# Patient Record
Sex: MALE
[De-identification: names, ages, dates, MRNs, and addresses within clinical notes are randomized per-mention and may not be internally consistent; named-entity substitution may affect disease eponyms.]

---

## 2024-02-26 PROBLEM — Z00.00 ENCOUNTER FOR PREVENTIVE HEALTH EXAMINATION: Status: ACTIVE | Noted: 2024-02-26

## 2024-03-19 ENCOUNTER — APPOINTMENT (OUTPATIENT)
Dept: ORTHOPEDIC SURGERY | Facility: CLINIC | Age: 69
End: 2024-03-19

## 2024-03-26 ENCOUNTER — APPOINTMENT (OUTPATIENT)
Dept: ORTHOPEDIC SURGERY | Facility: CLINIC | Age: 69
End: 2024-03-26

## 2024-03-29 ENCOUNTER — APPOINTMENT (OUTPATIENT)
Dept: ORTHOPEDIC SURGERY | Facility: CLINIC | Age: 69
End: 2024-03-29

## 2024-07-30 ENCOUNTER — APPOINTMENT (OUTPATIENT)
Dept: ORTHOPEDIC SURGERY | Facility: CLINIC | Age: 69
End: 2024-07-30

## 2024-08-27 ENCOUNTER — APPOINTMENT (OUTPATIENT)
Dept: ORTHOPEDIC SURGERY | Facility: CLINIC | Age: 69
End: 2024-08-27
Payer: MEDICARE

## 2024-08-27 VITALS — HEIGHT: 69 IN | WEIGHT: 180 LBS | BODY MASS INDEX: 26.66 KG/M2

## 2024-08-27 DIAGNOSIS — E78.00 PURE HYPERCHOLESTEROLEMIA, UNSPECIFIED: ICD-10-CM

## 2024-08-27 DIAGNOSIS — Z96.652 PRESENCE OF LEFT ARTIFICIAL KNEE JOINT: ICD-10-CM

## 2024-08-27 DIAGNOSIS — Z00.00 ENCOUNTER FOR GENERAL ADULT MEDICAL EXAMINATION W/OUT ABNORMAL FINDINGS: ICD-10-CM

## 2024-08-27 DIAGNOSIS — I10 ESSENTIAL (PRIMARY) HYPERTENSION: ICD-10-CM

## 2024-08-27 PROCEDURE — 99203 OFFICE O/P NEW LOW 30 MIN: CPT

## 2024-08-27 PROCEDURE — 73564 X-RAY EXAM KNEE 4 OR MORE: CPT | Mod: LT

## 2024-08-27 NOTE — IMAGING
[de-identified] : LEFT KNEE EXAM Well healed surgical scar Alignment: Neutral  Effusion: None Atrophy: None                                                  Stable to Varus/valgus stress Posterior Drawer Test: negative Anterior Drawer Test: Negative Knee Extension/Flexion:10- 120  Medial/lateral compartments Medial joint line: No Tenderness Lateral joint line: No Tenderness Ashu test: negative  Patellofemoral joint Medial patellar facet: no tenderness Patellar grind: Negative  Tendons: Pes Anserine: No tenderness Gerdys Tubercle/ IT Band: No tenderness Quadriceps Tendon: No Tenderness patellar tendon: no Tenderness Tibial tubercle: not tenderness Calf: no Tenderness  Neurovascular exam Muscle strength: 5/5 Sensation to light touch: intact Distal pulses: 2+  [id number] Xrays of the Left Knee were taken demonstrating non resurfaced patella, implant in place without signs of loosening

## 2024-08-27 NOTE — ASSESSMENT
[FreeTextEntry1] : 70 y/o M s/p L TKA with flexion contracture and unresurfaced patella  Physical Therapy ESR/CRP/-[phone]-Sister Jazz call with results

## 2024-08-27 NOTE — HISTORY OF PRESENT ILLNESS
[] : yes [de-identified] : Patient states he had knee surgery about 5 years ago, pain has been constant since surgery and has worsened over time. Patient states he has issues walking. Had TKA at [facility], states he never improved. Had therapy completed. Has trouble putting weight on the leg and walking.

## 2024-10-04 ENCOUNTER — INPATIENT (INPATIENT)
Facility: HOSPITAL | Age: 69
LOS: 19 days | Discharge: HOME HEALTH | DRG: 176 | End: 2024-10-24
Attending: STUDENT IN AN ORGANIZED HEALTH CARE EDUCATION/TRAINING PROGRAM | Admitting: STUDENT IN AN ORGANIZED HEALTH CARE EDUCATION/TRAINING PROGRAM
Payer: MEDICARE

## 2024-10-04 VITALS
WEIGHT: 149.25 LBS | DIASTOLIC BLOOD PRESSURE: 78 MMHG | HEART RATE: 94 BPM | TEMPERATURE: 99 F | RESPIRATION RATE: 18 BRPM | SYSTOLIC BLOOD PRESSURE: 109 MMHG | OXYGEN SATURATION: 92 %

## 2024-10-04 DIAGNOSIS — I26.99 OTHER PULMONARY EMBOLISM WITHOUT ACUTE COR PULMONALE: ICD-10-CM

## 2024-10-04 LAB
ALBUMIN SERPL ELPH-MCNC: 3.6 G/DL — SIGNIFICANT CHANGE UP (ref 3.3–5)
ALP SERPL-CCNC: 118 U/L — SIGNIFICANT CHANGE UP (ref 40–120)
ALT FLD-CCNC: 7 U/L — LOW (ref 10–45)
ANION GAP SERPL CALC-SCNC: 13 MMOL/L — SIGNIFICANT CHANGE UP (ref 5–17)
AST SERPL-CCNC: 19 U/L — SIGNIFICANT CHANGE UP (ref 10–40)
BILIRUB SERPL-MCNC: 0.5 MG/DL — SIGNIFICANT CHANGE UP (ref 0.2–1.2)
BUN SERPL-MCNC: 14 MG/DL — SIGNIFICANT CHANGE UP (ref 7–23)
CALCIUM SERPL-MCNC: 9 MG/DL — SIGNIFICANT CHANGE UP (ref 8.4–10.5)
CHLORIDE SERPL-SCNC: 101 MMOL/L — SIGNIFICANT CHANGE UP (ref 96–108)
CO2 SERPL-SCNC: 25 MMOL/L — SIGNIFICANT CHANGE UP (ref 22–31)
CREAT SERPL-MCNC: 0.94 MG/DL — SIGNIFICANT CHANGE UP (ref 0.5–1.3)
EGFR: 89 ML/MIN/1.73M2 — SIGNIFICANT CHANGE UP
GAS PNL BLDV: SIGNIFICANT CHANGE UP
GLUCOSE SERPL-MCNC: 116 MG/DL — HIGH (ref 70–99)
HCT VFR BLD CALC: 31.4 % — LOW (ref 39–50)
HGB BLD-MCNC: 10.1 G/DL — LOW (ref 13–17)
MAGNESIUM SERPL-MCNC: 2.1 MG/DL — SIGNIFICANT CHANGE UP (ref 1.6–2.6)
MCHC RBC-ENTMCNC: 30.3 PG — SIGNIFICANT CHANGE UP (ref 27–34)
MCHC RBC-ENTMCNC: 32.2 GM/DL — SIGNIFICANT CHANGE UP (ref 32–36)
MCV RBC AUTO: 94.3 FL — SIGNIFICANT CHANGE UP (ref 80–100)
NRBC # BLD: 0 /100 WBCS — SIGNIFICANT CHANGE UP (ref 0–0)
PHOSPHATE SERPL-MCNC: 4 MG/DL — SIGNIFICANT CHANGE UP (ref 2.5–4.5)
PLATELET # BLD AUTO: 291 K/UL — SIGNIFICANT CHANGE UP (ref 150–400)
POTASSIUM SERPL-MCNC: 4.1 MMOL/L — SIGNIFICANT CHANGE UP (ref 3.5–5.3)
POTASSIUM SERPL-SCNC: 4.1 MMOL/L — SIGNIFICANT CHANGE UP (ref 3.5–5.3)
PROT SERPL-MCNC: 7.3 G/DL — SIGNIFICANT CHANGE UP (ref 6–8.3)
RBC # BLD: 3.33 M/UL — LOW (ref 4.2–5.8)
RBC # FLD: 13.6 % — SIGNIFICANT CHANGE UP (ref 10.3–14.5)
SODIUM SERPL-SCNC: 139 MMOL/L — SIGNIFICANT CHANGE UP (ref 135–145)
WBC # BLD: 15.17 K/UL — HIGH (ref 3.8–10.5)
WBC # FLD AUTO: 15.17 K/UL — HIGH (ref 3.8–10.5)

## 2024-10-04 PROCEDURE — 93010 ELECTROCARDIOGRAM REPORT: CPT

## 2024-10-04 NOTE — CHART NOTE - NSCHARTNOTEFT_GEN_A_CORE
MAR Accept Note    Transfer from:   Transfer to:   Accepting Attending Physician:    Assigned Room: 6TOW    Patient seen and examined.   Labs and data reviewed.   No findings precluding transfer of service.       HPI/MICU COURSE:   Please refer to MICU transfer note for full details. Briefly, this is a      FOR FOLLOW-UP:    Rony Jaramillo, PGY3 MAR Accept Note    Transfer from: [facility]  Transfer to: [facility]  Accepting Attending Physician: Avril Govea  Assigned Room: 6TOW 611D    Patient seen and examined.   Labs and data reviewed.   No findings precluding transfer of service.       Briefly, 69 year old M with CVA, CAD, HTN, HLD, presented initially to [facility] on 9/14 after a fall. Prolonged and extensive hospital course including suspected aspiration events with resulting acute hypoxic/hypercapnic resp failure requiring intubation and MICU stay. Patient downgraded to medicine at some point thereafter, but on 10/4 was newly hypoxic and tachypneic, and was found to have large R main pulmonary artery PE. Patient transferred to [facility] for consideration of advanced therapies and further management of PE. Patient received on heparin gtt, on 4L NC, hemodynamically stable, AOx1 with audible secretions, NGT in place, R arm immobilized due to recent scapula fracture.       To Do:  [ ] will f/u PTT to redose and resume heparin gtt STAT  [ ] f/u trop/BNP and TTE to risk stratify PE  [ ] f/u LE dopplers  [ ] pulmonary toilet given copious secretions on exam  [ ] patient will need vascular cardiology consult in AM for consideration of advanced therapies. Given location of clot, low threshold to activate PERT team overnight if patient begins to decompensate  [ ] patient's chart contains CDs containing images, will need to be uploaded into PACS by radiology in AM      Full admission to be completed by admitting resident.      Rony Jaramillo, PGY3 MAR Accept Note    Transfer from: [facility]  Transfer to: [facility]  Accepting Attending Physician: Avril Govea  Assigned Room: 6TOW 611D    Patient seen and examined.   Labs and data reviewed.   No findings precluding transfer of service.       Briefly, 69 year old M with CVA, CAD, HTN, HLD, presented initially to [facility] on 9/14 after a fall. Prolonged and extensive hospital course including suspected aspiration events with resulting acute hypoxic/hypercapnic resp failure requiring intubation and MICU stay. Patient downgraded to medicine at some point thereafter, but on 10/4 was newly hypoxic and tachypneic, and was found to have large R main pulmonary artery PE. Patient transferred to [facility] for consideration of advanced therapies and further management of PE. Patient received on heparin gtt, on 4L NC, hemodynamically stable, AOx1 with audible secretions, NGT in place, R arm immobilized due to recent scapula fracture.       To Do:  [ ] will f/u PTT to redose and resume heparin gtt STAT  [ ] f/u trop/BNP and TTE to risk stratify PE  [ ] f/u LE dopplers  [ ] pulmonary toilet given copious secretions on exam  [ ] patient will need vascular cardiology consult in AM for consideration of advanced therapies. Given location of clot, low threshold to activate PERT team overnight if patient begins to decompensate  [ ] patient's chart contains CDs containing images, will need to be uploaded into PACS by radiology in AM      Addendum: ECG with evidence of R heart strain, BNP elevated to 7k, trop mildly elevated to 92, consistent with intermediate-high risk PE.       Full admission to be completed by admitting resident.      Rony Jaramillo, PGY3 MAR Accept Note    Transfer from: [facility]  Transfer to: [facility]  Accepting Attending Physician: Avril Govea  Assigned Room: 6TOW 611D    Patient seen and examined.   Labs and data reviewed.   No findings precluding transfer of service.       Briefly, 69 year old M with CVA, CAD, HTN, HLD, presented initially to [facility] on 9/14 after a fall. Prolonged and extensive hospital course including suspected aspiration events with resulting acute hypoxic/hypercapnic resp failure requiring intubation and MICU stay. Patient downgraded to medicine at some point thereafter, but on 10/4 was newly hypoxic and tachypneic, and was found to have large R main pulmonary artery PE. Patient transferred to [facility] for consideration of advanced therapies and further management of PE. Patient received on heparin gtt, on 4L NC, hemodynamically stable, AOx1 with audible secretions, NGT in place, R arm immobilized due to recent scapula fracture.       To Do:  [ ] will f/u PTT to redose and resume heparin gtt STAT  [ ] f/u trop/BNP and TTE to risk stratify PE  [ ] f/u LE dopplers  [ ] pulmonary toilet given copious secretions on exam  [ ] patient will need vascular cardiology consult in AM for consideration of advanced therapies. Given location of clot, low threshold to activate PERT team overnight if patient begins to decompensate  [ ] patient's chart contains CDs containing images, will need to be uploaded into PACS by radiology in AM      Full admission to be completed by admitting resident.      Rony Jaramillo, PGY3 MAR Accept Note    Transfer from: [facility]  Transfer to: [facility]  Accepting Attending Physician: Avril Govea  Assigned Room: 6TOW 611D    Patient seen and examined.   Labs and data reviewed.   No findings precluding transfer of service.       Briefly, 69 year old M with CVA, CAD, HTN, HLD, aflutter presented initially to [facility] on 9/14 after a fall. Prolonged and extensive hospital course including suspected aspiration events with resulting acute hypoxic/hypercapnic resp failure requiring intubation and MICU stay. Patient downgraded to medicine at some point thereafter, but on 10/4 was newly hypoxic and tachypneic, and was found to have large R main pulmonary artery PE. Patient transferred to [facility] for consideration of advanced therapies and further management of PE. Patient received on heparin gtt, on 4L NC, hemodynamically stable, AOx1 with audible secretions, NGT in place, R arm immobilized due to recent scapula fracture.       To Do:  [ ] will f/u PTT to redose and resume heparin gtt STAT  [ ] f/u trop/BNP and TTE to risk stratify PE  [ ] f/u LE dopplers  [ ] pulmonary toilet given copious secretions on exam  [ ] patient will need vascular cardiology consult in AM for consideration of advanced therapies. Given location of clot, low threshold to activate PERT team overnight if patient begins to decompensate  [ ] patient's chart contains CDs containing images, will need to be uploaded into PACS by radiology in AM      Full admission to be completed by admitting resident.      Rony Jaramillo, PGY3 MAR Accept Note    Transfer from: [facility]  Transfer to: [facility]  Accepting Attending Physician: Avril Govea  Assigned Room: 6TOW 611D    Patient seen and examined.   Labs and data reviewed.   No findings precluding transfer of service.       Briefly, 69 year old M with CVA, CAD, HTN, HLD, aflutter presented initially to [facility] on 9/14 after a fall. Prolonged and extensive hospital course including suspected aspiration events with resulting acute hypoxic/hypercapnic resp failure requiring intubation and MICU stay. Patient downgraded to medicine at some point thereafter, but on 10/4 was newly hypoxic and tachypneic, and was found to have large R main pulmonary artery PE. Patient transferred to [facility] for consideration of advanced therapies and further management of PE. Patient received on heparin gtt, on 4L NC, hemodynamically stable, AOx1 with audible secretions, NGT in place, R arm immobilized due to recent scapula fracture.       To Do:  [ ] will f/u PTT to redose and resume heparin gtt STAT  [ ] f/u trop/BNP and TTE to risk stratify PE  [ ] f/u LE dopplers  [ ] pulmonary toilet given copious secretions on exam  [ ] patient will need vascular cardiology consult in AM for consideration of advanced therapies. Given location of clot, low threshold to activate PERT team overnight if patient begins to decompensate  [ ] patient's chart contains CDs containing images, will need to be uploaded into PACS by radiology in AM      Addendum: ECG with evidence of R heart strain (last ECG read available in paper chart, no actual ECG, but mentions only left anterior fascicular block and poss LVH), mildly elevated hsTrop-T 92 (at OSH, US trop-I 10x upper limit of normal 0.45 10/4 AM) , BNP 7000. Overall suggestive of intermediate high risk PE. Discussed with cardiology fellow who is already aware of patient.    Full admission to be completed by admitting resident.      Rony Jaramillo, PGY3 MAR Accept Note    Transfer from: [facility]  Transfer to: [facility]  Accepting Attending Physician: Avril Govea  Assigned Room: 6TOW 611D    Patient seen and examined.   Labs and data reviewed.   No findings precluding transfer of service.       Briefly, 69 year old M with CVA, CAD, HTN, HLD, aflutter presented initially to [facility] on 9/14 after a fall. Prolonged and extensive hospital course including suspected aspiration events with resulting acute hypoxic/hypercapnic resp failure requiring intubation and MICU stay. Patient downgraded to medicine at some point thereafter, but on 10/4 was newly hypoxic and tachypneic, and was found to have large R main pulmonary artery PE. Patient transferred to [facility] for consideration of advanced therapies and further management of PE. Patient received on heparin gtt, on 4L NC, hemodynamically stable, AOx1 with audible secretions, NGT in place, R arm immobilized due to recent scapula fracture.       To Do:  [ ] will f/u PTT to redose and resume heparin gtt STAT  [ ] f/u trop/BNP and TTE to risk stratify PE  [ ] f/u LE dopplers  [ ] pulmonary toilet given copious secretions on exam  [ ] patient will need vascular cardiology consult in AM for consideration of advanced therapies. Given location of clot, low threshold to activate PERT team overnight if patient begins to decompensate  [ ] patient's chart contains CDs containing images, will need to be uploaded into PACS by radiology in AM      Addendum: ECG with evidence of R heart strain (last ECG read available in paper chart, no actual ECG, but mentions only left anterior fascicular block and poss LVH), mildly elevated hsTrop-T 92 (at OSH, US trop-I 10x upper limit of normal 0.45 10/4 AM) , BNP 7000. Overall suggestive of intermediate high risk PE. Discussed with cardiology fellow who is already aware of patient. Patient's chart mentions stable acute on chronic subdural hematoma (as of 9/25), will resume heparin gtt with patient specific narrow parameters at goal of 60-80 and consider repeat CTH when PTT is within therapeutic range to ensure stability.    Full admission to be completed by admitting resident.      Rony Jaramillo, PGY3

## 2024-10-04 NOTE — PATIENT PROFILE ADULT - FALL HARM RISK - HARM RISK INTERVENTIONS
Assistance with ambulation/Assistance OOB with selected safe patient handling equipment/Communicate Risk of Fall with Harm to all staff/Discuss with provider need for PT consult/Monitor for mental status changes/Monitor gait and stability/Provide patient with walking aids - walker, cane, crutches/Reinforce activity limits and safety measures with patient and family/Reorient to person, place and time as needed/Review medications for side effects contributing to fall risk/Sit up slowly, dangle for a short time, stand at bedside before walking/Tailored Fall Risk Interventions/Use of alarms - bed, chair and/or voice tab/Visual Cue: Yellow wristband and red socks/Bed in lowest position, wheels locked, appropriate side rails in place/Call bell, personal items and telephone in reach/Instruct patient to call for assistance before getting out of bed or chair/Non-slip footwear when patient is out of bed/[place] to call system/Physically safe environment - no spills, clutter or unnecessary equipment/Purposeful Proactive Rounding/Room/bathroom lighting operational, light cord in reach

## 2024-10-05 ENCOUNTER — RESULT REVIEW (OUTPATIENT)
Age: 69
End: 2024-10-05

## 2024-10-05 DIAGNOSIS — J96.01 ACUTE RESPIRATORY FAILURE WITH HYPOXIA: ICD-10-CM

## 2024-10-05 DIAGNOSIS — I10 ESSENTIAL (PRIMARY) HYPERTENSION: ICD-10-CM

## 2024-10-05 DIAGNOSIS — Z98.890 OTHER SPECIFIED POSTPROCEDURAL STATES: Chronic | ICD-10-CM

## 2024-10-05 DIAGNOSIS — Z29.9 ENCOUNTER FOR PROPHYLACTIC MEASURES, UNSPECIFIED: ICD-10-CM

## 2024-10-05 DIAGNOSIS — I48.92 UNSPECIFIED ATRIAL FLUTTER: ICD-10-CM

## 2024-10-05 DIAGNOSIS — I26.99 OTHER PULMONARY EMBOLISM WITHOUT ACUTE COR PULMONALE: ICD-10-CM

## 2024-10-05 DIAGNOSIS — R13.10 DYSPHAGIA, UNSPECIFIED: ICD-10-CM

## 2024-10-05 DIAGNOSIS — I82.409 ACUTE EMBOLISM AND THROMBOSIS OF UNSPECIFIED DEEP VEINS OF UNSPECIFIED LOWER EXTREMITY: ICD-10-CM

## 2024-10-05 DIAGNOSIS — S42.309A UNSPECIFIED FRACTURE OF SHAFT OF HUMERUS, UNSPECIFIED ARM, INITIAL ENCOUNTER FOR CLOSED FRACTURE: ICD-10-CM

## 2024-10-05 DIAGNOSIS — E11.9 TYPE 2 DIABETES MELLITUS WITHOUT COMPLICATIONS: ICD-10-CM

## 2024-10-05 LAB
A1C WITH ESTIMATED AVERAGE GLUCOSE RESULT: 6.1 % — HIGH (ref 4–5.6)
ALBUMIN SERPL ELPH-MCNC: 3.7 G/DL — SIGNIFICANT CHANGE UP (ref 3.3–5)
ALP SERPL-CCNC: 119 U/L — SIGNIFICANT CHANGE UP (ref 40–120)
ALT FLD-CCNC: 7 U/L — LOW (ref 10–45)
ANION GAP SERPL CALC-SCNC: 15 MMOL/L — SIGNIFICANT CHANGE UP (ref 5–17)
APTT BLD: 34.8 SEC — SIGNIFICANT CHANGE UP (ref 24.5–35.6)
APTT BLD: 45.5 SEC — HIGH (ref 24.5–35.6)
APTT BLD: 57.3 SEC — HIGH (ref 24.5–35.6)
APTT BLD: 68.7 SEC — HIGH (ref 24.5–35.6)
AST SERPL-CCNC: 17 U/L — SIGNIFICANT CHANGE UP (ref 10–40)
BILIRUB SERPL-MCNC: 0.5 MG/DL — SIGNIFICANT CHANGE UP (ref 0.2–1.2)
BUN SERPL-MCNC: 14 MG/DL — SIGNIFICANT CHANGE UP (ref 7–23)
CALCIUM SERPL-MCNC: 9.2 MG/DL — SIGNIFICANT CHANGE UP (ref 8.4–10.5)
CHLORIDE SERPL-SCNC: 102 MMOL/L — SIGNIFICANT CHANGE UP (ref 96–108)
CO2 SERPL-SCNC: 26 MMOL/L — SIGNIFICANT CHANGE UP (ref 22–31)
CREAT SERPL-MCNC: 0.93 MG/DL — SIGNIFICANT CHANGE UP (ref 0.5–1.3)
EGFR: 90 ML/MIN/1.73M2 — SIGNIFICANT CHANGE UP
ESTIMATED AVERAGE GLUCOSE: 128 MG/DL — HIGH (ref 68–114)
GAS PNL BLDV: SIGNIFICANT CHANGE UP
GLUCOSE BLDC GLUCOMTR-MCNC: 144 MG/DL — HIGH (ref 70–99)
GLUCOSE BLDC GLUCOMTR-MCNC: 153 MG/DL — HIGH (ref 70–99)
GLUCOSE BLDC GLUCOMTR-MCNC: 166 MG/DL — HIGH (ref 70–99)
GLUCOSE BLDC GLUCOMTR-MCNC: 183 MG/DL — HIGH (ref 70–99)
GLUCOSE SERPL-MCNC: 142 MG/DL — HIGH (ref 70–99)
HCT VFR BLD CALC: 33.3 % — LOW (ref 39–50)
HGB BLD-MCNC: 10.7 G/DL — LOW (ref 13–17)
INR BLD: 1.2 RATIO — HIGH (ref 0.85–1.16)
MAGNESIUM SERPL-MCNC: 2.2 MG/DL — SIGNIFICANT CHANGE UP (ref 1.6–2.6)
MCHC RBC-ENTMCNC: 30.6 PG — SIGNIFICANT CHANGE UP (ref 27–34)
MCHC RBC-ENTMCNC: 32.1 GM/DL — SIGNIFICANT CHANGE UP (ref 32–36)
MCV RBC AUTO: 95.1 FL — SIGNIFICANT CHANGE UP (ref 80–100)
NRBC # BLD: 0 /100 WBCS — SIGNIFICANT CHANGE UP (ref 0–0)
NT-PROBNP SERPL-SCNC: 4675 PG/ML — HIGH (ref 0–300)
NT-PROBNP SERPL-SCNC: 4874 PG/ML — HIGH (ref 0–300)
NT-PROBNP SERPL-SCNC: 6058 PG/ML — HIGH (ref 0–300)
NT-PROBNP SERPL-SCNC: 7168 PG/ML — HIGH (ref 0–300)
PHOSPHATE SERPL-MCNC: 4.3 MG/DL — SIGNIFICANT CHANGE UP (ref 2.5–4.5)
PLATELET # BLD AUTO: 283 K/UL — SIGNIFICANT CHANGE UP (ref 150–400)
POTASSIUM SERPL-MCNC: 3.9 MMOL/L — SIGNIFICANT CHANGE UP (ref 3.5–5.3)
POTASSIUM SERPL-SCNC: 3.9 MMOL/L — SIGNIFICANT CHANGE UP (ref 3.5–5.3)
PROT SERPL-MCNC: 7.5 G/DL — SIGNIFICANT CHANGE UP (ref 6–8.3)
PROTHROM AB SERPL-ACNC: 13.6 SEC — HIGH (ref 9.9–13.4)
RBC # BLD: 3.5 M/UL — LOW (ref 4.2–5.8)
RBC # FLD: 13.7 % — SIGNIFICANT CHANGE UP (ref 10.3–14.5)
SODIUM SERPL-SCNC: 143 MMOL/L — SIGNIFICANT CHANGE UP (ref 135–145)
TROPONIN T, HIGH SENSITIVITY RESULT: 70 NG/L — HIGH (ref 0–51)
TROPONIN T, HIGH SENSITIVITY RESULT: 72 NG/L — HIGH (ref 0–51)
TROPONIN T, HIGH SENSITIVITY RESULT: 84 NG/L — HIGH (ref 0–51)
TROPONIN T, HIGH SENSITIVITY RESULT: 92 NG/L — HIGH (ref 0–51)
UFH PPP CHRO-ACNC: 0.44 IU/ML — SIGNIFICANT CHANGE UP (ref 0.3–0.7)
UFH PPP CHRO-ACNC: >2 IU/ML (ref 0.3–0.7)
WBC # BLD: 13.87 K/UL — HIGH (ref 3.8–10.5)
WBC # FLD AUTO: 13.87 K/UL — HIGH (ref 3.8–10.5)

## 2024-10-05 PROCEDURE — 93010 ELECTROCARDIOGRAM REPORT: CPT

## 2024-10-05 PROCEDURE — 93356 MYOCRD STRAIN IMG SPCKL TRCK: CPT

## 2024-10-05 PROCEDURE — 93306 TTE W/DOPPLER COMPLETE: CPT | Mod: 26

## 2024-10-05 PROCEDURE — 71045 X-RAY EXAM CHEST 1 VIEW: CPT | Mod: 26

## 2024-10-05 PROCEDURE — 99418 PROLNG IP/OBS E/M EA 15 MIN: CPT

## 2024-10-05 PROCEDURE — 99223 1ST HOSP IP/OBS HIGH 75: CPT

## 2024-10-05 PROCEDURE — 99223 1ST HOSP IP/OBS HIGH 75: CPT | Mod: GC

## 2024-10-05 RX ORDER — HEPARIN SODIUM 10000 [USP'U]/ML
15 INJECTION INTRAVENOUS; SUBCUTANEOUS
Qty: 25000 | Refills: 0 | Status: DISCONTINUED | OUTPATIENT
Start: 2024-10-05 | End: 2024-10-05

## 2024-10-05 RX ORDER — CYANOCOBALAMIN (VITAMIN B-12) 1000MCG/15
1 LIQUID (ML) ORAL
Refills: 0 | DISCHARGE

## 2024-10-05 RX ORDER — PIPERACILLIN AND TAZOBACTAM .5; 4 G/20ML; G/20ML
3.38 INJECTION, POWDER, LYOPHILIZED, FOR SOLUTION INTRAVENOUS EVERY 8 HOURS
Refills: 0 | Status: COMPLETED | OUTPATIENT
Start: 2024-10-05 | End: 2024-10-11

## 2024-10-05 RX ORDER — AMIODARONE HCL 200 MG
200 TABLET ORAL DAILY
Refills: 0 | Status: DISCONTINUED | OUTPATIENT
Start: 2024-10-05 | End: 2024-10-24

## 2024-10-05 RX ORDER — ACETAMINOPHEN 500 MG
2 TABLET ORAL
Refills: 0 | DISCHARGE

## 2024-10-05 RX ORDER — THIAMINE HCL 100 MG
100 TABLET ORAL DAILY
Refills: 0 | Status: DISCONTINUED | OUTPATIENT
Start: 2024-10-05 | End: 2024-10-24

## 2024-10-05 RX ORDER — INSULIN LISPRO 100/ML
VIAL (ML) SUBCUTANEOUS AT BEDTIME
Refills: 0 | Status: DISCONTINUED | OUTPATIENT
Start: 2024-10-05 | End: 2024-10-21

## 2024-10-05 RX ORDER — INSULIN LISPRO 100/ML
VIAL (ML) SUBCUTANEOUS
Refills: 0 | Status: DISCONTINUED | OUTPATIENT
Start: 2024-10-05 | End: 2024-10-11

## 2024-10-05 RX ORDER — BUDESONIDE AND FORMOTEROL FUMARATE DIHYDRATE 80; 4.5 UG/1; UG/1
2 AEROSOL RESPIRATORY (INHALATION)
Refills: 0 | DISCHARGE

## 2024-10-05 RX ORDER — POLYETHYLENE GLYCOL 3350 17 G/17G
17 POWDER, FOR SOLUTION ORAL DAILY
Refills: 0 | Status: DISCONTINUED | OUTPATIENT
Start: 2024-10-05 | End: 2024-10-24

## 2024-10-05 RX ORDER — HEPARIN SODIUM 10000 [USP'U]/ML
1500 INJECTION INTRAVENOUS; SUBCUTANEOUS
Qty: 25000 | Refills: 0 | Status: DISCONTINUED | OUTPATIENT
Start: 2024-10-05 | End: 2024-10-08

## 2024-10-05 RX ORDER — GLUCAGON INJECTION, SOLUTION 1 MG/.2ML
1 INJECTION, SOLUTION SUBCUTANEOUS ONCE
Refills: 0 | Status: DISCONTINUED | OUTPATIENT
Start: 2024-10-05 | End: 2024-10-24

## 2024-10-05 RX ORDER — AMIODARONE HCL 200 MG
1 TABLET ORAL
Refills: 0 | DISCHARGE

## 2024-10-05 RX ORDER — CYANOCOBALAMIN (VITAMIN B-12) 1000MCG/15
250 LIQUID (ML) ORAL DAILY
Refills: 0 | Status: DISCONTINUED | OUTPATIENT
Start: 2024-10-05 | End: 2024-10-05

## 2024-10-05 RX ORDER — AMLODIPINE BESYLATE 10 MG
10 TABLET ORAL DAILY
Refills: 0 | Status: DISCONTINUED | OUTPATIENT
Start: 2024-10-05 | End: 2024-10-24

## 2024-10-05 RX ORDER — PANTOPRAZOLE SODIUM 40 MG/1
40 TABLET, DELAYED RELEASE ORAL DAILY
Refills: 0 | Status: COMPLETED | OUTPATIENT
Start: 2024-10-05 | End: 2024-10-18

## 2024-10-05 RX ORDER — ESOMEPRAZOLE MAGNESIUM 40 MG/1
1 CAPSULE, DELAYED RELEASE ORAL
Refills: 0 | DISCHARGE

## 2024-10-05 RX ORDER — PANTOPRAZOLE SODIUM 40 MG/1
40 TABLET, DELAYED RELEASE ORAL
Refills: 0 | Status: DISCONTINUED | OUTPATIENT
Start: 2024-10-05 | End: 2024-10-05

## 2024-10-05 RX ORDER — ERGOCALCIFEROL (VITAMIN D2) 200 MCG/ML
1 DROPS ORAL
Refills: 0 | DISCHARGE

## 2024-10-05 RX ORDER — POLYETHYLENE GLYCOL 3350 17 G/17G
17 POWDER, FOR SOLUTION ORAL
Refills: 0 | DISCHARGE

## 2024-10-05 RX ORDER — METOPROLOL TARTRATE 50 MG
1 TABLET ORAL
Refills: 0 | DISCHARGE

## 2024-10-05 RX ORDER — FLUTICASONE PROPIONATE AND SALMETEROL XINAFOATE 230; 21 UG/1; UG/1
1 AEROSOL, METERED RESPIRATORY (INHALATION)
Refills: 0 | Status: DISCONTINUED | OUTPATIENT
Start: 2024-10-05 | End: 2024-10-24

## 2024-10-05 RX ORDER — IPRATROPIUM BROMIDE AND ALBUTEROL SULFATE .5; 2.5 MG/3ML; MG/3ML
3 SOLUTION RESPIRATORY (INHALATION) EVERY 6 HOURS
Refills: 0 | Status: DISCONTINUED | OUTPATIENT
Start: 2024-10-05 | End: 2024-10-24

## 2024-10-05 RX ORDER — METOPROLOL TARTRATE 50 MG
25 TABLET ORAL
Refills: 0 | Status: DISCONTINUED | OUTPATIENT
Start: 2024-10-05 | End: 2024-10-06

## 2024-10-05 RX ORDER — INSULIN LISPRO 100/ML
VIAL (ML) SUBCUTANEOUS EVERY 6 HOURS
Refills: 0 | Status: DISCONTINUED | OUTPATIENT
Start: 2024-10-05 | End: 2024-10-05

## 2024-10-05 RX ORDER — GLUCAGON INJECTION, SOLUTION 1 MG/.2ML
1 INJECTION, SOLUTION SUBCUTANEOUS ONCE
Refills: 0 | Status: DISCONTINUED | OUTPATIENT
Start: 2024-10-05 | End: 2024-10-05

## 2024-10-05 RX ORDER — HEPARIN SODIUM 10000 [USP'U]/ML
1500 INJECTION INTRAVENOUS; SUBCUTANEOUS
Qty: 25000 | Refills: 0 | Status: DISCONTINUED | OUTPATIENT
Start: 2024-10-05 | End: 2024-10-05

## 2024-10-05 RX ORDER — THIAMINE HCL 100 MG
1 TABLET ORAL
Refills: 0 | DISCHARGE

## 2024-10-05 RX ADMIN — Medication 10 MILLIGRAM(S): at 05:59

## 2024-10-05 RX ADMIN — HEPARIN SODIUM 15 UNIT(S)/HR: 10000 INJECTION INTRAVENOUS; SUBCUTANEOUS at 07:54

## 2024-10-05 RX ADMIN — IPRATROPIUM BROMIDE AND ALBUTEROL SULFATE 3 MILLILITER(S): .5; 2.5 SOLUTION RESPIRATORY (INHALATION) at 12:51

## 2024-10-05 RX ADMIN — HEPARIN SODIUM 16 UNIT(S)/HR: 10000 INJECTION INTRAVENOUS; SUBCUTANEOUS at 09:50

## 2024-10-05 RX ADMIN — PANTOPRAZOLE SODIUM 40 MILLIGRAM(S): 40 TABLET, DELAYED RELEASE ORAL at 12:51

## 2024-10-05 RX ADMIN — PIPERACILLIN AND TAZOBACTAM 25 GRAM(S): .5; 4 INJECTION, POWDER, LYOPHILIZED, FOR SOLUTION INTRAVENOUS at 21:58

## 2024-10-05 RX ADMIN — HEPARIN SODIUM 15 UNIT(S)/HR: 10000 INJECTION INTRAVENOUS; SUBCUTANEOUS at 03:10

## 2024-10-05 RX ADMIN — Medication 200 MILLIGRAM(S): at 05:59

## 2024-10-05 RX ADMIN — Medication 1: at 12:52

## 2024-10-05 RX ADMIN — Medication 100 MILLIGRAM(S): at 12:51

## 2024-10-05 RX ADMIN — Medication 25 MILLIGRAM(S): at 05:59

## 2024-10-05 RX ADMIN — Medication 25 MILLIGRAM(S): at 18:05

## 2024-10-05 RX ADMIN — PIPERACILLIN AND TAZOBACTAM 25 GRAM(S): .5; 4 INJECTION, POWDER, LYOPHILIZED, FOR SOLUTION INTRAVENOUS at 05:59

## 2024-10-05 RX ADMIN — PIPERACILLIN AND TAZOBACTAM 25 GRAM(S): .5; 4 INJECTION, POWDER, LYOPHILIZED, FOR SOLUTION INTRAVENOUS at 15:14

## 2024-10-05 NOTE — DIETITIAN INITIAL EVALUATION ADULT - PROBLEM SELECTOR PLAN 2
- Hospital course at [facility] c/b AHRF  - S/p intubation on 9/18 for likely benzodiazepine overdose, extubated on 9/19  - Diffuse rhonchi with copious secretions on exam  - CTA chest (10/4) -> Large PE of R main pulmonary artery, scattered GGOs and interstitial changes predominantly in R and L lower lobes  - Likely PE +/- superimposed aspiration PNA  > C/w management of PE, as above  > C/w empiric Zosyn  > C/w oxygen supplementation prn

## 2024-10-05 NOTE — CONSULT NOTE ADULT - ATTENDING COMMENTS
Events that transpired leading to the patient's current hospitalization along with the patient's hospital course was gone over.  The patient's past medical history and surgical status family history social history was gone over.  Agree with 14 point review of systems as noted above.  Patient noted to have diffuse rhonchi bilaterally.  JVD is mildly elevated.    At the time of examination the patient is currently sitting at a 60 degree angle.  He is speaking in full sentences and appears comfortable.  He is on a heparin drip.  He denies any cardiopulmonary complaints at rest.  He does have pain in his right shoulder which he notes is unchanged in nature.    Discussed with the patient what is an intermediate high risk pulmonary embolism.  The associated signs and symptoms were reviewed.  The potential, acute, subacute and chronic complications were gone over.  The various treatment options were reviewed including medical therapy (anticoagulation versus thrombolytics), catheter-based procedures (mechanical thrombectomy versus catheter-based lytics) and surgery.  The process gone to the/benefits of the various treatment options were gone over.    The patient's current respiratory status appears to be multifactorial in nature.  Troponin/BNP trending downward.  Venous lactate within normal limits.  TTE was performed that demonstrated mildly enlarged right ventricular cavity size and reduced right ventricular systolic function.  A bioprosthetic aortic valve is present in the aortic position.  The prosthetic valve is normal function.  Left ventricular cavity is small.  Normal left ventricular systolic function with no regional wall motion abnormalities.  Normal pulmonary artery pressure.    Would recommend that patient be seen by neurology due to concern for subdural hematoma and need for anticoagulation therapy. ?  CT/MRI of the head.    If cleared by neurology continue heparin drip.  Closely monitor for signs and symptoms of bleeding.      ue to patient's clinical stability and downtrending troponin/BNP no plan for any catheter-based interventions for patient's pulmonary embolism was reviewed.  The patient's CTA of the chest from outside hospital was reviewed.    Recommend venous ultrasound of the upper and lower extremities.    Continue telemetry monitoring.    All questions and concerns of the patient were addressed.    Findings and plan were discussed with internal medicine resident/JACQUELIN Pedroza. Events that transpired leading to the patient's current hospitalization along with the patient's hospital course was gone over.  The patient's past medical history and surgical status family history social history was gone over.  Agree with 14 point review of systems as noted above.  Patient noted to have diffuse rhonchi bilaterally.  JVD is mildly elevated.    At the time of examination the patient is currently sitting at a 60 degree angle.  He is speaking in full sentences and appears comfortable.  He is on a heparin drip.  He denies any cardiopulmonary complaints at rest.  He does have pain in his right shoulder which he notes is unchanged in nature.    Discussed with the patient what is an intermediate high risk pulmonary embolism.  The associated signs and symptoms were reviewed.  The potential, acute, subacute and chronic complications were gone over.  The various treatment options were reviewed including medical therapy (anticoagulation versus thrombolytics), catheter-based procedures (mechanical thrombectomy versus catheter-based lytics) and surgery.  The process gone to the/benefits of the various treatment options were gone over.    The patient's current respiratory status appears to be multifactorial in nature.  Troponin/BNP trending downward.  Venous lactate within normal limits.  TTE was performed that demonstrated mildly enlarged right ventricular cavity size and reduced right ventricular systolic function.  A bioprosthetic aortic valve is present in the aortic position.  The prosthetic valve is normal function.  Left ventricular cavity is small.  Normal left ventricular systolic function with no regional wall motion abnormalities.  Normal pulmonary artery pressure.    Would recommend that patient be seen by neurology due to concern for subdural hematoma and need for anticoagulation therapy. ?  CT/MRI of the head.    If cleared by neurology continue heparin drip.  Closely monitor for signs and symptoms of bleeding.      ue to patient's clinical stability and downtrending troponin/BNP no plan for any catheter-based interventions for patient's pulmonary embolism was reviewed.  The patient's CTA of the chest from outside hospital was reviewed.    Recommend venous ultrasound of the upper and lower extremities.  US of b/l LEs at [facility] reported right peroneal vein and left popliteal, tibial trunk, peroneal, and posterior tibial DVTs    Continue telemetry monitoring.    All questions and concerns of the patient were addressed.    Findings and plan were discussed with internal medicine resident/JACQUELIN Pedroza.

## 2024-10-05 NOTE — H&P ADULT - PROBLEM SELECTOR PLAN 2
- Hospital course at [facility] c/b AHRF  - S/p intubation on 9/18 for likely benzodiazepine overdose, extubated on 9/19  - Diffuse rhonchi with copious secretions on exam  - CTA chest (10/4) -> Large PE of R main pulmonary artery, scattered GGOs and interstitial changes predominantly in R and L lower lobes  - Likely PE +/- superimposed aspiration PNA  > C/w management of PE, as above  > C/w empiric Zosyn  > C/w oxygen supplementation prn

## 2024-10-05 NOTE — PROGRESS NOTE ADULT - PROBLEM SELECTOR PLAN 1
- Hospital course at [facility] c/b AHRF  - CTA chest (10/4) -> Large PE of R main pulmonary artery  - US of b/l LEs -> R peroneal vein and L popliteal, tibial trunk, peroneal, and posterior tibial DVTs  - EKG showing evidence of right heart strain, S1Q3T3 changes, T wave depressions in precordial leads; not present on EKG from 9/15  - TTE (9/15) unremarkable  - Troponin 92, BNP 7168 on transfer  > C/w heparin gtt.  > Repeat TTE ordered  > Repeat CT head ordered to confirm stability of SDHs iso heparin gtt.  > Vascular cardiology consulted, will appreciate recs - Hospital course at [facility] c/b AHRF  - CTA chest (10/4) -> Large PE of R main pulmonary artery  - US of b/l LEs -> R peroneal vein and L popliteal, tibial trunk, peroneal, and posterior tibial DVTs  - EKG showing evidence of right heart strain, S1Q3T3 changes, T wave depressions in precordial leads; not present on EKG from 9/15  - TTE (9/15) unremarkable  - Troponin 92, BNP 7168 on transfer  > C/w heparin gtt.  > Repeat TTE ordered  > Repeat CT head ordered to confirm stability of SDHs iso heparin gtt.  > Vascular cardiology consulted rec heparin  - Neurosurg consulted for AC management, they want CT head

## 2024-10-05 NOTE — H&P ADULT - PROBLEM SELECTOR PLAN 7
> Start low-dose ISS > Start low-dose ISS following initiation of tube feeds > Start low-dose ISS q6h, adjust on initiation of tube feeds

## 2024-10-05 NOTE — DIETITIAN INITIAL EVALUATION ADULT - PERTINENT LABORATORY DATA
10-05    143  |  102  |  14  ----------------------------<  142[H]  3.9   |  26  |  0.93    Ca    9.2      05 Oct 2024 05:34  Phos  4.3     10-05  Mg     2.2     10-05    TPro  7.5  /  Alb  3.7  /  TBili  0.5  /  DBili  x   /  AST  17  /  ALT  7[L]  /  AlkPhos  119  10-05  POCT Blood Glucose.: 166 mg/dL (10-05-24 @ 11:06)

## 2024-10-05 NOTE — H&P ADULT - PROBLEM SELECTOR PLAN 8
DVT Ppx: Heparin gtt.  Diet: Tube feeds, can repeat S&S eval prn  Dispo: TBD DVT Ppx: Heparin gtt.  Diet: Nutrition consult for tube feeds, can repeat S&S eval prn  Dispo: TBD - Significant dysphagia s/p extubation on 9/19  - Patient declined PEG tube, NG tube placed instead  > Repeat S&S eval prn  > MRI brain ordered

## 2024-10-05 NOTE — DIETITIAN INITIAL EVALUATION ADULT - OTHER INFO
- T2DM (type 2 diabetes mellitus); no Recent HgbA1c able to assess   - Hx of alcohol use disorder, ordered for thiamine

## 2024-10-05 NOTE — H&P ADULT - PROBLEM SELECTOR PLAN 5
> C/w amiodarone 200mg qd, metoprolol tartrate 25mg BID > C/w amiodarone 200mg qd, metoprolol tartrate 25mg BID  > C/w telemetry

## 2024-10-05 NOTE — PROGRESS NOTE ADULT - SUBJECTIVE AND OBJECTIVE BOX
PROGRESS NOTE:   Authored by: Stephanie Pedroza MD  PGY-1    Patient is a 69y old  Male who presents with a chief complaint of Pulmonary Embolism (05 Oct 2024 01:58)      SUBJECTIVE / OVERNIGHT EVENTS:  No acute events overnight. Pt doing well this morning.    MEDICATIONS  (STANDING):  aMIOdarone    Tablet 200 milliGRAM(s) Oral daily  amLODIPine   Tablet 10 milliGRAM(s) Oral daily  heparin  Infusion 1500 Unit(s)/Hr (16 mL/Hr) IV Continuous <Continuous>  metoprolol tartrate 25 milliGRAM(s) Oral two times a day  pantoprazole  Injectable 40 milliGRAM(s) IV Push daily  piperacillin/tazobactam IVPB.. 3.375 Gram(s) IV Intermittent every 8 hours  polyethylene glycol 3350 17 Gram(s) Oral daily  thiamine 100 milliGRAM(s) Oral daily    MEDICATIONS  (PRN):      CAPILLARY BLOOD GLUCOSE        I&O's Summary      PHYSICAL EXAM:  Vital Signs Last 24 Hrs  T(C): 37 (05 Oct 2024 09:38), Max: 37.3 (04 Oct 2024 22:19)  T(F): 98.6 (05 Oct 2024 09:38), Max: 99.2 (04 Oct 2024 22:19)  HR: 82 (05 Oct 2024 09:38) (82 - 94)  BP: 121/82 (05 Oct 2024 09:38) (109/78 - 135/84)  BP(mean): --  RR: 18 (05 Oct 2024 09:38) (18 - 18)  SpO2: 92% (05 Oct 2024 09:38) (92% - 92%)    Parameters below as of 05 Oct 2024 09:38  Patient On (Oxygen Delivery Method): nasal cannula  O2 Flow (L/min): 4      CONSTITUTIONAL: NAD, well-developed  RESPIRATORY: Normal respiratory effort; lungs are clear to auscultation bilaterally  CARDIOVASCULAR: Regular rate and rhythm, normal S1 and S2, no murmur/rub/gallop; Peripheral pulses are 2+ bilaterally  ABDOMEN: Nontender to palpation, normoactive bowel sounds, no rebound/guarding  MUSCLOSKELETAL:  Moving all limbs spontaneously; No lower extremity edema  PSYCH: Affect appropriate    LABS:                        10.7   13.87 )-----------( 283      ( 05 Oct 2024 05:34 )             33.3     10-05    143  |  102  |  14  ----------------------------<  142[H]  3.9   |  26  |  0.93    Ca    9.2      05 Oct 2024 05:34  Phos  4.3     10-05  Mg     2.2     10-05    TPro  7.5  /  Alb  3.7  /  TBili  0.5  /  DBili  x   /  AST  17  /  ALT  7[L]  /  AlkPhos  119  10-05    PT/INR - ( 04 Oct 2024 23:19 )   PT: 13.6 sec;   INR: 1.20 ratio         PTT - ( 05 Oct 2024 05:34 )  PTT:45.5 sec        MICRO:  Urinalysis Basic - ( 05 Oct 2024 05:34 )    Color: x / Appearance: x / SG: x / pH: x  Gluc: 142 mg/dL / Ketone: x  / Bili: x / Urobili: x   Blood: x / Protein: x / Nitrite: x   Leuk Esterase: x / RBC: x / WBC x   Sq Epi: x / Non Sq Epi: x / Bacteria: x          IMAGING: PROGRESS NOTE:   Authored by: Stephanie Pedroza MD  PGY-1    Patient is a 69y old  Male who presents with a chief complaint of Pulmonary Embolism (05 Oct 2024 01:58)      SUBJECTIVE / OVERNIGHT EVENTS:  Pt AOx0 this morning, but is nodding no when asked is there anything that is bothering you.    MEDICATIONS  (STANDING):  aMIOdarone    Tablet 200 milliGRAM(s) Oral daily  amLODIPine   Tablet 10 milliGRAM(s) Oral daily  heparin  Infusion 1500 Unit(s)/Hr (16 mL/Hr) IV Continuous <Continuous>  metoprolol tartrate 25 milliGRAM(s) Oral two times a day  pantoprazole  Injectable 40 milliGRAM(s) IV Push daily  piperacillin/tazobactam IVPB.. 3.375 Gram(s) IV Intermittent every 8 hours  polyethylene glycol 3350 17 Gram(s) Oral daily  thiamine 100 milliGRAM(s) Oral daily    MEDICATIONS  (PRN):      CAPILLARY BLOOD GLUCOSE        I&O's Summary      PHYSICAL EXAM:  Vital Signs Last 24 Hrs  T(C): 37 (05 Oct 2024 09:38), Max: 37.3 (04 Oct 2024 22:19)  T(F): 98.6 (05 Oct 2024 09:38), Max: 99.2 (04 Oct 2024 22:19)  HR: 82 (05 Oct 2024 09:38) (82 - 94)  BP: 121/82 (05 Oct 2024 09:38) (109/78 - 135/84)  BP(mean): --  RR: 18 (05 Oct 2024 09:38) (18 - 18)  SpO2: 92% (05 Oct 2024 09:38) (92% - 92%)    Parameters below as of 05 Oct 2024 09:38  Patient On (Oxygen Delivery Method): nasal cannula  O2 Flow (L/min): 4    CONSTITUTIONAL: NAD  RESPIRATORY: Normal respiratory effort; bilat rhonchi  CARDIOVASCULAR: Regular rate and rhythm, normal S1 and S2, no murmur/rub/gallop; Peripheral pulses are 2+ bilaterally  ABDOMEN: Nontender to palpation, normoactive bowel sounds, no rebound/guarding  MUSCLOSKELETAL:  Moving all limbs spontaneously; No lower extremity edema    LABS:                        10.7   13.87 )-----------( 283      ( 05 Oct 2024 05:34 )             33.3     10-05    143  |  102  |  14  ----------------------------<  142[H]  3.9   |  26  |  0.93    Ca    9.2      05 Oct 2024 05:34  Phos  4.3     10-05  Mg     2.2     10-05    TPro  7.5  /  Alb  3.7  /  TBili  0.5  /  DBili  x   /  AST  17  /  ALT  7[L]  /  AlkPhos  119  10-05    PT/INR - ( 04 Oct 2024 23:19 )   PT: 13.6 sec;   INR: 1.20 ratio         PTT - ( 05 Oct 2024 05:34 )  PTT:45.5 sec        MICRO:  Urinalysis Basic - ( 05 Oct 2024 05:34 )    Color: x / Appearance: x / SG: x / pH: x  Gluc: 142 mg/dL / Ketone: x  / Bili: x / Urobili: x   Blood: x / Protein: x / Nitrite: x   Leuk Esterase: x / RBC: x / WBC x   Sq Epi: x / Non Sq Epi: x / Bacteria: x          IMAGING: < from: Xray Chest 1 View- PORTABLE-Urgent (Xray Chest 1 View- PORTABLE-Urgent .) (10.05.24 @ 03:15) >  IMPRESSION:  Postsurgical changes. No focal consolidation.    < end of copied text >

## 2024-10-05 NOTE — DIETITIAN INITIAL EVALUATION ADULT - REASON
-- Pt receiving Echocardiogram during RD visit; unable to complete Nutrition Focused Physical Exam during that time

## 2024-10-05 NOTE — H&P ADULT - TIME BILLING
reviewing prior documentation, independently obtaining a history, performing a physical examination, reviewing and independently interpreting tests/imaging, discussing the plan with the patient, ordering medications/tests, documenting clinical information in the electronic health record, and coordinating care with staff. Prolonged time (>30 mins) was required to personally review patient's extensive accompanying records/prior tests from OSH and managing his multiple comorbidities.

## 2024-10-05 NOTE — PROGRESS NOTE ADULT - PROBLEM SELECTOR PLAN 9
DVT Ppx: Heparin gtt.  Diet: Nutrition consult for tube feeds  Dispo: TBD DVT Ppx: Heparin gtt.  Diet: TF  Dispo: TBD

## 2024-10-05 NOTE — DIETITIAN INITIAL EVALUATION ADULT - PERTINENT MEDS FT
MEDICATIONS  (STANDING):  aMIOdarone    Tablet 200 milliGRAM(s) Oral daily  amLODIPine   Tablet 10 milliGRAM(s) Oral daily  heparin  Infusion 1500 Unit(s)/Hr (16 mL/Hr) IV Continuous <Continuous>  metoprolol tartrate 25 milliGRAM(s) Oral two times a day  pantoprazole  Injectable 40 milliGRAM(s) IV Push daily  piperacillin/tazobactam IVPB.. 3.375 Gram(s) IV Intermittent every 8 hours  polyethylene glycol 3350 17 Gram(s) Oral daily  thiamine 100 milliGRAM(s) Oral daily    MEDICATIONS  (PRN):

## 2024-10-05 NOTE — H&P ADULT - HISTORY OF PRESENT ILLNESS
Mr. Rodneyconnorjose Overton is a 69-year-old w/ [facility]  Mr. Jarrett Overton is a 69-year-old w/ PMH of HTN, T2DM, CVA, A. flutter, CAD s/p CABG x3 (~2018), and alcohol use disorder who initially presented to [facility] on 9/14 for a mechanical fall after he tripped and fell onto his R shoulder, denied any headstrike or LOC, subsequent XR showing R proximal humerus fracture with planned ORIF with Orthopedic Surgery. Course was c/b alcohol withdrawal and was started on CIWA protocol. On 9/18, RRT was called for AMS and patient was found to be in acute hypercapnic respiratory failure iso prior Librium dose. Patient was given flumazenil and then intubated and transferred to the MICU. ORIF of R humerus was cancelled and CT head showed signs of subacute and chronic subdural hematomas. Patient was extubated on 9/19 and repeat CT head showed stable hematomas. Following extubation, patient suffered from significant dysphagia but patient declined PEG tube offered by GI and instead NG tube was placed. On 10/4, patient was found to be hypoxic with subsequent CTA chest showing large PE of R main pulmonary artery. US of b/l LEs was further significant for b/l DVTs in the R peroneal vein and L popliteal, tibial trunk, peroneal, and posterior tibial veins. Patient was transferred to [facility] for further management of PE and embolectomy evaluation.

## 2024-10-05 NOTE — PROGRESS NOTE ADULT - ATTENDING COMMENTS
Patient is a 69 year old male, with PMH of HTN, DM2, ?CVA/TIA, Aflutter, CAD s/p CABG (2018), EtOH use disorder (two 40oz beers daily), current smoker (8 cigs/day), L total hip arthroplasty, initially admitted to [facility] (9/14/24-10/4/24) after mech fall c/b R prox humeral fx and EtOH withdrawal. Was on CIWA with Librium/Ativan PRN. TTE on 9/15/24 showed LVEF 60-65%, grad I DD, and an aortic valve prosthesis with normal function. Course c/b AMS, acute hypoxemic/hypercapnic respiratory, and agonal breathing on 9/18/24. Was initially given flumazenil x2 with some improvement, but was intubated for airway protection given excessive secretions and transferred to MICU 9/18/24. CTA chest was somewhat limited study but did not find PE at the time, noted RUL GGOs "suspicious for atypical or viral infection." CTH showed subacute and chronic SDHs as well as chronic lacunar infarct within L lentiform nucleus. Repeat CT showed stable SDHs. Extubated on 9/19/24. Pt was downgraded to Medicine floors on 9/21/24. Noted to have loss of voice and dysphagia. Evaluated by S+S, found dysphagia on video flouroscopy to all textures and recommended for PEG tube, but pt refused and opted for NGT placement (remains in place). Ortho surgery that was planned for humeral fx was cancelled and ultimately recommended for conservative management only. CXR on 10/3/24 showed new LLL infiltrate, started on Zosyn. On 10/4/24, pt desatted on supplemental O2, CTA chest found massive PE in R main bronchus with pulm trunk dilatation. BLE duplex also found b/l LE DVTs (L>R). Started on hep gtt and transferred to [facility] for embolectomy evaluation.    #Acute hypoxemic respiratory failure  #R main bronchus PE, b/l LE DVTs  #?PNA  #Dysphagia, hypophonia  #R humeral fx  #Chronic aflutter  #DM2  #EtOH/tobacco use disorder    - Vascular Cardiology following  - c/w hep gtt, monitoring anti-Xa q6h for now until therapeutic per Vasc Cards (target 0.4-0.6 given SDH)  - c/w empiric zosyn for now  - c/w amio and metoprolol (was on at OSH)  - c/w advair BID and duonebs q6h PRN  - c/w low ISS for now, monitor FS  - monitor on telemetry  - f/u repeat CTH to confirmed stability of SDH  - f/u MRI head given dysphagia/dysphonia  - f/u TTE  - S+S, nutrition, SBIRT eval  - Ortho consult once more stable   - start on NG feeds; dietitian recs noted   - wean O2 as tolerable   - VA duplex LE pending     Discussed with PCA at bedside.     Rest as above. Discussed with HS.

## 2024-10-05 NOTE — DIETITIAN INITIAL EVALUATION ADULT - REASON FOR ADMISSION
Chart Reviewed, Events Noted  "Patient is a 69y old  Male who presents with a chief complaint of Pulmonary Embolism"

## 2024-10-05 NOTE — PROGRESS NOTE ADULT - ASSESSMENT
Mr. Jarrett Overton is a 69-year-old w/ PMH of HTN, T2DM, CVA, A. flutter, CAD s/p CABG x3 (~2018), and alcohol use disorder who initially presented to [facility] on 9/14 for a mechanical fall. On 10/4, patient was found to be hypoxic with subsequent CTA chest showing large PE of R main pulmonary artery. US of b/l LEs was further significant for b/l DVTs in the R peroneal vein and L popliteal, tibial trunk, peroneal, and posterior tibial veins. Patient was transferred to [facility] for further management of PE and embolectomy evaluation.

## 2024-10-05 NOTE — DIETITIAN INITIAL EVALUATION ADULT - PROBLEM SELECTOR PLAN 4
- Initially presented to [facility] on 9/14 for a mechanical fall after he tripped and fell onto his R shoulder  - XR showing R proximal humerus fracture, ORIF with Orthopedic Surgery at [facility] cancelled due to MICU course  > Consult Orthopedic Surgery when stable

## 2024-10-05 NOTE — DIETITIAN INITIAL EVALUATION ADULT - ADD RECOMMEND
1. Defer diet/texture advancement to medical team/SLP as indicated   2. RD remains available to adjust EN formulary, volume/rate  3. Consider adding multivitamin & folic acid if no medical contraindications to secondary to history of Alcohol use disorder; Continue thiamine 100 mg 1x/Day   4. Monitor GI tolerance, skin integrity, labs, weight, and bowel movement regularity.  RD remains available upon request and will follow-up per protocol.

## 2024-10-05 NOTE — H&P ADULT - PROBLEM SELECTOR PLAN 4
- Initially presented to [facility] on 9/14 for a mechanical fall after he tripped and fell onto his R shoulder, denied any headstrike or LOC  - XR showing R proximal humerus fracture, ORIF with Orthopedic Surgery at [facility] cancelled due to MICU course  > Consult Orthopedic Surgery when stable - Initially presented to [facility] on 9/14 for a mechanical fall after he tripped and fell onto his R shoulder  - XR showing R proximal humerus fracture, ORIF with Orthopedic Surgery at [facility] cancelled due to MICU course  > Consult Orthopedic Surgery when stable

## 2024-10-05 NOTE — PROGRESS NOTE ADULT - PROBLEM SELECTOR PLAN 4
- Initially presented to [facility] on 9/14 for a mechanical fall after he tripped and fell onto his R shoulder  - XR showing R proximal humerus fracture, ORIF with Orthopedic Surgery at [facility] cancelled due to MICU course  > Consult Orthopedic Surgery when stable

## 2024-10-05 NOTE — CONSULT NOTE ADULT - ASSESSMENT
Assessment and Recommendations:    #Intermediate-High Risk PE  Patient with intermediate-possibly high risk PE with significant clot burden, troponin elevation, but no documented evidence of RV strain at this time.   Recs:  - trend trop/BNP/VBG w/ lactate q 6 hours  - c/w heparin gtt, q6 anti-Xa's until therapeutic then march out to daily. Can target tighter range 0.4-0.6 given questionable subdural hematoma. If no intervention determined by interventional team, can switch to NOAC  - please obtain duplex bilateral lower extremities  - monitor on cardiac telemetry  - needs formal TTE  - patient's chart has CD from OSH with images, needs to be uploaded as soon as possible for interventionalist to determine plan for intervention (if any)  - needs SLP eval in AM for dysphagia which is supposedly new  - would consider MRI brain as he did not have dysphagia prior to admission at OSH.   - needs ortho eval of right humerus fracture (apparently were going to pursue surgery once his mental status improved, which it has now)      These are preliminary recommendations. Please see attending attestation for final recommendations.

## 2024-10-05 NOTE — PROGRESS NOTE ADULT - PROBLEM SELECTOR PLAN 2
- Hospital course at [facility] c/b AHRF  - S/p intubation on 9/18 for likely benzodiazepine overdose, extubated on 9/19  - Diffuse rhonchi with copious secretions on exam  - CTA chest (10/4) -> Large PE of R main pulmonary artery, scattered GGOs and interstitial changes predominantly in R and L lower lobes  - Likely PE +/- superimposed aspiration PNA  > C/w management of PE, as above  > C/w empiric Zosyn  > C/w oxygen supplementation prn

## 2024-10-05 NOTE — DIETITIAN INITIAL EVALUATION ADULT - ENTERAL
As tolerated, recommend Glucerna 1.2 @ 10 mL/hr and advance by 10mL q12H until goal rate of 60 mL/hr x24 hrs is reached. Regimen at goal provides 1440mL total volume, 1159 mL free water, 1728 kcal/d and 86 g pro/day (25.5 kcal/kg and 1.27 g/kg) based on current  dosing weight 67.7 kg  -- Defer free water flushes to medical team

## 2024-10-05 NOTE — H&P ADULT - PROBLEM SELECTOR PLAN 1
- Hospital course at [facility] c/b AHRF  - CTA chest (10/4) -> Large PE of R main pulmonary artery  - US of b/l LEs -> R peroneal vein and L popliteal, tibial trunk, peroneal, and posterior tibial DVTs  - EKG showing evidence of right heart strain, S1Q3T3 changes, T wave depressions in precordial leads; not present on EKG from 9/15  - TTE (9/15) unremarkable  - Troponin 92, BNP 7168 on transfer  > C/w heparin gtt.  > Repeat TTE ordered  > Vascular cardiology consulted, will appreciate recs - Hospital course at [facility] c/b AHRF  - CTA chest (10/4) -> Large PE of R main pulmonary artery  - US of b/l LEs -> R peroneal vein and L popliteal, tibial trunk, peroneal, and posterior tibial DVTs  - EKG showing evidence of right heart strain, S1Q3T3 changes, T wave depressions in precordial leads; not present on EKG from 9/15  - TTE (9/15) unremarkable  - Troponin 92, BNP 7168 on transfer  > C/w heparin gtt.  > Repeat TTE ordered  > Repeat CT head ordered to confirm stability of SDHs iso heparin gtt.  > Vascular cardiology consulted, will appreciate recs

## 2024-10-05 NOTE — H&P ADULT - ASSESSMENT
Mr. Jarrett Overton is a 69-year-old w/ PMH of HTN, T2DM, CVA, A. flutter, CAD s/p CABG x3 (~2018), and alcohol use disorder who initially presented to [facility] on 9/14 for a mechanical fall. On 10/4, patient was found to be hypoxic with subsequent CTA chest showing large PE of R main pulmonary artery. US of b/l LEs was further significant for b/l DVTs in the R peroneal vein and L popliteal, tibial trunk, peroneal, and posterior tibial veins. Patient was transferred to [facility] for further management of PE and embolectomy evaluation.

## 2024-10-05 NOTE — DIETITIAN INITIAL EVALUATION ADULT - OTHER CALCULATIONS
-- Defer fluid needs to medical team  -- Estimated Nutrition needs based on current dosing weight of 67.7 kg/ 149.2 pounds

## 2024-10-05 NOTE — H&P ADULT - NSICDXPASTMEDICALHX_GEN_ALL_CORE_FT
PAST MEDICAL HISTORY:  Atrial flutter     CAD (coronary artery disease)     CVA (cerebrovascular accident)     History of alcohol use disorder     HTN (hypertension)     T2DM (type 2 diabetes mellitus)

## 2024-10-05 NOTE — DIETITIAN INITIAL EVALUATION ADULT - PROBLEM SELECTOR PLAN 3
- US of b/l LEs at [facility] -> R peroneal vein and L popliteal, tibial trunk, peroneal, and posterior tibial DVTs  > Repeat US of b/l LEs ordered  > C/w heparin gtt., as above

## 2024-10-05 NOTE — H&P ADULT - NSHPPHYSICALEXAM_GEN_ALL_CORE
CONSTITUTIONAL: NAD; well-developed  HEENT: PERRL, clear conjunctiva  RESPIRATORY: Normal respiratory effort; rhonchorous breath sounds bilaterally  CARDIOVASCULAR: Regular rate and rhythm, normal S1 and S2, no murmur/rub/gallop; No lower extremity edema; Peripheral pulses are 2+ bilaterally  ABDOMEN: Nontender to palpation, normoactive bowel sounds, no rebound/guarding; No hepatosplenomegaly  MUSCULOSKELETAL: No clubbing or cyanosis of digits; no joint swelling or tenderness to palpation  EXTREMITY: Lower extremities non-tender to palpation; non-erythematous B/L  NEURO: A&Ox1; no focal deficits   PSYCH: Normal mood; affect appropriate

## 2024-10-05 NOTE — H&P ADULT - PROBLEM SELECTOR PLAN 3
- US of b/l LEs at [facility] -> R peroneal vein and L popliteal, tibial trunk, peroneal, and posterior tibial DVTs  > Repeat US of b/l LEs ordered  > C/w heparin gtt., as above

## 2024-10-05 NOTE — PROGRESS NOTE ADULT - PROBLEM SELECTOR PLAN 3
- US of b/l LEs at [facility] -> R peroneal vein and L popliteal, tibial trunk, peroneal, and posterior tibial DVTs  > Repeat US of b/l LEs ordered  > C/w heparin gtt., as above

## 2024-10-05 NOTE — H&P ADULT - ATTENDING COMMENTS
Accompanying records from [facility] reviewed.    69M w/ hx of HTN, DM2, ?CVA/TIA, Aflutter, CAD s/p CABG (2018), EtOH use disorder (two 40oz beers daily), current smoker (8 cigs/day), L total hip arthroplasty, initially admitted to [facility] (9/14/24-10/4/24) after mech fall c/b R prox humeral fx and EtOH withdrawal. Was on CIWA with Librium/Ativan PRN. TTE on 9/15/24 showed LVEF 60-65%, grad I DD, and an aortic valve prosthesis with normal function. Course c/b AMS, acute hypoxemic/hypercapnic respiratory, and agonal breathing on 9/18/24. Was initially given flumazenil x2 with some improvement, but was intubated for airway protection given excessive secretions and transferred to MICU 9/18/24. CTA chest was somewhat limited study but did not find PE at the time, noted RUL GGOs "suspicious for atypical or viral infection." CTH showed subacute and chronic SDHs as well as chronic lacunar infarct within L lentiform nucleus. Repeat CT showed stable SDHs. Extubated on 9/19/24. Pt was downgraded to Medicine floors on 9/21/24. Noted to have loss of voice and dysphagia. Evaluated by S+S, found dysphagia on video flouroscopy to all textures and recommended for PEG tube, but pt refused and opted for NGT placement (remains in place). Ortho surgery that was planned for humeral fx was cancelled and ultimately recommended for conservative management only. CXR on 10/3/24 showed new LLL infiltrate, started on Zosyn. On 10/4/24, pt desatted on supplemental O2, CTA chest found massive PE in R main bronchus with pulm trunk dilatation. BLE duplex also found b/l LE DVTs (L>R). Started on hep gtt and transferred to [facility] for embolectomy evaluation.    On arrival to [facility], VSS on 4L O2NC. On my encounter, pt was AAOx~3 (oriented to self, hospital - but thought it was [place], month/yr). Exam with some rhonchi/wheezing, b/l LE edema (L>R), RUE in sling. Initial EKG suggestive of S1Q3T3 pattern and TWI in precordial leads. Labs notable for WBC 15k, hsTrop 92->84, proBNP 7k.    #Acute hypoxemic respiratory failure  #R main bronchus PE, b/l LE DVTs  #?PNA  #Dysphagia, hypophonia  #R humeral fx  #Chronic aflutter  #DM2  #EtOH/tobacco use disorder    - Appreciate Vascular Cardiology recs; f/u further recs in AM  - c/w hep gtt, monitoring anti-Xa q6h for now until therapeutic per Vasc Cards (target 0.4-0.6 given SDH)  - c/w empiric zosyn for now  - c/w amio and metoprolol (was on at OSH)  - c/w advair BID and duonebs q6h PRN  - c/w low ISS for now, monitor FS  - monitor on telemetry  - f/u repeat CTH to confirmed stability of SDH  - f/u MRI head given dysphagia/dysphonia  - f/u TTE  - S+S, nutrition, SBIRT eval  - Ortho consult in AM  - Rest of care as per resident plan above

## 2024-10-05 NOTE — DIETITIAN INITIAL EVALUATION ADULT - ORAL INTAKE PTA/DIET HISTORY
Per Chart,  no known food allergies/intolerances. RN reported to RD that OSH pt was receiving NGT of Jevity 1.2 @ 60ml/hr

## 2024-10-05 NOTE — H&P ADULT - NSHPLABSRESULTS_GEN_ALL_CORE
10.1   15.17 )-----------( 291      ( 04 Oct 2024 23:19 )             31.4       10-04    139  |  101  |  14  ----------------------------<  116[H]  4.1   |  25  |  0.94    Ca    9.0      04 Oct 2024 23:19  Phos  4.0     10-04  Mg     2.1     10-04    TPro  7.3  /  Alb  3.6  /  TBili  0.5  /  DBili  x   /  AST  19  /  ALT  7[L]  /  AlkPhos  118  10-04              Urinalysis Basic - ( 04 Oct 2024 23:19 )    Color: x / Appearance: x / SG: x / pH: x  Gluc: 116 mg/dL / Ketone: x  / Bili: x / Urobili: x   Blood: x / Protein: x / Nitrite: x   Leuk Esterase: x / RBC: x / WBC x   Sq Epi: x / Non Sq Epi: x / Bacteria: x        PT/INR - ( 04 Oct 2024 23:19 )   PT: 13.6 sec;   INR: 1.20 ratio         PTT - ( 04 Oct 2024 23:19 )  PTT:34.8 sec          CAPILLARY BLOOD GLUCOSE

## 2024-10-05 NOTE — CONSULT NOTE ADULT - SUBJECTIVE AND OBJECTIVE BOX
Cardiology Consult Note   [Please check [email or web address] password: [email or web address]" for cardiology service schedule and contact information]    HPI:  Mr. Jarrett Overton is a 69-year-old w/ PM  (05 Oct 2024 01:45)        PAST MEDICAL & SURGICAL HISTORY:    FAMILY HISTORY:    SOCIAL HISTORY:  unchanged    MEDICATIONS:  heparin  Infusion 15 Unit(s)/Hr IV Continuous <Continuous>                    -------------------------------------------------------------------------------------------  PHYSICAL EXAM:  T(C): 37.3 (10-04-24 @ 22:19), Max: 37.3 (10-04-24 @ 22:19)  HR: 94 (10-04-24 @ 22:19) (94 - 94)  BP: 109/78 (10-04-24 @ 22:19) (109/78 - 109/78)  RR: 18 (10-04-24 @ 22:19) (18 - 18)  SpO2: 92% (10-04-24 @ 22:19) (92% - 92%)  Wt(kg): --  I&O's Summary      GENERAL: Patient is alert, awake, and in no acute distress  HEENT: Moist mucous membranes. No conjunctival injection. No JVD  CHEST/LUNG: Clear to auscultation bilaterally; No wheezing. Unlabored respirations.  HEART: Regular rate and rhythm; No murmurs. Radial pulses 2+.  ABDOMEN: Bowel sounds present; Soft, Nontender, Nondistended.   EXTREMITIES:  No lower extremity tenderness. No lower extremity edema.  NEURO: Aox3, no focal deficits    -------------------------------------------------------------------------------------------  LABS:                          10.1   15.17 )-----------( 291      ( 04 Oct 2024 23:19 )             31.4     10-04    139  |  101  |  14  ----------------------------<  116[H]  4.1   |  25  |  0.94    Ca    9.0      04 Oct 2024 23:19  Phos  4.0     10-04  Mg     2.1     10-04    TPro  7.3  /  Alb  3.6  /  TBili  0.5  /  DBili  x   /  AST  19  /  ALT  7[L]  /  AlkPhos  118  10-04    PT/INR - ( 04 Oct 2024 23:19 )   PT: 13.6 sec;   INR: 1.20 ratio         PTT - ( 04 Oct 2024 23:19 )  PTT:34.8 sec  CARDIAC MARKERS ( 04 Oct 2024 23:19 )  92 ng/L / x     / x     / x     / x     / x                  -------------------------------------------------------------------------------------------  Cardiovascular Diagnostic Testing:    ECG:     Echo:     Stress Testing:    Cath:    -------------------------------------------------------------------------------------------    Assessment and Recommendations:              These are preliminary recommendations. Please see attending attestation for final recommendations.  Cardiology Consult Note   [Please check [email or web address] password: [email or web address]" for cardiology service schedule and contact information]    HPI:  Mr. Jarrett Overton is a 69-year-old w/ PMH of CVA (unknown when), CAD s/p CABG 6 years ago, Atrial flutter (not on AC, unknown why) who presented to [facility] on 9/14 after an unwitnessed fall where he hit his head and fractured his right humeral head/neck. Transferred for large right main pulmonary artery PE.    He was previously reportedly functional until the fall. Was admitted and found to have AMS, CTH was done on 9/18 that thought possible chronic subdural hematoma and an old left basal ganglia lacunar infarct. CTH was repeated on 9/19 that showed small hygroma and that he did not have a subdural. Either way, he was found to be hypoxic and altered, had a PNA, was intubated, stayed in ICU, then got extubated and downgraded to floor but then developed new onset hypoxia and tachycardiac. CTPE was done that showed 1.8 x1.4 cm intraluminary right main artery PE vs thrombus with emboli distally, thrombi in main pulm outflow tract, heterogenous opacity of the right main at level of hilum. His BP was 138/86, HR 87. Troponin had not resulted. TTE was not done. He was started on heparin and transfer was initiated to [facility]. Of note, he developed dysphagia during this admission, unknown as to why., He also has this humerus fracture that was going to be intervened upon however due to his initial AMS, ortho deferred for later.     He was admitted floor to floor transfer. He is hemodynamically stable, and is oriented to person, place, year. He was able to tell me why he presented to the hospital and why he was sent here. He has a hoarse voice and secretions are coming up out of his mouth, presumably because of the dysphagia. However, he states that he feels well, no chest pain or dyspnea. NGT in place due to dysphagia.         PAST MEDICAL & SURGICAL HISTORY:    FAMILY HISTORY:    SOCIAL HISTORY:  unchanged    MEDICATIONS:  heparin  Infusion 15 Unit(s)/Hr IV Continuous <Continuous>                    -------------------------------------------------------------------------------------------  PHYSICAL EXAM:  T(C): 37.3 (10-04-24 @ 22:19), Max: 37.3 (10-04-24 @ 22:19)  HR: 94 (10-04-24 @ 22:19) (94 - 94)  BP: 109/78 (10-04-24 @ 22:19) (109/78 - 109/78)  RR: 18 (10-04-24 @ 22:19) (18 - 18)  SpO2: 92% (10-04-24 @ 22:19) (92% - 92%)  Wt(kg): --  I&O's Summary      GENERAL: Patient is alert, awake, and in no acute distress. Appears frail  HEENT: Moist mucous membranes. No conjunctival injection. No JVD. NG in place  CHEST/LUNG: Clear to auscultation bilaterally; No wheezing. Unlabored respirations.  HEART: Regular rate and rhythm; No murmurs. Radial pulses 2+.  ABDOMEN: Bowel sounds present; Soft, Nontender, Nondistended.   EXTREMITIES:  No lower extremity tenderness. No lower extremity edema.  NEURO: Aox3, no focal deficits    -------------------------------------------------------------------------------------------  LABS:                          10.1   15.17 )-----------( 291      ( 04 Oct 2024 23:19 )             31.4     10-04    139  |  101  |  14  ----------------------------<  116[H]  4.1   |  25  |  0.94    Ca    9.0      04 Oct 2024 23:19  Phos  4.0     10-04  Mg     2.1     10-04    TPro  7.3  /  Alb  3.6  /  TBili  0.5  /  DBili  x   /  AST  19  /  ALT  7[L]  /  AlkPhos  118  10-04    PT/INR - ( 04 Oct 2024 23:19 )   PT: 13.6 sec;   INR: 1.20 ratio         PTT - ( 04 Oct 2024 23:19 )  PTT:34.8 sec  CARDIAC MARKERS ( 04 Oct 2024 23:19 )  92 ng/L / x     / x     / x     / x     / x                  -------------------------------------------------------------------------------------------  Cardiovascular Diagnostic Testing:    ECG: NSR 90s with LAFB, RBBB, no signs of RV strain, as read by me    Echo: pending      -------------------------------------------------------------------------------------------

## 2024-10-05 NOTE — PROGRESS NOTE ADULT - PROBLEM SELECTOR PLAN 8
- Significant dysphagia s/p extubation on 9/19  - Patient declined PEG tube, NG tube placed instead  > Repeat S&S eval prn  > MRI brain ordered

## 2024-10-05 NOTE — DIETITIAN INITIAL EVALUATION ADULT - PROBLEM SELECTOR PLAN 1
- Hospital course at [facility] c/b AHRF  - CTA chest (10/4) -> Large PE of R main pulmonary artery  - US of b/l LEs -> R peroneal vein and L popliteal, tibial trunk, peroneal, and posterior tibial DVTs  - EKG showing evidence of right heart strain, S1Q3T3 changes, T wave depressions in precordial leads; not present on EKG from 9/15  - TTE (9/15) unremarkable  - Troponin 92, BNP 7168 on transfer  > C/w heparin gtt.  > Repeat TTE ordered  > Repeat CT head ordered to confirm stability of SDHs iso heparin gtt.  > Vascular cardiology consulted, will appreciate recs

## 2024-10-06 LAB
ALBUMIN SERPL ELPH-MCNC: 3.5 G/DL — SIGNIFICANT CHANGE UP (ref 3.3–5)
ALP SERPL-CCNC: 109 U/L — SIGNIFICANT CHANGE UP (ref 40–120)
ALT FLD-CCNC: 10 U/L — SIGNIFICANT CHANGE UP (ref 10–45)
ANION GAP SERPL CALC-SCNC: 13 MMOL/L — SIGNIFICANT CHANGE UP (ref 5–17)
APTT BLD: 69.8 SEC — HIGH (ref 24.5–35.6)
AST SERPL-CCNC: 17 U/L — SIGNIFICANT CHANGE UP (ref 10–40)
BASOPHILS # BLD AUTO: 0.07 K/UL — SIGNIFICANT CHANGE UP (ref 0–0.2)
BASOPHILS NFR BLD AUTO: 0.6 % — SIGNIFICANT CHANGE UP (ref 0–2)
BILIRUB SERPL-MCNC: 0.6 MG/DL — SIGNIFICANT CHANGE UP (ref 0.2–1.2)
BUN SERPL-MCNC: 12 MG/DL — SIGNIFICANT CHANGE UP (ref 7–23)
CALCIUM SERPL-MCNC: 9.2 MG/DL — SIGNIFICANT CHANGE UP (ref 8.4–10.5)
CHLORIDE SERPL-SCNC: 101 MMOL/L — SIGNIFICANT CHANGE UP (ref 96–108)
CO2 SERPL-SCNC: 27 MMOL/L — SIGNIFICANT CHANGE UP (ref 22–31)
CREAT SERPL-MCNC: 0.88 MG/DL — SIGNIFICANT CHANGE UP (ref 0.5–1.3)
EGFR: 94 ML/MIN/1.73M2 — SIGNIFICANT CHANGE UP
EOSINOPHIL # BLD AUTO: 0.11 K/UL — SIGNIFICANT CHANGE UP (ref 0–0.5)
EOSINOPHIL NFR BLD AUTO: 0.9 % — SIGNIFICANT CHANGE UP (ref 0–6)
GAS PNL BLDV: SIGNIFICANT CHANGE UP
GAS PNL BLDV: SIGNIFICANT CHANGE UP
GLUCOSE BLDC GLUCOMTR-MCNC: 126 MG/DL — HIGH (ref 70–99)
GLUCOSE BLDC GLUCOMTR-MCNC: 143 MG/DL — HIGH (ref 70–99)
GLUCOSE BLDC GLUCOMTR-MCNC: 143 MG/DL — HIGH (ref 70–99)
GLUCOSE BLDC GLUCOMTR-MCNC: 150 MG/DL — HIGH (ref 70–99)
GLUCOSE BLDC GLUCOMTR-MCNC: 161 MG/DL — HIGH (ref 70–99)
GLUCOSE SERPL-MCNC: 146 MG/DL — HIGH (ref 70–99)
HCT VFR BLD CALC: 31.4 % — LOW (ref 39–50)
HGB BLD-MCNC: 9.9 G/DL — LOW (ref 13–17)
IMM GRANULOCYTES NFR BLD AUTO: 0.5 % — SIGNIFICANT CHANGE UP (ref 0–0.9)
LYMPHOCYTES # BLD AUTO: 1.31 K/UL — SIGNIFICANT CHANGE UP (ref 1–3.3)
LYMPHOCYTES # BLD AUTO: 11 % — LOW (ref 13–44)
MAGNESIUM SERPL-MCNC: 2.2 MG/DL — SIGNIFICANT CHANGE UP (ref 1.6–2.6)
MCHC RBC-ENTMCNC: 29.9 PG — SIGNIFICANT CHANGE UP (ref 27–34)
MCHC RBC-ENTMCNC: 31.5 GM/DL — LOW (ref 32–36)
MCV RBC AUTO: 94.9 FL — SIGNIFICANT CHANGE UP (ref 80–100)
MONOCYTES # BLD AUTO: 0.94 K/UL — HIGH (ref 0–0.9)
MONOCYTES NFR BLD AUTO: 7.9 % — SIGNIFICANT CHANGE UP (ref 2–14)
NEUTROPHILS # BLD AUTO: 9.42 K/UL — HIGH (ref 1.8–7.4)
NEUTROPHILS NFR BLD AUTO: 79.1 % — HIGH (ref 43–77)
NRBC # BLD: 0 /100 WBCS — SIGNIFICANT CHANGE UP (ref 0–0)
NT-PROBNP SERPL-SCNC: 3219 PG/ML — HIGH (ref 0–300)
NT-PROBNP SERPL-SCNC: 3892 PG/ML — HIGH (ref 0–300)
PHOSPHATE SERPL-MCNC: 4.4 MG/DL — SIGNIFICANT CHANGE UP (ref 2.5–4.5)
PLATELET # BLD AUTO: 269 K/UL — SIGNIFICANT CHANGE UP (ref 150–400)
POTASSIUM SERPL-MCNC: 3.8 MMOL/L — SIGNIFICANT CHANGE UP (ref 3.5–5.3)
POTASSIUM SERPL-SCNC: 3.8 MMOL/L — SIGNIFICANT CHANGE UP (ref 3.5–5.3)
PROT SERPL-MCNC: 7.3 G/DL — SIGNIFICANT CHANGE UP (ref 6–8.3)
RBC # BLD: 3.31 M/UL — LOW (ref 4.2–5.8)
RBC # FLD: 13.3 % — SIGNIFICANT CHANGE UP (ref 10.3–14.5)
SODIUM SERPL-SCNC: 141 MMOL/L — SIGNIFICANT CHANGE UP (ref 135–145)
TROPONIN T, HIGH SENSITIVITY RESULT: 68 NG/L — HIGH (ref 0–51)
TROPONIN T, HIGH SENSITIVITY RESULT: 70 NG/L — HIGH (ref 0–51)
UFH PPP CHRO-ACNC: 0.43 IU/ML — SIGNIFICANT CHANGE UP (ref 0.3–0.7)
UFH PPP CHRO-ACNC: 0.46 IU/ML — SIGNIFICANT CHANGE UP (ref 0.3–0.7)
WBC # BLD: 11.91 K/UL — HIGH (ref 3.8–10.5)
WBC # FLD AUTO: 11.91 K/UL — HIGH (ref 3.8–10.5)

## 2024-10-06 PROCEDURE — 99232 SBSQ HOSP IP/OBS MODERATE 35: CPT | Mod: GC

## 2024-10-06 PROCEDURE — 71045 X-RAY EXAM CHEST 1 VIEW: CPT | Mod: 26

## 2024-10-06 RX ORDER — SODIUM CHLORIDE 9 MG/ML
4 INJECTION, SOLUTION INTRAMUSCULAR; INTRAVENOUS; SUBCUTANEOUS EVERY 12 HOURS
Refills: 0 | Status: COMPLETED | OUTPATIENT
Start: 2024-10-06 | End: 2024-10-10

## 2024-10-06 RX ORDER — METOPROLOL TARTRATE 50 MG
25 TABLET ORAL EVERY 12 HOURS
Refills: 0 | Status: DISCONTINUED | OUTPATIENT
Start: 2024-10-06 | End: 2024-10-06

## 2024-10-06 RX ORDER — METOPROLOL TARTRATE 50 MG
5 TABLET ORAL EVERY 6 HOURS
Refills: 0 | Status: DISCONTINUED | OUTPATIENT
Start: 2024-10-06 | End: 2024-10-08

## 2024-10-06 RX ADMIN — PIPERACILLIN AND TAZOBACTAM 25 GRAM(S): .5; 4 INJECTION, POWDER, LYOPHILIZED, FOR SOLUTION INTRAVENOUS at 21:20

## 2024-10-06 RX ADMIN — SODIUM CHLORIDE 4 MILLILITER(S): 9 INJECTION, SOLUTION INTRAMUSCULAR; INTRAVENOUS; SUBCUTANEOUS at 12:27

## 2024-10-06 RX ADMIN — HEPARIN SODIUM 16 UNIT(S)/HR: 10000 INJECTION INTRAVENOUS; SUBCUTANEOUS at 19:31

## 2024-10-06 RX ADMIN — PANTOPRAZOLE SODIUM 40 MILLIGRAM(S): 40 TABLET, DELAYED RELEASE ORAL at 11:50

## 2024-10-06 RX ADMIN — PIPERACILLIN AND TAZOBACTAM 25 GRAM(S): .5; 4 INJECTION, POWDER, LYOPHILIZED, FOR SOLUTION INTRAVENOUS at 13:34

## 2024-10-06 RX ADMIN — FLUTICASONE PROPIONATE AND SALMETEROL XINAFOATE 1 DOSE(S): 230; 21 AEROSOL, METERED RESPIRATORY (INHALATION) at 18:03

## 2024-10-06 RX ADMIN — HEPARIN SODIUM 16 UNIT(S)/HR: 10000 INJECTION INTRAVENOUS; SUBCUTANEOUS at 07:34

## 2024-10-06 RX ADMIN — FLUTICASONE PROPIONATE AND SALMETEROL XINAFOATE 1 DOSE(S): 230; 21 AEROSOL, METERED RESPIRATORY (INHALATION) at 05:03

## 2024-10-06 RX ADMIN — PIPERACILLIN AND TAZOBACTAM 25 GRAM(S): .5; 4 INJECTION, POWDER, LYOPHILIZED, FOR SOLUTION INTRAVENOUS at 05:01

## 2024-10-06 RX ADMIN — HEPARIN SODIUM 16 UNIT(S)/HR: 10000 INJECTION INTRAVENOUS; SUBCUTANEOUS at 07:47

## 2024-10-06 RX ADMIN — Medication 5 MILLIGRAM(S): at 17:14

## 2024-10-06 RX ADMIN — SODIUM CHLORIDE 4 MILLILITER(S): 9 INJECTION, SOLUTION INTRAMUSCULAR; INTRAVENOUS; SUBCUTANEOUS at 17:14

## 2024-10-06 RX ADMIN — Medication 5 MILLIGRAM(S): at 11:49

## 2024-10-06 NOTE — PROGRESS NOTE ADULT - PROBLEM SELECTOR PLAN 2
- Hospital course at [facility] c/b AHRF  - S/p intubation on 9/18 for likely benzodiazepine overdose, extubated on 9/19  - Diffuse rhonchi with copious secretions on exam  - CTA chest (10/4) -> Large PE of R main pulmonary artery, scattered GGOs and interstitial changes predominantly in R and L lower lobes  - Likely PE +/- superimposed aspiration PNA  > C/w management of PE, as above  > C/w empiric Zosyn  > C/w oxygen supplementation prn

## 2024-10-06 NOTE — PROGRESS NOTE ADULT - ATTENDING COMMENTS
Hep gtt transition to DOAC pending neurosurgery evaluation.    A total of 35 minutes was spent on this patient encounter.

## 2024-10-06 NOTE — PROGRESS NOTE ADULT - PROBLEM SELECTOR PLAN 1
- Hospital course at [facility] c/b AHRF  - CTA chest (10/4) -> Large PE of R main pulmonary artery  - US of b/l LEs -> R peroneal vein and L popliteal, tibial trunk, peroneal, and posterior tibial DVTs  - EKG showing evidence of right heart strain, S1Q3T3 changes, T wave depressions in precordial leads; not present on EKG from 9/15  - TTE (9/15) unremarkable  - Troponin 92, BNP 7168 on transfer  > C/w heparin gtt.  > Repeat TTE ordered  > Repeat CT head ordered to confirm stability of SDHs iso heparin gtt.  > Vascular cardiology consulted rec heparin  - Neurosurg consulted for AC management, they want CT head - Hospital course at [facility] c/b AHRF  - CTA chest (10/4) -> Large PE of R main pulmonary artery  - US of b/l LEs -> R peroneal vein and L popliteal, tibial trunk, peroneal, and posterior tibial DVTs  - EKG showing evidence of right heart strain, S1Q3T3 changes, T wave depressions in precordial leads; not present on EKG from 9/15  - TTE (9/15) unremarkable  - Troponin 92, BNP 7168 on transfer  > Pending CTH  > C/w heparin gtt w/ target PTT-> 0.4-0.6 Repeat TTE ordered  > Vascular cardiology consulted rec heparin for now and eventual transition to DOAC  - Neurosurg consulted for AC management, they want CT head

## 2024-10-06 NOTE — PROGRESS NOTE ADULT - PROBLEM SELECTOR PLAN 3
- US of b/l LEs at [facility] -> R peroneal vein and L popliteal, tibial trunk, peroneal, and posterior tibial DVTs  > Repeat US of b/l LEs ordered  > C/w heparin gtt., as above

## 2024-10-06 NOTE — PROGRESS NOTE ADULT - SUBJECTIVE AND OBJECTIVE BOX
Patient seen and examined at bedside.    Overnight Events:   Confused and removed NGT - now in restraints  ROS limited by inability to have full conversation       REVIEW OF SYSTEMS:  All other review of systems is negative unless indicated above.            Current Meds:  albuterol/ipratropium for Nebulization 3 milliLiter(s) Nebulizer every 6 hours PRN  aMIOdarone    Tablet 200 milliGRAM(s) Oral daily  amLODIPine   Tablet 10 milliGRAM(s) Oral daily  dextrose 5%. 1000 milliLiter(s) IV Continuous <Continuous>  dextrose 5%. 1000 milliLiter(s) IV Continuous <Continuous>  dextrose 50% Injectable 25 Gram(s) IV Push once  dextrose 50% Injectable 12.5 Gram(s) IV Push once  dextrose 50% Injectable 25 Gram(s) IV Push once  dextrose Oral Gel 15 Gram(s) Oral once  fluticasone propionate/ salmeterol 250-50 MICROgram(s) Diskus 1 Dose(s) Inhalation two times a day  glucagon  Injectable 1 milliGRAM(s) IntraMuscular once  heparin  Infusion 1500 Unit(s)/Hr IV Continuous <Continuous>  insulin lispro (ADMELOG) corrective regimen sliding scale   SubCutaneous at bedtime  insulin lispro (ADMELOG) corrective regimen sliding scale   SubCutaneous three times a day before meals  metoprolol tartrate 25 milliGRAM(s) Oral two times a day  pantoprazole  Injectable 40 milliGRAM(s) IV Push daily  piperacillin/tazobactam IVPB.. 3.375 Gram(s) IV Intermittent every 8 hours  polyethylene glycol 3350 17 Gram(s) Oral daily  thiamine 100 milliGRAM(s) Oral daily      Vitals:  T(F): 98.3 (10-06), Max: 99.9 (10-05)  HR: 93 (10-06) (82 - 94)  BP: 113/81 (10-06) (105/70 - 121/82)  RR: 18 (10-06)  SpO2: 97% (10-06)  I&O's Summary    05 Oct 2024 07:01  -  06 Oct 2024 07:00  --------------------------------------------------------  IN: 0 mL / OUT: 500 mL / NET: -500 mL        Physical Exam:  Appearance: No acute distress;  Eyes:  EOMI  HEENT: Normal oral mucosa  Cardiovascular: RRR, S1, S2, + murmur  Respiratory: upper airway sounds  Gastrointestinal: soft, non-tender, non-distended  Neurologic: Non-focal  Psychiatry: AAOx2                          9.9    11.91 )-----------( 269      ( 06 Oct 2024 05:00 )             31.4     10-06    141  |  101  |  12  ----------------------------<  146[H]  3.8   |  27  |  0.88    Ca    9.2      06 Oct 2024 05:00  Phos  4.4     10-06  Mg     2.2     10-06    TPro  7.3  /  Alb  3.5  /  TBili  0.6  /  DBili  x   /  AST  17  /  ALT  10  /  AlkPhos  109  10-06    PT/INR - ( 04 Oct 2024 23:19 )   PT: 13.6 sec;   INR: 1.20 ratio         PTT - ( 06 Oct 2024 04:59 )  PTT:69.8 sec          TTE 10/5  CONCLUSIONS:      1. Left ventricular cavity is small. Left ventricular systolic function is normal. There are no regional wall motion abnormalities seen.   2. A bioprosthetic valve replacement valve replacement is present in the aortic position The prosthetic valve has normal function.   3. Mildly enlarged right ventricular cavity size and reduced right ventricular systolic function.   4. Normal pulmonary artery pressure.   5. No prior echocardiogram is available for comparison.     Patient seen and examined at bedside.    Overnight Events:   Confused and removed NGT - now in restraints  ROS limited by inability to have full conversation       REVIEW OF SYSTEMS:  All other review of systems is negative unless indicated above.            Current Meds:  albuterol/ipratropium for Nebulization 3 milliLiter(s) Nebulizer every 6 hours PRN  aMIOdarone    Tablet 200 milliGRAM(s) Oral daily  amLODIPine   Tablet 10 milliGRAM(s) Oral daily  dextrose 5%. 1000 milliLiter(s) IV Continuous <Continuous>  dextrose 5%. 1000 milliLiter(s) IV Continuous <Continuous>  dextrose 50% Injectable 25 Gram(s) IV Push once  dextrose 50% Injectable 12.5 Gram(s) IV Push once  dextrose 50% Injectable 25 Gram(s) IV Push once  dextrose Oral Gel 15 Gram(s) Oral once  fluticasone propionate/ salmeterol 250-50 MICROgram(s) Diskus 1 Dose(s) Inhalation two times a day  glucagon  Injectable 1 milliGRAM(s) IntraMuscular once  heparin  Infusion 1500 Unit(s)/Hr IV Continuous <Continuous>  insulin lispro (ADMELOG) corrective regimen sliding scale   SubCutaneous at bedtime  insulin lispro (ADMELOG) corrective regimen sliding scale   SubCutaneous three times a day before meals  metoprolol tartrate 25 milliGRAM(s) Oral two times a day  pantoprazole  Injectable 40 milliGRAM(s) IV Push daily  piperacillin/tazobactam IVPB.. 3.375 Gram(s) IV Intermittent every 8 hours  polyethylene glycol 3350 17 Gram(s) Oral daily  thiamine 100 milliGRAM(s) Oral daily      Vitals:  T(F): 98.3 (10-06), Max: 99.9 (10-05)  HR: 93 (10-06) (82 - 94)  BP: 113/81 (10-06) (105/70 - 121/82)  RR: 18 (10-06)  SpO2: 97% (10-06)  I&O's Summary    05 Oct 2024 07:01  -  06 Oct 2024 07:00  --------------------------------------------------------  IN: 0 mL / OUT: 500 mL / NET: -500 mL        Physical Exam:  Appearance: No acute distress;  Eyes:  EOMI  HEENT: Normal oral mucosa  Cardiovascular: RRR, S1, S2, +systolic murmur  Respiratory: upper airway sounds  Gastrointestinal: soft, non-tender, non-distended  Neurologic: Non-focal  Psychiatry: AAOx2                          9.9    11.91 )-----------( 269      ( 06 Oct 2024 05:00 )             31.4     10-06    141  |  101  |  12  ----------------------------<  146[H]  3.8   |  27  |  0.88    Ca    9.2      06 Oct 2024 05:00  Phos  4.4     10-06  Mg     2.2     10-06    TPro  7.3  /  Alb  3.5  /  TBili  0.6  /  DBili  x   /  AST  17  /  ALT  10  /  AlkPhos  109  10-06    PT/INR - ( 04 Oct 2024 23:19 )   PT: 13.6 sec;   INR: 1.20 ratio         PTT - ( 06 Oct 2024 04:59 )  PTT:69.8 sec          TTE 10/5  CONCLUSIONS:      1. Left ventricular cavity is small. Left ventricular systolic function is normal. There are no regional wall motion abnormalities seen.   2. A bioprosthetic valve replacement valve replacement is present in the aortic position The prosthetic valve has normal function.   3. Mildly enlarged right ventricular cavity size and reduced right ventricular systolic function.   4. Normal pulmonary artery pressure.   5. No prior echocardiogram is available for comparison.

## 2024-10-06 NOTE — PROGRESS NOTE ADULT - SUBJECTIVE AND OBJECTIVE BOX
Internal Medicine   Mishel Ro | PGY-3    OVERNIGHT EVENTS: No acute overnight events.    SUBJECTIVE:       MEDICATIONS  (STANDING):  aMIOdarone    Tablet 200 milliGRAM(s) Oral daily  amLODIPine   Tablet 10 milliGRAM(s) Oral daily  dextrose 5%. 1000 milliLiter(s) (50 mL/Hr) IV Continuous <Continuous>  dextrose 5%. 1000 milliLiter(s) (100 mL/Hr) IV Continuous <Continuous>  dextrose 50% Injectable 12.5 Gram(s) IV Push once  dextrose 50% Injectable 25 Gram(s) IV Push once  dextrose 50% Injectable 25 Gram(s) IV Push once  dextrose Oral Gel 15 Gram(s) Oral once  fluticasone propionate/ salmeterol 250-50 MICROgram(s) Diskus 1 Dose(s) Inhalation two times a day  glucagon  Injectable 1 milliGRAM(s) IntraMuscular once  heparin  Infusion 1500 Unit(s)/Hr (16 mL/Hr) IV Continuous <Continuous>  insulin lispro (ADMELOG) corrective regimen sliding scale   SubCutaneous at bedtime  insulin lispro (ADMELOG) corrective regimen sliding scale   SubCutaneous three times a day before meals  metoprolol tartrate 25 milliGRAM(s) Oral two times a day  pantoprazole  Injectable 40 milliGRAM(s) IV Push daily  piperacillin/tazobactam IVPB.. 3.375 Gram(s) IV Intermittent every 8 hours  polyethylene glycol 3350 17 Gram(s) Oral daily  thiamine 100 milliGRAM(s) Oral daily    MEDICATIONS  (PRN):  albuterol/ipratropium for Nebulization 3 milliLiter(s) Nebulizer every 6 hours PRN Shortness of Breath and/or Wheezing        T(F): 98.3 (10-06-24 @ 04:03), Max: 99.9 (10-05-24 @ 16:53)  HR: 93 (10-06-24 @ 04:03) (82 - 94)  BP: 113/81 (10-06-24 @ 04:03) (105/70 - 121/82)  BP(mean): --  RR: 18 (10-06-24 @ 04:03) (18 - 18)  SpO2: 97% (10-06-24 @ 04:03) (92% - 99%)    PHYSICAL EXAM:     GENERAL: NAD, lying in bed comfortably  HEAD:  Atraumatic, Normocephalic  EYES: EOMI, PERRLA, conjunctiva and sclera clear, no nystagmus noted  ENT: Moist mucous membranes,   NECK: Supple, No JVD, trachea midline  CHEST/LUNG: Clear to auscultation bilaterally; No rales, rhonchi, wheezing, or rubs. Unlabored respirations  HEART: Regular rate and rhythm; No murmurs, rubs, or gallops, normal S1/S2  ABDOMEN: normal bowel sounds; Soft, nontender, nondistended, no organomegaly   EXTREMITIES:  2+ Peripheral Pulses, brisk capillary refill. No clubbing, cyanosis, or edema  MSK: No gross deformities noted   Neurological:  A&Ox3, no focal deficits   SKIN: No rashes or lesions  PSYCH: Normal mood, affect     TELEMETRY:    LABS:                        9.9    11.91 )-----------( 269      ( 06 Oct 2024 05:00 )             31.4     10-06    141  |  101  |  12  ----------------------------<  146[H]  3.8   |  27  |  0.88    Ca    9.2      06 Oct 2024 05:00  Phos  4.4     10-06  Mg     2.2     10-06    TPro  7.3  /  Alb  3.5  /  TBili  0.6  /  DBili  x   /  AST  17  /  ALT  10  /  AlkPhos  109  10-06        PT/INR - ( 04 Oct 2024 23:19 )   PT: 13.6 sec;   INR: 1.20 ratio         PTT - ( 06 Oct 2024 04:59 )  PTT:69.8 sec  Blood Gas Profile w/Lytes - Venous: Performed In Lab (10-06-24 @ 05:00)  Blood Gas Profile w/Lytes - Venous: Performed In Lab (10-06-24 @ 00:54)  Blood Gas Profile w/Lytes - Venous: Performed In Lab (10-05-24 @ 18:19)  Blood Gas Profile w/Lytes - Venous: Performed In Lab (10-05-24 @ 10:38)    Creatinine Trend: 0.88<--, 0.93<--, 0.94<--  I&O's Summary    05 Oct 2024 07:01  -  06 Oct 2024 06:25  --------------------------------------------------------  IN: 0 mL / OUT: 300 mL / NET: -300 mL      BNP  Blood Gas Profile w/Lytes - Venous: Performed In Lab (10-06-24 @ 05:00)  Blood Gas Profile w/Lytes - Venous: Performed In Lab (10-06-24 @ 00:54)  Blood Gas Profile w/Lytes - Venous: Performed In Lab (10-05-24 @ 18:19)  Blood Gas Profile w/Lytes - Venous: Performed In Lab (10-05-24 @ 10:38)    RADIOLOGY & ADDITIONAL STUDIES:             Internal Medicine   Mishel Benjamin | PGY-3    OVERNIGHT EVENTS: Pt pulled out NGT overnight.    SUBJECTIVE: Patient was seen and examined at bedside this morning. Patient responding appropriately to questions and able to make needs known. Pt AOx3 this AM. Attempted to replace NGT in both nostrils multiple times without success as there was much resistance met on each attempt. Vital signs/imaging/telemetry events reviewed.       MEDICATIONS  (STANDING):  aMIOdarone    Tablet 200 milliGRAM(s) Oral daily  amLODIPine   Tablet 10 milliGRAM(s) Oral daily  dextrose 5%. 1000 milliLiter(s) (50 mL/Hr) IV Continuous <Continuous>  dextrose 5%. 1000 milliLiter(s) (100 mL/Hr) IV Continuous <Continuous>  dextrose 50% Injectable 12.5 Gram(s) IV Push once  dextrose 50% Injectable 25 Gram(s) IV Push once  dextrose 50% Injectable 25 Gram(s) IV Push once  dextrose Oral Gel 15 Gram(s) Oral once  fluticasone propionate/ salmeterol 250-50 MICROgram(s) Diskus 1 Dose(s) Inhalation two times a day  glucagon  Injectable 1 milliGRAM(s) IntraMuscular once  heparin  Infusion 1500 Unit(s)/Hr (16 mL/Hr) IV Continuous <Continuous>  insulin lispro (ADMELOG) corrective regimen sliding scale   SubCutaneous at bedtime  insulin lispro (ADMELOG) corrective regimen sliding scale   SubCutaneous three times a day before meals  metoprolol tartrate 25 milliGRAM(s) Oral two times a day  pantoprazole  Injectable 40 milliGRAM(s) IV Push daily  piperacillin/tazobactam IVPB.. 3.375 Gram(s) IV Intermittent every 8 hours  polyethylene glycol 3350 17 Gram(s) Oral daily  thiamine 100 milliGRAM(s) Oral daily    MEDICATIONS  (PRN):  albuterol/ipratropium for Nebulization 3 milliLiter(s) Nebulizer every 6 hours PRN Shortness of Breath and/or Wheezing        T(F): 98.3 (10-06-24 @ 04:03), Max: 99.9 (10-05-24 @ 16:53)  HR: 93 (10-06-24 @ 04:03) (82 - 94)  BP: 113/81 (10-06-24 @ 04:03) (105/70 - 121/82)  BP(mean): --  RR: 18 (10-06-24 @ 04:03) (18 - 18)  SpO2: 97% (10-06-24 @ 04:03) (92% - 99%)    PHYSICAL EXAM:     GENERAL: NAD, lying in bed comfortably  EYES: +senile arcuus; EOMI, conjunctiva and sclera clear, no nystagmus noted  CHEST/LUNG: +april rhonchi; No rales, wheezing, or rubs. Unlabored respirations  HEART: Regular rate and rhythm; No murmurs, rubs, or gallops, normal S1/S2  ABDOMEN: normal bowel sounds; Soft, nontender, nondistended, no organomegaly   EXTREMITIES:  2+ Peripheral Pulses, brisk capillary refill. No clubbing, cyanosis, or edema  MSK: No gross deformities noted   Neurological:  A&Ox3, no focal deficits   PSYCH: Normal mood, affect     TELEMETRY:    LABS:                        9.9    11.91 )-----------( 269      ( 06 Oct 2024 05:00 )             31.4     10-06    141  |  101  |  12  ----------------------------<  146[H]  3.8   |  27  |  0.88    Ca    9.2      06 Oct 2024 05:00  Phos  4.4     10-06  Mg     2.2     10-06    TPro  7.3  /  Alb  3.5  /  TBili  0.6  /  DBili  x   /  AST  17  /  ALT  10  /  AlkPhos  109  10-06        PT/INR - ( 04 Oct 2024 23:19 )   PT: 13.6 sec;   INR: 1.20 ratio         PTT - ( 06 Oct 2024 04:59 )  PTT:69.8 sec  Blood Gas Profile w/Lytes - Venous: Performed In Lab (10-06-24 @ 05:00)  Blood Gas Profile w/Lytes - Venous: Performed In Lab (10-06-24 @ 00:54)  Blood Gas Profile w/Lytes - Venous: Performed In Lab (10-05-24 @ 18:19)  Blood Gas Profile w/Lytes - Venous: Performed In Lab (10-05-24 @ 10:38)    Creatinine Trend: 0.88<--, 0.93<--, 0.94<--  I&O's Summary    05 Oct 2024 07:01  -  06 Oct 2024 06:25  --------------------------------------------------------  IN: 0 mL / OUT: 300 mL / NET: -300 mL      BNP  Blood Gas Profile w/Lytes - Venous: Performed In Lab (10-06-24 @ 05:00)  Blood Gas Profile w/Lytes - Venous: Performed In Lab (10-06-24 @ 00:54)  Blood Gas Profile w/Lytes - Venous: Performed In Lab (10-05-24 @ 18:19)  Blood Gas Profile w/Lytes - Venous: Performed In Lab (10-05-24 @ 10:38)    RADIOLOGY & ADDITIONAL STUDIES:

## 2024-10-06 NOTE — PROGRESS NOTE ADULT - ATTENDING COMMENTS
Patient is a 69 year old male, with PMH of HTN, DM2, ?CVA/TIA, Aflutter, CAD s/p CABG (2018), EtOH use disorder (two 40oz beers daily), current smoker (8 cigs/day), L total hip arthroplasty, initially admitted to [facility] (9/14/24-10/4/24) after mech fall c/b R prox humeral fx and EtOH withdrawal. Was on CIWA with Librium/Ativan PRN. TTE on 9/15/24 showed LVEF 60-65%, grad I DD, and an aortic valve prosthesis with normal function. Course c/b AMS, acute hypoxemic/hypercapnic respiratory, and agonal breathing on 9/18/24. Was initially given flumazenil x2 with some improvement, but was intubated for airway protection given excessive secretions and transferred to MICU 9/18/24. CTA chest was somewhat limited study but did not find PE at the time, noted RUL GGOs "suspicious for atypical or viral infection." CTH showed subacute and chronic SDHs as well as chronic lacunar infarct within L lentiform nucleus. Repeat CT showed stable SDHs. Extubated on 9/19/24. Pt was downgraded to Medicine floors on 9/21/24. Noted to have loss of voice and dysphagia. Evaluated by S+S, found dysphagia on video flouroscopy to all textures and recommended for PEG tube, but pt refused and opted for NGT placement. Ortho surgery that was planned for humeral fx was cancelled and ultimately recommended for conservative management only. CXR on 10/3/24 showed new LLL infiltrate, started on Zosyn. On 10/4/24, pt desatted on supplemental O2, CTA chest found massive PE in R main bronchus with pulm trunk dilatation. BLE duplex also found b/l LE DVTs (L>R). Started on hep gtt and transferred to [facility] for embolectomy evaluation.    #Acute hypoxemic respiratory failure  #R main bronchus PE, b/l LE DVTs  #?PNA  #Dysphagia, hypophonia  #R humeral fx  #Chronic aflutter  #DM2  #EtOH/tobacco use disorder    - Vascular Cardiology following  - c/w hep gtt, monitoring anti-Xa q6h for now until therapeutic per Vasc Cards (target 0.4-0.6 given SDH)  - c/w empiric zosyn for now  - c/w amio and metoprolol (was on at OSH)  - c/w advair BID and duonebs q6h PRN  - c/w low ISS for now, monitor FS  - monitor on telemetry  - f/u repeat CTH to confirm stability of SDH; NSGY eval once CT head done to see if ok for DOAC  - f/u MRI head given dysphagia/dysphonia  - TTE noted with no acute findings   - S+S, nutrition, SBIRT eval  - Ortho consult once more stable   - started on NG feeds 10/5 but patient pulled out NGT overnight; difficulty placing back at this time; reattempt tomorrow; change meds to IV as able; monitor FS q6 while NPO; dietitian recs noted   - wean O2 as tolerable   - VA duplex LE pending     Rest as above. Discussed with HS.

## 2024-10-06 NOTE — PROGRESS NOTE ADULT - ASSESSMENT
Mr. Jarrett Overton is a 69-year-old w/ PMH of HTN, T2DM, CVA, A. flutter, CAD s/p CABG x3 (~2018), and alcohol use disorder who initially presented to [facility] on 9/14 for a mechanical fall. On 10/4, patient was found to be hypoxic with subsequent CTA chest showing large PE of R main pulmonary artery. US of b/l LEs was further significant for b/l DVTs in the R peroneal vein and L popliteal, tibial trunk, peroneal, and posterior tibial veins. Patient was transferred to [facility] for further management of PE and embolectomy evaluation.

## 2024-10-06 NOTE — PROGRESS NOTE ADULT - PROBLEM SELECTOR PLAN 4
- Initially presented to [facility] on 9/14 for a mechanical fall after he tripped and fell onto his R shoulder  - XR showing R proximal humerus fracture, ORIF with Orthopedic Surgery at [facility] cancelled due to MICU course  > Consult Orthopedic Surgery when stable

## 2024-10-06 NOTE — PROGRESS NOTE ADULT - ASSESSMENT
69-year-old w/ PMH of CVA (unknown when), CAD s/p CABG and likely SAVR with bioprosthetic valve, Atrial flutter (not on AC, unknown why), presents as a transfer from [place] for intermediate high risk PE, after he initially presented for unwitnessed fall c/b humeral fracture with course c/b AMS and hypoxia thought 2/2 PNA requiring intubation and later complicated by hypoxia and tachycardia following extubation prompting diagnosis of PE. There has been a c/f possible SDH vs intracranial process on imaging at OSH. He remains with AMS (AAOx2), HDS without tachycardia, and remains off O2. He remains asymptomatic from PE at rest, and functional statu is challenging to assess given patients mental status and inability to safely leave bed at this time. This is further complicated by possible PNA for which he is on antibiotics. Given his stability and improved objective markers (HR, labwork), no plan for catheter based intervention of his PE at this time.     #Int. High risk PE - elevated trop and BNP, RH strain on TTE    Recs  - Neurology vs nsgy eval given c/f intracranial process and to discuss safety of AC  - If cleared by above team, continue heparin gtt for AC of PE - will transition to DOAC ultimately   - LE venous dupplex  - Continue home antihypertensives    Please see attending attestation for final recommendations        Jovan Waite MD  Cardiology Fellow     All Cardiology service information can be found 24/7 on [email or web address], password: [employer] 69-year-old w/ PMH of CVA (unknown when), CAD s/p CABG and likely SAVR with bioprosthetic valve, Atrial flutter (not on AC, unknown why), presents as a transfer from [place] for intermediate high risk PE, after he initially presented for unwitnessed fall c/b humeral fracture with course c/b AMS and hypoxia thought 2/2 PNA requiring intubation and later complicated by hypoxia and tachycardia following extubation prompting diagnosis of PE. There has been a c/f possible SDH vs intracranial process on imaging at OSH. He remains with AMS (AAOx2), HDS without tachycardia, and remains off O2. He remains asymptomatic from PE at rest, and functional status is challenging to assess given patients mental status and inability to safely leave bed at this time. This is further complicated by possible PNA for which he is on antibiotics. Given his stability and improved objective markers (HR, labwork), no plan for catheter based intervention of his PE at this time.     #Int. High risk PE - elevated trop and BNP, RH strain on TTE    Recs  - Neurology vs nsgy eval given c/f intracranial process and to discuss safety of AC  - If cleared by above team, continue heparin gtt for AC of PE - will transition to DOAC ultimately   - LE venous dupplex  - Continue home antihypertensives      Jovan Waite MD  Cardiology Fellow     All Cardiology service information can be found 24/7 on [email or web address], password: [employer]

## 2024-10-07 DIAGNOSIS — Z46.59 ENCOUNTER FOR FITTING AND ADJUSTMENT OF OTHER GASTROINTESTINAL APPLIANCE AND DEVICE: ICD-10-CM

## 2024-10-07 LAB
ALBUMIN SERPL ELPH-MCNC: 3.4 G/DL — SIGNIFICANT CHANGE UP (ref 3.3–5)
ALP SERPL-CCNC: 104 U/L — SIGNIFICANT CHANGE UP (ref 40–120)
ALT FLD-CCNC: 8 U/L — LOW (ref 10–45)
ANION GAP SERPL CALC-SCNC: 18 MMOL/L — HIGH (ref 5–17)
APTT BLD: 57.8 SEC — HIGH (ref 24.5–35.6)
APTT BLD: 85.9 SEC — HIGH (ref 24.5–35.6)
AST SERPL-CCNC: 13 U/L — SIGNIFICANT CHANGE UP (ref 10–40)
BASOPHILS # BLD AUTO: 0.05 K/UL — SIGNIFICANT CHANGE UP (ref 0–0.2)
BASOPHILS NFR BLD AUTO: 0.6 % — SIGNIFICANT CHANGE UP (ref 0–2)
BILIRUB SERPL-MCNC: 0.6 MG/DL — SIGNIFICANT CHANGE UP (ref 0.2–1.2)
BUN SERPL-MCNC: 7 MG/DL — SIGNIFICANT CHANGE UP (ref 7–23)
CALCIUM SERPL-MCNC: 8.8 MG/DL — SIGNIFICANT CHANGE UP (ref 8.4–10.5)
CHLORIDE SERPL-SCNC: 92 MMOL/L — LOW (ref 96–108)
CO2 SERPL-SCNC: 26 MMOL/L — SIGNIFICANT CHANGE UP (ref 22–31)
CREAT SERPL-MCNC: 0.73 MG/DL — SIGNIFICANT CHANGE UP (ref 0.5–1.3)
EGFR: 100 ML/MIN/1.73M2 — SIGNIFICANT CHANGE UP
EOSINOPHIL # BLD AUTO: 0.16 K/UL — SIGNIFICANT CHANGE UP (ref 0–0.5)
EOSINOPHIL NFR BLD AUTO: 2 % — SIGNIFICANT CHANGE UP (ref 0–6)
GLUCOSE BLDC GLUCOMTR-MCNC: 112 MG/DL — HIGH (ref 70–99)
GLUCOSE BLDC GLUCOMTR-MCNC: 116 MG/DL — HIGH (ref 70–99)
GLUCOSE BLDC GLUCOMTR-MCNC: 128 MG/DL — HIGH (ref 70–99)
GLUCOSE BLDC GLUCOMTR-MCNC: 130 MG/DL — HIGH (ref 70–99)
GLUCOSE BLDC GLUCOMTR-MCNC: 144 MG/DL — HIGH (ref 70–99)
GLUCOSE SERPL-MCNC: 407 MG/DL — HIGH (ref 70–99)
HCT VFR BLD CALC: 33.6 % — LOW (ref 39–50)
HGB BLD-MCNC: 10.4 G/DL — LOW (ref 13–17)
IMM GRANULOCYTES NFR BLD AUTO: 0.6 % — SIGNIFICANT CHANGE UP (ref 0–0.9)
LYMPHOCYTES # BLD AUTO: 1.29 K/UL — SIGNIFICANT CHANGE UP (ref 1–3.3)
LYMPHOCYTES # BLD AUTO: 15.9 % — SIGNIFICANT CHANGE UP (ref 13–44)
MAGNESIUM SERPL-MCNC: 2.1 MG/DL — SIGNIFICANT CHANGE UP (ref 1.6–2.6)
MCHC RBC-ENTMCNC: 29.5 PG — SIGNIFICANT CHANGE UP (ref 27–34)
MCHC RBC-ENTMCNC: 31 GM/DL — LOW (ref 32–36)
MCV RBC AUTO: 95.5 FL — SIGNIFICANT CHANGE UP (ref 80–100)
MONOCYTES # BLD AUTO: 0.8 K/UL — SIGNIFICANT CHANGE UP (ref 0–0.9)
MONOCYTES NFR BLD AUTO: 9.9 % — SIGNIFICANT CHANGE UP (ref 2–14)
NEUTROPHILS # BLD AUTO: 5.74 K/UL — SIGNIFICANT CHANGE UP (ref 1.8–7.4)
NEUTROPHILS NFR BLD AUTO: 71 % — SIGNIFICANT CHANGE UP (ref 43–77)
NRBC # BLD: 0 /100 WBCS — SIGNIFICANT CHANGE UP (ref 0–0)
PHOSPHATE SERPL-MCNC: 4.1 MG/DL — SIGNIFICANT CHANGE UP (ref 2.5–4.5)
PLATELET # BLD AUTO: 271 K/UL — SIGNIFICANT CHANGE UP (ref 150–400)
POTASSIUM SERPL-MCNC: 3.6 MMOL/L — SIGNIFICANT CHANGE UP (ref 3.5–5.3)
POTASSIUM SERPL-SCNC: 3.6 MMOL/L — SIGNIFICANT CHANGE UP (ref 3.5–5.3)
PROT SERPL-MCNC: 7.3 G/DL — SIGNIFICANT CHANGE UP (ref 6–8.3)
RBC # BLD: 3.52 M/UL — LOW (ref 4.2–5.8)
RBC # FLD: 13.4 % — SIGNIFICANT CHANGE UP (ref 10.3–14.5)
SODIUM SERPL-SCNC: 136 MMOL/L — SIGNIFICANT CHANGE UP (ref 135–145)
UFH PPP CHRO-ACNC: 0.34 IU/ML — SIGNIFICANT CHANGE UP (ref 0.3–0.7)
WBC # BLD: 8.09 K/UL — SIGNIFICANT CHANGE UP (ref 3.8–10.5)
WBC # FLD AUTO: 8.09 K/UL — SIGNIFICANT CHANGE UP (ref 3.8–10.5)

## 2024-10-07 PROCEDURE — 99232 SBSQ HOSP IP/OBS MODERATE 35: CPT

## 2024-10-07 PROCEDURE — 70450 CT HEAD/BRAIN W/O DYE: CPT | Mod: 26

## 2024-10-07 PROCEDURE — 71045 X-RAY EXAM CHEST 1 VIEW: CPT | Mod: 26

## 2024-10-07 PROCEDURE — 99232 SBSQ HOSP IP/OBS MODERATE 35: CPT | Mod: GC

## 2024-10-07 PROCEDURE — 93970 EXTREMITY STUDY: CPT | Mod: 26

## 2024-10-07 RX ORDER — ACETAMINOPHEN 500 MG
1000 TABLET ORAL ONCE
Refills: 0 | Status: COMPLETED | OUTPATIENT
Start: 2024-10-07 | End: 2024-10-07

## 2024-10-07 RX ADMIN — Medication 5 MILLIGRAM(S): at 00:12

## 2024-10-07 RX ADMIN — HEPARIN SODIUM 16 UNIT(S)/HR: 10000 INJECTION INTRAVENOUS; SUBCUTANEOUS at 08:14

## 2024-10-07 RX ADMIN — PIPERACILLIN AND TAZOBACTAM 25 GRAM(S): .5; 4 INJECTION, POWDER, LYOPHILIZED, FOR SOLUTION INTRAVENOUS at 05:06

## 2024-10-07 RX ADMIN — Medication 1000 MILLIGRAM(S): at 14:17

## 2024-10-07 RX ADMIN — HEPARIN SODIUM 16 UNIT(S)/HR: 10000 INJECTION INTRAVENOUS; SUBCUTANEOUS at 17:44

## 2024-10-07 RX ADMIN — Medication 400 MILLIGRAM(S): at 13:47

## 2024-10-07 RX ADMIN — Medication 5 MILLIGRAM(S): at 17:48

## 2024-10-07 RX ADMIN — PANTOPRAZOLE SODIUM 40 MILLIGRAM(S): 40 TABLET, DELAYED RELEASE ORAL at 11:49

## 2024-10-07 RX ADMIN — FLUTICASONE PROPIONATE AND SALMETEROL XINAFOATE 1 DOSE(S): 230; 21 AEROSOL, METERED RESPIRATORY (INHALATION) at 05:07

## 2024-10-07 RX ADMIN — Medication 5 MILLIGRAM(S): at 05:06

## 2024-10-07 RX ADMIN — Medication 5 MILLIGRAM(S): at 11:49

## 2024-10-07 RX ADMIN — SODIUM CHLORIDE 4 MILLILITER(S): 9 INJECTION, SOLUTION INTRAMUSCULAR; INTRAVENOUS; SUBCUTANEOUS at 06:13

## 2024-10-07 RX ADMIN — FLUTICASONE PROPIONATE AND SALMETEROL XINAFOATE 1 DOSE(S): 230; 21 AEROSOL, METERED RESPIRATORY (INHALATION) at 17:48

## 2024-10-07 RX ADMIN — PIPERACILLIN AND TAZOBACTAM 25 GRAM(S): .5; 4 INJECTION, POWDER, LYOPHILIZED, FOR SOLUTION INTRAVENOUS at 21:40

## 2024-10-07 RX ADMIN — PIPERACILLIN AND TAZOBACTAM 25 GRAM(S): .5; 4 INJECTION, POWDER, LYOPHILIZED, FOR SOLUTION INTRAVENOUS at 13:48

## 2024-10-07 RX ADMIN — SODIUM CHLORIDE 4 MILLILITER(S): 9 INJECTION, SOLUTION INTRAMUSCULAR; INTRAVENOUS; SUBCUTANEOUS at 17:47

## 2024-10-07 RX ADMIN — HEPARIN SODIUM 15 UNIT(S)/HR: 10000 INJECTION INTRAVENOUS; SUBCUTANEOUS at 23:41

## 2024-10-07 NOTE — PROGRESS NOTE ADULT - ATTENDING COMMENTS
Patient is a 69 year old male, with PMH of HTN, DM2, ?CVA/TIA, Aflutter, CAD s/p CABG (2018), EtOH use disorder (two 40oz beers daily), current smoker (8 cigs/day), L total hip arthroplasty, initially admitted to [facility] (9/14/24-10/4/24) after mech fall c/b R prox humeral fx and EtOH withdrawal. Was on CIWA with Librium/Ativan PRN. TTE on 9/15/24 showed LVEF 60-65%, grad I DD, and an aortic valve prosthesis with normal function. Course c/b AMS, acute hypoxemic/hypercapnic respiratory, and agonal breathing on 9/18/24. Was initially given flumazenil x2 with some improvement, but was intubated for airway protection given excessive secretions and transferred to MICU 9/18/24. CTA chest was somewhat limited study but did not find PE at the time, noted RUL GGOs "suspicious for atypical or viral infection." CTH showed subacute and chronic SDHs as well as chronic lacunar infarct within L lentiform nucleus. Repeat CT showed stable SDHs. Extubated on 9/19/24. Pt was downgraded to Medicine floors on 9/21/24. Noted to have loss of voice and dysphagia. Evaluated by S+S, found dysphagia on video flouroscopy to all textures and recommended for PEG tube, but pt refused and opted for NGT placement. Ortho surgery that was planned for humeral fx was cancelled and ultimately recommended for conservative management only. CXR on 10/3/24 showed new LLL infiltrate, started on Zosyn. On 10/4/24, pt desatted on supplemental O2, CTA chest found massive PE in R main bronchus with pulm trunk dilatation. BLE duplex also found b/l LE DVTs (L>R). Started on hep gtt and transferred to [facility] for embolectomy evaluation.    #Acute hypoxemic respiratory failure  #R main bronchus PE, b/l LE DVTs  #?PNA  #Dysphagia, hypophonia  #R humeral fx  #Chronic aflutter  #DM2  #EtOH/tobacco use disorder    - Vascular Cardiology following  - c/w hep gtt, monitoring anti-Xa q6h for now until therapeutic per Vasc Cards (target 0.4-0.6 given SDH)  - c/w empiric zosyn for now; likely continue for 7 day course   - c/w amio and metoprolol (was on at OSH)  - c/w advair BID and duonebs q6h PRN  - c/w low ISS for now, monitor FS  - monitor on telemetry  - f/u repeat CTH to confirm stability of SDH; NSGY eval once CT head done to see if ok for DOAC  - f/u MRI head given dysphagia/dysphonia  - TTE noted with no acute findings   - S+S eval, nutrition, SBIRT eval  - Ortho consult for humerus fracture    - started on NG feeds 10/5 but patient pulled out NGT 10/6 overnight; difficulty placing back at this time; reattempt today but also obtain S/S eval; change meds to IV as able; monitor FS q6 while NPO; dietitian recs noted   - wean O2 as tolerable; currently on 4L NC   - VA duplex LE pending     Rest as above. Discussed with HS.

## 2024-10-07 NOTE — PROGRESS NOTE ADULT - PROBLEM SELECTOR PLAN 2
- Hospital course at [facility] c/b AHRF  - S/p intubation on 9/18 for likely benzodiazepine overdose, extubated on 9/19  - Diffuse rhonchi with copious secretions on exam  - CTA chest (10/4) -> Large PE of R main pulmonary artery, scattered GGOs and interstitial changes predominantly in R and L lower lobes  - Likely PE +/- superimposed aspiration PNA  > C/w management of PE, as above  > C/w empiric Zosyn  > C/w oxygen supplementation prn

## 2024-10-07 NOTE — PROGRESS NOTE ADULT - PROBLEM SELECTOR PLAN 1
- Hospital course at [facility] c/b AHRF  - CTA chest (10/4) -> Large PE of R main pulmonary artery  - US of b/l LEs -> R peroneal vein and L popliteal, tibial trunk, peroneal, and posterior tibial DVTs  - EKG showing evidence of right heart strain, S1Q3T3 changes, T wave depressions in precordial leads; not present on EKG from 9/15  - TTE (9/15) unremarkable  - Troponin 92, BNP 7168 on transfer  > Pending CTH  > C/w heparin gtt w/ target PTT-> 0.4-0.6 Repeat TTE ordered  > Vascular cardiology consulted rec heparin for now and eventual transition to DOAC  - Neurosurg consulted for AC management, they want CT head - Hospital course at [facility] c/b AHRF  - CTA chest (10/4) -> Large PE of R main pulmonary artery  - US of b/l LEs -> R peroneal vein and L popliteal, tibial trunk, peroneal, and posterior tibial DVTs  - EKG showing evidence of right heart strain, S1Q3T3 changes, T wave depressions in precordial leads; not present on EKG from 9/15  - TTE (9/15) unremarkable  - Troponin 92, BNP 7168 on transfer  > Pending CTH  > C/w heparin gtt w/ target PTT-> 0.4-0.6 Repeat TTE not concerning  > Vascular cardiology consulted rec heparin for now and eventual transition to DOAC  - Neurosurg consulted for AC management, they want CT head

## 2024-10-07 NOTE — PROGRESS NOTE ADULT - PROBLEM SELECTOR PLAN 4
- Initially presented to [facility] on 9/14 for a mechanical fall after he tripped and fell onto his R shoulder  - XR showing R proximal humerus fracture, ORIF with Orthopedic Surgery at [facility] cancelled due to MICU course  > Consult Orthopedic Surgery when stable - Initially presented to [facility] on 9/14 for a mechanical fall after he tripped and fell onto his R shoulder  - XR showing R proximal humerus fracture, ORIF with Orthopedic Surgery at [facility] cancelled due to MICU course  > Orthopedic surgery on 10/7 said do outpt surgery.

## 2024-10-07 NOTE — PROGRESS NOTE ADULT - PROBLEM SELECTOR PLAN 8
- Significant dysphagia s/p extubation on 9/19  - Patient declined PEG tube, NG tube placed instead  > Repeat S&S eval prn  > MRI brain ordered - Significant dysphagia s/p extubation on 9/19  - Patient declined PEG tube, NG tube placed instead, pt pulled out, will attempt replacement   > S&S eval ordered  > MRI brain ordered (MRI needed more imaging of CXR (done) and brain imaging (ordered) before MRI)

## 2024-10-07 NOTE — PROGRESS NOTE ADULT - PROBLEM SELECTOR PLAN 3
- US of b/l LEs at [facility] -> R peroneal vein and L popliteal, tibial trunk, peroneal, and posterior tibial DVTs  > Repeat US of b/l LEs ordered  > C/w heparin gtt., as above

## 2024-10-07 NOTE — PHYSICAL THERAPY INITIAL EVALUATION ADULT - PERTINENT HX OF CURRENT PROBLEM, REHAB EVAL
Pt is a 69 year old male with PMH significant for HTN, T2DM, CVA, A. flutter, CAD s/p CABG x3 (~2018), and alcohol use disorder.  Pt initially presented to [facility] on 9/14 for a mechanical fall after he tripped and fell onto his R shoulder, denied any headstrike or LOC, subsequent XR showing R proximal humerus fracture with planned ORIF with Orthopedic Surgery. Course was c/b alcohol withdrawal and was started on CIWA protocol. On 9/18, RRT was called for AMS and patient was found to be in acute hypercapnic respiratory failure iso prior Librium dose. Patient was given flumazenil and then intubated and transferred to the MICU. ORIF of R humerus was cancelled and CT head showed signs of subacute and chronic subdural hematomas. Patient was extubated on 9/19 and repeat CT head showed stable hematomas. Following extubation, patient suffered from significant dysphagia but patient declined PEG tube offered by GI and instead NG tube was placed. On 10/4, patient was found to be hypoxic with subsequent CTA chest showing large PE of R main pulmonary artery. US of b/l LEs was further significant for b/l DVTs in the R peroneal vein and L popliteal, tibial trunk, peroneal, and posterior tibial veins. Patient was transferred to [facility] for further management of PE and embolectomy evaluation.  CXR(10/5): Postsurgical changes. No focal consolidation.  CXR(10/6): Bibasilar hazy opacities, left greater than right may represent atelectasis versus pneumonia.

## 2024-10-07 NOTE — PHYSICAL THERAPY INITIAL EVALUATION ADULT - ADDITIONAL COMMENTS
Pt lives in an apartment alone with 5 ADE and 8 steps to living space.  PTA pt was having difficulty performing stairs.  Pt ambulates independently using cane or rolling walker.  Pt also owns a shower chair.

## 2024-10-07 NOTE — PROGRESS NOTE ADULT - ASSESSMENT
Mr. Jarrett Overton is a 69-year-old w/ PMH of HTN, T2DM, CVA, A. flutter, CAD s/p CABG x3 (~2018), and alcohol use disorder who initially presented to [facility] on 9/14 for a mechanical fall. On 10/4, patient was found to be hypoxic with subsequent CTA chest showing large PE of R main pulmonary artery. US of b/l LEs was further significant for b/l DVTs in the R peroneal vein and L popliteal, tibial trunk, peroneal, and posterior tibial veins. Patient was transferred to [facility] for further management of PE and embolectomy evaluation.

## 2024-10-07 NOTE — PROGRESS NOTE ADULT - SUBJECTIVE AND OBJECTIVE BOX
Patient seen and examined at bedside.    Overnight Events:   EVANGELINA  Still confused but improved from yesterday  On 3L NC - unclear if needed    REVIEW OF SYSTEMS:  All other review of systems is negative unless indicated above.            Current Meds:  albuterol/ipratropium for Nebulization 3 milliLiter(s) Nebulizer every 6 hours PRN  aMIOdarone    Tablet 200 milliGRAM(s) Oral daily  amLODIPine   Tablet 10 milliGRAM(s) Oral daily  dextrose 5%. 1000 milliLiter(s) IV Continuous <Continuous>  dextrose 5%. 1000 milliLiter(s) IV Continuous <Continuous>  dextrose 50% Injectable 25 Gram(s) IV Push once  dextrose 50% Injectable 12.5 Gram(s) IV Push once  dextrose 50% Injectable 25 Gram(s) IV Push once  dextrose Oral Gel 15 Gram(s) Oral once  fluticasone propionate/ salmeterol 250-50 MICROgram(s) Diskus 1 Dose(s) Inhalation two times a day  glucagon  Injectable 1 milliGRAM(s) IntraMuscular once  heparin  Infusion 1500 Unit(s)/Hr IV Continuous <Continuous>  insulin lispro (ADMELOG) corrective regimen sliding scale   SubCutaneous at bedtime  insulin lispro (ADMELOG) corrective regimen sliding scale   SubCutaneous three times a day before meals  metoprolol tartrate Injectable 5 milliGRAM(s) IV Push every 6 hours  pantoprazole  Injectable 40 milliGRAM(s) IV Push daily  piperacillin/tazobactam IVPB.. 3.375 Gram(s) IV Intermittent every 8 hours  polyethylene glycol 3350 17 Gram(s) Oral daily  sodium chloride 3%  Inhalation 4 milliLiter(s) Inhalation every 12 hours  thiamine 100 milliGRAM(s) Oral daily      Vitals:  T(F): 98.7 (10-07), Max: 98.7 (10-06)  HR: 80 (10-07) (80 - 92)  BP: 122/83 (10-07) (111/75 - 122/83)  RR: 18 (10-07)  SpO2: 92% (10-07)  I&O's Summary    06 Oct 2024 07:01  -  07 Oct 2024 07:00  --------------------------------------------------------  IN: 352 mL / OUT: 500 mL / NET: -148 mL      Physical Exam:  Appearance: No acute distress;  Eyes:  EOMI  HEENT: Normal oral mucosa  Cardiovascular: RRR, S1, S2, +systolic murmur  Respiratory: upper airway sounds  Gastrointestinal: soft, non-tender, non-distended  Neurologic: Non-focal  Psychiatry: AAOx2                          10.4   8.09  )-----------( 271      ( 07 Oct 2024 07:25 )             33.6     10-07    136  |  92[L]  |  7   ----------------------------<  407[H]  3.6   |  26  |  0.73    Ca    8.8      07 Oct 2024 07:30  Phos  4.1     10-07  Mg     2.1     10-07    TPro  7.3  /  Alb  3.4  /  TBili  0.6  /  DBili  x   /  AST  13  /  ALT  8[L]  /  AlkPhos  104  10-07    PTT - ( 07 Oct 2024 07:25 )  PTT:57.8 sec

## 2024-10-07 NOTE — CONSULT NOTE ADULT - PROBLEM SELECTOR RECOMMENDATION 9
- Please obtain Chest xray for confirmation.   - Please flush NGT with extra saline after giving crushed medications.   - The bridle slightly decreases the lumen diameter of NGT. Extra flush of saline when administering feeds/medication will help prevent clogging of NGT.

## 2024-10-07 NOTE — PROGRESS NOTE ADULT - SUBJECTIVE AND OBJECTIVE BOX
PROGRESS NOTE:   Authored by: Stephanie Pedroza MD  PGY-1    Patient is a 69y old  Male who presents with a chief complaint of Pulmonary Embolism (06 Oct 2024 09:19)      SUBJECTIVE / OVERNIGHT EVENTS:  No acute events overnight. Pt doing well this morning.    MEDICATIONS  (STANDING):  aMIOdarone    Tablet 200 milliGRAM(s) Oral daily  amLODIPine   Tablet 10 milliGRAM(s) Oral daily  dextrose 5%. 1000 milliLiter(s) (50 mL/Hr) IV Continuous <Continuous>  dextrose 5%. 1000 milliLiter(s) (100 mL/Hr) IV Continuous <Continuous>  dextrose 50% Injectable 12.5 Gram(s) IV Push once  dextrose 50% Injectable 25 Gram(s) IV Push once  dextrose 50% Injectable 25 Gram(s) IV Push once  dextrose Oral Gel 15 Gram(s) Oral once  fluticasone propionate/ salmeterol 250-50 MICROgram(s) Diskus 1 Dose(s) Inhalation two times a day  glucagon  Injectable 1 milliGRAM(s) IntraMuscular once  heparin  Infusion 1500 Unit(s)/Hr (16 mL/Hr) IV Continuous <Continuous>  insulin lispro (ADMELOG) corrective regimen sliding scale   SubCutaneous at bedtime  insulin lispro (ADMELOG) corrective regimen sliding scale   SubCutaneous three times a day before meals  metoprolol tartrate Injectable 5 milliGRAM(s) IV Push every 6 hours  pantoprazole  Injectable 40 milliGRAM(s) IV Push daily  piperacillin/tazobactam IVPB.. 3.375 Gram(s) IV Intermittent every 8 hours  polyethylene glycol 3350 17 Gram(s) Oral daily  sodium chloride 3%  Inhalation 4 milliLiter(s) Inhalation every 12 hours  thiamine 100 milliGRAM(s) Oral daily    MEDICATIONS  (PRN):  albuterol/ipratropium for Nebulization 3 milliLiter(s) Nebulizer every 6 hours PRN Shortness of Breath and/or Wheezing      CAPILLARY BLOOD GLUCOSE      POCT Blood Glucose.: 130 mg/dL (07 Oct 2024 06:38)  POCT Blood Glucose.: 161 mg/dL (06 Oct 2024 23:55)  POCT Blood Glucose.: 126 mg/dL (06 Oct 2024 21:22)  POCT Blood Glucose.: 143 mg/dL (06 Oct 2024 17:46)  POCT Blood Glucose.: 143 mg/dL (06 Oct 2024 11:48)    I&O's Summary    06 Oct 2024 07:01  -  07 Oct 2024 07:00  --------------------------------------------------------  IN: 352 mL / OUT: 500 mL / NET: -148 mL        PHYSICAL EXAM:  Vital Signs Last 24 Hrs  T(C): 36.7 (07 Oct 2024 04:10), Max: 37.1 (06 Oct 2024 20:09)  T(F): 98 (07 Oct 2024 04:10), Max: 98.7 (06 Oct 2024 20:09)  HR: 81 (07 Oct 2024 04:10) (81 - 92)  BP: 121/77 (07 Oct 2024 04:10) (111/75 - 127/85)  BP(mean): --  RR: 18 (07 Oct 2024 04:10) (18 - 18)  SpO2: 94% (07 Oct 2024 04:10) (91% - 95%)    Parameters below as of 07 Oct 2024 04:10  Patient On (Oxygen Delivery Method): nasal cannula  O2 Flow (L/min): 4      CONSTITUTIONAL: NAD, well-developed  RESPIRATORY: Normal respiratory effort; lungs are clear to auscultation bilaterally  CARDIOVASCULAR: Regular rate and rhythm, normal S1 and S2, no murmur/rub/gallop; Peripheral pulses are 2+ bilaterally  ABDOMEN: Nontender to palpation, normoactive bowel sounds, no rebound/guarding  MUSCLOSKELETAL:  Moving all limbs spontaneously; No lower extremity edema  PSYCH: Affect appropriate    LABS:                        10.4   8.09  )-----------( 271      ( 07 Oct 2024 07:25 )             33.6     10-07    136  |  92[L]  |  7   ----------------------------<  407[H]  3.6   |  26  |  0.73    Ca    8.8      07 Oct 2024 07:30  Phos  4.1     10-07  Mg     2.1     10-07    TPro  7.3  /  Alb  3.4  /  TBili  0.6  /  DBili  x   /  AST  13  /  ALT  8[L]  /  AlkPhos  104  10-07    PTT - ( 07 Oct 2024 07:25 )  PTT:57.8 sec        MICRO:  Urinalysis Basic - ( 07 Oct 2024 07:30 )    Color: x / Appearance: x / SG: x / pH: x  Gluc: 407 mg/dL / Ketone: x  / Bili: x / Urobili: x   Blood: x / Protein: x / Nitrite: x   Leuk Esterase: x / RBC: x / WBC x   Sq Epi: x / Non Sq Epi: x / Bacteria: x          IMAGING: PROGRESS NOTE:   Authored by: Stephanie Pedroza MD  PGY-1    Patient is a 69y old  Male who presents with a chief complaint of Pulmonary Embolism (06 Oct 2024 09:19)    SUBJECTIVE / OVERNIGHT EVENTS:  Pt continuing to need restraints. NG tube pulled out by pt.    MEDICATIONS  (STANDING):  aMIOdarone    Tablet 200 milliGRAM(s) Oral daily  amLODIPine   Tablet 10 milliGRAM(s) Oral daily  dextrose 5%. 1000 milliLiter(s) (50 mL/Hr) IV Continuous <Continuous>  dextrose 5%. 1000 milliLiter(s) (100 mL/Hr) IV Continuous <Continuous>  dextrose 50% Injectable 12.5 Gram(s) IV Push once  dextrose 50% Injectable 25 Gram(s) IV Push once  dextrose 50% Injectable 25 Gram(s) IV Push once  dextrose Oral Gel 15 Gram(s) Oral once  fluticasone propionate/ salmeterol 250-50 MICROgram(s) Diskus 1 Dose(s) Inhalation two times a day  glucagon  Injectable 1 milliGRAM(s) IntraMuscular once  heparin  Infusion 1500 Unit(s)/Hr (16 mL/Hr) IV Continuous <Continuous>  insulin lispro (ADMELOG) corrective regimen sliding scale   SubCutaneous at bedtime  insulin lispro (ADMELOG) corrective regimen sliding scale   SubCutaneous three times a day before meals  metoprolol tartrate Injectable 5 milliGRAM(s) IV Push every 6 hours  pantoprazole  Injectable 40 milliGRAM(s) IV Push daily  piperacillin/tazobactam IVPB.. 3.375 Gram(s) IV Intermittent every 8 hours  polyethylene glycol 3350 17 Gram(s) Oral daily  sodium chloride 3%  Inhalation 4 milliLiter(s) Inhalation every 12 hours  thiamine 100 milliGRAM(s) Oral daily    MEDICATIONS  (PRN):  albuterol/ipratropium for Nebulization 3 milliLiter(s) Nebulizer every 6 hours PRN Shortness of Breath and/or Wheezing      CAPILLARY BLOOD GLUCOSE      POCT Blood Glucose.: 130 mg/dL (07 Oct 2024 06:38)  POCT Blood Glucose.: 161 mg/dL (06 Oct 2024 23:55)  POCT Blood Glucose.: 126 mg/dL (06 Oct 2024 21:22)  POCT Blood Glucose.: 143 mg/dL (06 Oct 2024 17:46)  POCT Blood Glucose.: 143 mg/dL (06 Oct 2024 11:48)    I&O's Summary    06 Oct 2024 07:01  -  07 Oct 2024 07:00  --------------------------------------------------------  IN: 352 mL / OUT: 500 mL / NET: -148 mL        PHYSICAL EXAM:  Vital Signs Last 24 Hrs  T(C): 36.7 (07 Oct 2024 04:10), Max: 37.1 (06 Oct 2024 20:09)  T(F): 98 (07 Oct 2024 04:10), Max: 98.7 (06 Oct 2024 20:09)  HR: 81 (07 Oct 2024 04:10) (81 - 92)  BP: 121/77 (07 Oct 2024 04:10) (111/75 - 127/85)  BP(mean): --  RR: 18 (07 Oct 2024 04:10) (18 - 18)  SpO2: 94% (07 Oct 2024 04:10) (91% - 95%)    Parameters below as of 07 Oct 2024 04:10  Patient On (Oxygen Delivery Method): nasal cannula  O2 Flow (L/min): 4    CONSTITUTIONAL: AOx2, but confused  RESPIRATORY: Normal respiratory effort; bilat rhonchi  CARDIOVASCULAR: Regular rate and rhythm, normal S1 and S2, no murmur/rub/gallop; Peripheral pulses are 2+ bilaterally  ABDOMEN: Nontender to palpation, normoactive bowel sounds, no rebound/guarding  MUSCLOSKELETAL:  Moving all limbs spontaneously; No lower extremity edema    LABS:                        10.4   8.09  )-----------( 271      ( 07 Oct 2024 07:25 )             33.6     10-07    136  |  92[L]  |  7   ----------------------------<  407[H]  3.6   |  26  |  0.73    Ca    8.8      07 Oct 2024 07:30  Phos  4.1     10-07  Mg     2.1     10-07    TPro  7.3  /  Alb  3.4  /  TBili  0.6  /  DBili  x   /  AST  13  /  ALT  8[L]  /  AlkPhos  104  10-07    PTT - ( 07 Oct 2024 07:25 )  PTT:57.8 sec        MICRO:  Urinalysis Basic - ( 07 Oct 2024 07:30 )    Color: x / Appearance: x / SG: x / pH: x  Gluc: 407 mg/dL / Ketone: x  / Bili: x / Urobili: x   Blood: x / Protein: x / Nitrite: x   Leuk Esterase: x / RBC: x / WBC x   Sq Epi: x / Non Sq Epi: x / Bacteria: x          IMAGING:< from: Xray Chest 1 View AP/PA (10.06.24 @ 19:48) >  IMPRESSION:  Bibasilar hazy opacities, left greater than right may represent   atelectasis versus pneumonia.  .    < end of copied text >

## 2024-10-07 NOTE — PHYSICAL THERAPY INITIAL EVALUATION ADULT - ACTIVE RANGE OF MOTION EXAMINATION, REHAB EVAL
Right UE NT, NWB in sling/Left UE Active ROM was WFL (within functional limits)/bilateral  lower extremity Active ROM was WFL (within functional limits)

## 2024-10-07 NOTE — CONSULT NOTE ADULT - SUBJECTIVE AND OBJECTIVE BOX
CC: NGT placement    HPI: 70 y/o Male with PMHx of HTN, T2DM, CVA, A. flutter, CAD s/p CABG x3 (~2018), and alcohol use disorder who initially presented to [facility] on 9/14 for a mechanical fall after he tripped and fell onto his R shoulder, subsequent XR showing R proximal humerus fracture with planned ORIF with Orthopedic Surgery. Course was c/b alcohol withdrawal and was started on CIWA protocol. On 9/18, RRT was called for AMS and patient was found to be in acute hypercapnic respiratory failure iso prior Librium dose. Patient was given flumazenil and then intubated and transferred to the MICU. ORIF of R humerus was cancelled and CT head showed signs of subacute and chronic subdural hematomas. Patient was extubated on 9/19 and repeat CT head showed stable hematomas. Following extubation, patient suffered from significant dysphagia but patient declined PEG tube offered by GI and instead NG tube was placed. On 10/4, patient was found to be hypoxic with subsequent CTA chest showing large PE of R main pulmonary artery. US of b/l LEs was further significant for b/l DVTs in the R peroneal vein and L popliteal, tibial trunk, peroneal, and posterior tibial veins. Patient was transferred to [facility] for further management of PE and embolectomy evaluation. ENT consulted for NGT placement.               PAST MEDICAL & SURGICAL HISTORY:  HTN (hypertension)      T2DM (type 2 diabetes mellitus)      CVA (cerebrovascular accident)      Atrial flutter      CAD (coronary artery disease)      History of alcohol use disorder      History of repair of left hip joint        Allergies    No Known Allergies    Intolerances      MEDICATIONS  (STANDING):  aMIOdarone    Tablet 200 milliGRAM(s) Oral daily  amLODIPine   Tablet 10 milliGRAM(s) Oral daily  dextrose 5%. 1000 milliLiter(s) (100 mL/Hr) IV Continuous <Continuous>  dextrose 5%. 1000 milliLiter(s) (50 mL/Hr) IV Continuous <Continuous>  dextrose 50% Injectable 12.5 Gram(s) IV Push once  dextrose 50% Injectable 25 Gram(s) IV Push once  dextrose 50% Injectable 25 Gram(s) IV Push once  dextrose Oral Gel 15 Gram(s) Oral once  fluticasone propionate/ salmeterol 250-50 MICROgram(s) Diskus 1 Dose(s) Inhalation two times a day  glucagon  Injectable 1 milliGRAM(s) IntraMuscular once  heparin  Infusion 1500 Unit(s)/Hr (16 mL/Hr) IV Continuous <Continuous>  insulin lispro (ADMELOG) corrective regimen sliding scale   SubCutaneous at bedtime  insulin lispro (ADMELOG) corrective regimen sliding scale   SubCutaneous three times a day before meals  metoprolol tartrate Injectable 5 milliGRAM(s) IV Push every 6 hours  pantoprazole  Injectable 40 milliGRAM(s) IV Push daily  piperacillin/tazobactam IVPB.. 3.375 Gram(s) IV Intermittent every 8 hours  polyethylene glycol 3350 17 Gram(s) Oral daily  sodium chloride 3%  Inhalation 4 milliLiter(s) Inhalation every 12 hours  thiamine 100 milliGRAM(s) Oral daily    MEDICATIONS  (PRN):  albuterol/ipratropium for Nebulization 3 milliLiter(s) Nebulizer every 6 hours PRN Shortness of Breath and/or Wheezing      Social History: No pertinent SHx    Family history: No pertinent FHx    ROS:   ENT: all negative except as noted in HPI   CV: denies palpitations  Pulm: denies SOB, cough, hemoptysis  GI: denies change in appetite, indigestion, n/v  : denies pertinent urinary symptoms, urgency  Neuro: denies numbness/tingling, loss of sensation  Psych: denies anxiety  MS: denies muscle weakness, instability  Heme: denies easy bruising or bleeding  Endo: denies heat/cold intolerance, excessive sweating  Vascular: denies LE edema    Vital Signs Last 24 Hrs  T(C): 37.1 (07 Oct 2024 10:49), Max: 37.1 (06 Oct 2024 20:09)  T(F): 98.7 (07 Oct 2024 10:49), Max: 98.7 (06 Oct 2024 20:09)  HR: 73 (07 Oct 2024 17:52) (73 - 92)  BP: 117/77 (07 Oct 2024 17:52) (115/75 - 122/83)  BP(mean): --  RR: 18 (07 Oct 2024 17:52) (18 - 18)  SpO2: 100% (07 Oct 2024 17:52) (91% - 100%)    Parameters below as of 07 Oct 2024 17:52  Patient On (Oxygen Delivery Method): nasal cannula  O2 Flow (L/min): 4                            10.4   8.09  )-----------( 271      ( 07 Oct 2024 07:25 )             33.6    10-07    136  |  92[L]  |  7   ----------------------------<  407[H]  3.6   |  26  |  0.73    Ca    8.8      07 Oct 2024 07:30  Phos  4.1     10-07  Mg     2.1     10-07    TPro  7.3  /  Alb  3.4  /  TBili  0.6  /  DBili  x   /  AST  13  /  ALT  8[L]  /  AlkPhos  104  10-07   PTT - ( 07 Oct 2024 07:25 )  PTT:57.8 sec    PHYSICAL EXAM:  Gen: NAD  Skin: No rashes, bruises, or lesions  Head: Normocephalic, Atraumatic  Face: no edema, erythema, or fluctuance. Parotid glands soft without mass  Eyes: no scleral injection  Nose: Nares bilaterally patent, no discharge  Neck: Flat, supple, no lymphadenopathy, trachea midline, no masses  Lymphatic: No lymphadenopathy  Resp: nasal cannula in place, no stridor  CV: no peripheral edema/cyanosis  GI: nondistended   Peripheral vascular: no JVD or edema  Neuro: facial nerve intact, no facial droop        Procedure Note:  Procedure: NGT placement  Diagnosis: dysphagia   Verbal consent obtained from patient. Lube applied to small keofeed tube. Keofeed advanced through L nare without resistance under visualization using indirect laryngoscope. Tip of tube visualized in pyriform sinus. Pt asked to swallow. Tube advanced through esophageal inlet without resistance. Placement confirmed with auscultation of air in stomach. Placed nasal bridle. Pending CXR confirmation.

## 2024-10-07 NOTE — PHYSICAL THERAPY INITIAL EVALUATION ADULT - GENERAL OBSERVATIONS, REHAB EVAL
Pt rec'd semisupine in bed, +NGT, +IV Heparin, +bilateral UE mittens, +telemetry, +pulse ox, +condom catheter, +sling to right shoulder, in NAD.  Pt cleared for PT session by DAGMAR Preston. Pt rec'd semisupine in bed, +NGT, +IV Heparin, +NC x 4L, +bilateral UE mittens, +telemetry, +pulse ox, +condom catheter, +sling to right shoulder, in NAD.  Pt cleared for PT session by DAGMAR Preston.

## 2024-10-07 NOTE — PROGRESS NOTE ADULT - ATTENDING COMMENTS
No acute events reported overnight.  The patient reports that he is hungry this morning.  He has fluctuating mental status.  He denies any cardiopulmonary complaints upon examination.  Denies any shortness of breath, chest pain/tightness discomfort no fevers, chills or sweats.  Agree with 14 point review of systems and physical examination as noted above.    Patient with intermediate high risk pulmonary embolism.  Patient respiratory decompensation is secondary to multiple etiologies.  Continue heparin drip for anticoagulation therapy.  If no plan for any intervention/procedures would recommend patient be transition to DOAC.  Lower extremity venous duplex study was reviewed on 10/7 that demonstrated acute thrombus is present within the distal femoral and popliteal veins.  Acute thrombus is present within the posterior tibial, peroneal and soleal veins.  Continue to closely monitor for any changes in terms of swelling, paresthesias or pulses in legs.  Closely monitor for any signs and symptoms of bleeding.

## 2024-10-07 NOTE — PROGRESS NOTE ADULT - PROBLEM SELECTOR PLAN 9
DVT Ppx: Heparin gtt.  Diet: TF  Dispo: TBD DVT Ppx: Heparin gtt.  Diet: TF once NG tube replaced, currently NPO  Dispo: TBD

## 2024-10-07 NOTE — PHYSICAL THERAPY INITIAL EVALUATION ADULT - FUNCTIONAL LIMITATIONS, PT EVAL
09/06/22 0613   Sleep Analysis   Sleep Report Good   Sleep/Wake Cycle Unremarkable   Hours Slept 8.75+  (asleep at 2100)     Pt did awaken at 0400 and given a cup of ice water and then asleep at 0415. Pt appeared to sleep soundly throughout the night. Unable to complete C-SSRS at this time, maintain current plan of care.       self-care/home management/community/leisure

## 2024-10-07 NOTE — PROGRESS NOTE ADULT - ASSESSMENT
69-year-old w/ PMH of CVA (unknown when), CAD s/p CABG and likely SAVR with bioprosthetic valve, Atrial flutter (not on AC, unknown why), presents as a transfer from [place] for intermediate high risk PE, after he initially presented for unwitnessed fall c/b humeral fracture with course c/b AMS and hypoxia thought 2/2 PNA requiring intubation and later complicated by hypoxia and tachycardia following extubation prompting diagnosis of PE. There has been a c/f possible SDH vs intracranial process on imaging at OSH. He remains with AMS (AAOx2), HDS without tachycardia, and remains off O2. He remains asymptomatic from PE at rest, and functional status is challenging to assess given patients mental status and inability to safely leave bed at this time. This is further complicated by possible PNA for which he is on antibiotics. Given his stability and improved objective markers (HR, labwork), no plan for catheter based intervention of his PE at this time.     #Int. High risk PE - elevated trop and BNP, RH strain on TTE    Recs  - Neurology vs nsgy eval given c/f intracranial process and to discuss safety of AC  - If cleared by above team, continue heparin gtt for AC of PE - will transition to DOAC ultimately   - LE venous dupplex  - Continue home antihypertensives      Jovan Waite MD  Cardiology Fellow     All Cardiology service information can be found 24/7 on [email or web address], password: [email or web address]

## 2024-10-07 NOTE — PROGRESS NOTE ADULT - PROBLEM SELECTOR PLAN 6
> C/w amlodipine 10mg qd Opioid Pregnancy And Lactation Text: These medications can lead to premature delivery and should be avoided during pregnancy. These medications are also present in breast milk in small amounts.

## 2024-10-07 NOTE — CONSULT NOTE ADULT - ASSESSMENT
68 y/o Male with PMHx of HTN, T2DM, CVA, A. flutter, CAD s/p CABG x3 (~2018), and alcohol use disorder who initially presented to [facility] on 9/14 for a mechanical fall after he tripped and fell onto his R shoulder, found to have PE at [facility]. Patient was transferred to [facility] for further management of PE and embolectomy evaluation. ENT consulted for NGT placement. Successfully placed through Left nare along with nasal bridle, pending CXR confirmation.

## 2024-10-08 LAB
ALBUMIN SERPL ELPH-MCNC: 3.4 G/DL — SIGNIFICANT CHANGE UP (ref 3.3–5)
ALP SERPL-CCNC: 104 U/L — SIGNIFICANT CHANGE UP (ref 40–120)
ALT FLD-CCNC: 8 U/L — LOW (ref 10–45)
ANION GAP SERPL CALC-SCNC: 15 MMOL/L — SIGNIFICANT CHANGE UP (ref 5–17)
APTT BLD: 68.2 SEC — HIGH (ref 24.5–35.6)
APTT BLD: 81 SEC — HIGH (ref 24.5–35.6)
AST SERPL-CCNC: 11 U/L — SIGNIFICANT CHANGE UP (ref 10–40)
BASOPHILS # BLD AUTO: 0.07 K/UL — SIGNIFICANT CHANGE UP (ref 0–0.2)
BASOPHILS NFR BLD AUTO: 1 % — SIGNIFICANT CHANGE UP (ref 0–2)
BILIRUB SERPL-MCNC: 0.6 MG/DL — SIGNIFICANT CHANGE UP (ref 0.2–1.2)
BUN SERPL-MCNC: 8 MG/DL — SIGNIFICANT CHANGE UP (ref 7–23)
CALCIUM SERPL-MCNC: 9.6 MG/DL — SIGNIFICANT CHANGE UP (ref 8.4–10.5)
CHLORIDE SERPL-SCNC: 97 MMOL/L — SIGNIFICANT CHANGE UP (ref 96–108)
CO2 SERPL-SCNC: 27 MMOL/L — SIGNIFICANT CHANGE UP (ref 22–31)
CREAT SERPL-MCNC: 0.8 MG/DL — SIGNIFICANT CHANGE UP (ref 0.5–1.3)
EGFR: 97 ML/MIN/1.73M2 — SIGNIFICANT CHANGE UP
EOSINOPHIL # BLD AUTO: 0.19 K/UL — SIGNIFICANT CHANGE UP (ref 0–0.5)
EOSINOPHIL NFR BLD AUTO: 2.7 % — SIGNIFICANT CHANGE UP (ref 0–6)
GLUCOSE BLDC GLUCOMTR-MCNC: 109 MG/DL — HIGH (ref 70–99)
GLUCOSE BLDC GLUCOMTR-MCNC: 122 MG/DL — HIGH (ref 70–99)
GLUCOSE BLDC GLUCOMTR-MCNC: 127 MG/DL — HIGH (ref 70–99)
GLUCOSE BLDC GLUCOMTR-MCNC: 129 MG/DL — HIGH (ref 70–99)
GLUCOSE SERPL-MCNC: 128 MG/DL — HIGH (ref 70–99)
HCT VFR BLD CALC: 33.6 % — LOW (ref 39–50)
HGB BLD-MCNC: 10.5 G/DL — LOW (ref 13–17)
IMM GRANULOCYTES NFR BLD AUTO: 0.3 % — SIGNIFICANT CHANGE UP (ref 0–0.9)
LYMPHOCYTES # BLD AUTO: 0.95 K/UL — LOW (ref 1–3.3)
LYMPHOCYTES # BLD AUTO: 13.4 % — SIGNIFICANT CHANGE UP (ref 13–44)
MAGNESIUM SERPL-MCNC: 2.1 MG/DL — SIGNIFICANT CHANGE UP (ref 1.6–2.6)
MCHC RBC-ENTMCNC: 30.1 PG — SIGNIFICANT CHANGE UP (ref 27–34)
MCHC RBC-ENTMCNC: 31.3 GM/DL — LOW (ref 32–36)
MCV RBC AUTO: 96.3 FL — SIGNIFICANT CHANGE UP (ref 80–100)
MONOCYTES # BLD AUTO: 0.56 K/UL — SIGNIFICANT CHANGE UP (ref 0–0.9)
MONOCYTES NFR BLD AUTO: 7.9 % — SIGNIFICANT CHANGE UP (ref 2–14)
NEUTROPHILS # BLD AUTO: 5.31 K/UL — SIGNIFICANT CHANGE UP (ref 1.8–7.4)
NEUTROPHILS NFR BLD AUTO: 74.7 % — SIGNIFICANT CHANGE UP (ref 43–77)
NRBC # BLD: 0 /100 WBCS — SIGNIFICANT CHANGE UP (ref 0–0)
PHOSPHATE SERPL-MCNC: 3.9 MG/DL — SIGNIFICANT CHANGE UP (ref 2.5–4.5)
PLATELET # BLD AUTO: 278 K/UL — SIGNIFICANT CHANGE UP (ref 150–400)
POTASSIUM SERPL-MCNC: 4 MMOL/L — SIGNIFICANT CHANGE UP (ref 3.5–5.3)
POTASSIUM SERPL-SCNC: 4 MMOL/L — SIGNIFICANT CHANGE UP (ref 3.5–5.3)
PROT SERPL-MCNC: 7.3 G/DL — SIGNIFICANT CHANGE UP (ref 6–8.3)
RBC # BLD: 3.49 M/UL — LOW (ref 4.2–5.8)
RBC # FLD: 13.1 % — SIGNIFICANT CHANGE UP (ref 10.3–14.5)
SODIUM SERPL-SCNC: 139 MMOL/L — SIGNIFICANT CHANGE UP (ref 135–145)
UFH PPP CHRO-ACNC: 0.39 IU/ML — SIGNIFICANT CHANGE UP (ref 0.3–0.7)
WBC # BLD: 7.1 K/UL — SIGNIFICANT CHANGE UP (ref 3.8–10.5)
WBC # FLD AUTO: 7.1 K/UL — SIGNIFICANT CHANGE UP (ref 3.8–10.5)

## 2024-10-08 PROCEDURE — 99232 SBSQ HOSP IP/OBS MODERATE 35: CPT | Mod: GC

## 2024-10-08 PROCEDURE — 70553 MRI BRAIN STEM W/O & W/DYE: CPT | Mod: 26

## 2024-10-08 RX ORDER — METOPROLOL TARTRATE 50 MG
25 TABLET ORAL
Refills: 0 | Status: DISCONTINUED | OUTPATIENT
Start: 2024-10-08 | End: 2024-10-24

## 2024-10-08 RX ORDER — HEPARIN SODIUM 10000 [USP'U]/ML
1400 INJECTION INTRAVENOUS; SUBCUTANEOUS
Qty: 25000 | Refills: 0 | Status: DISCONTINUED | OUTPATIENT
Start: 2024-10-08 | End: 2024-10-09

## 2024-10-08 RX ORDER — APIXABAN 5 MG/1
10 TABLET, FILM COATED ORAL EVERY 12 HOURS
Refills: 0 | Status: DISCONTINUED | OUTPATIENT
Start: 2024-10-08 | End: 2024-10-10

## 2024-10-08 RX ADMIN — HEPARIN SODIUM 14 UNIT(S)/HR: 10000 INJECTION INTRAVENOUS; SUBCUTANEOUS at 10:07

## 2024-10-08 RX ADMIN — APIXABAN 10 MILLIGRAM(S): 5 TABLET, FILM COATED ORAL at 22:14

## 2024-10-08 RX ADMIN — FLUTICASONE PROPIONATE AND SALMETEROL XINAFOATE 1 DOSE(S): 230; 21 AEROSOL, METERED RESPIRATORY (INHALATION) at 06:45

## 2024-10-08 RX ADMIN — Medication 10 MILLIGRAM(S): at 05:11

## 2024-10-08 RX ADMIN — SODIUM CHLORIDE 4 MILLILITER(S): 9 INJECTION, SOLUTION INTRAMUSCULAR; INTRAVENOUS; SUBCUTANEOUS at 17:15

## 2024-10-08 RX ADMIN — FLUTICASONE PROPIONATE AND SALMETEROL XINAFOATE 1 DOSE(S): 230; 21 AEROSOL, METERED RESPIRATORY (INHALATION) at 17:08

## 2024-10-08 RX ADMIN — PIPERACILLIN AND TAZOBACTAM 25 GRAM(S): .5; 4 INJECTION, POWDER, LYOPHILIZED, FOR SOLUTION INTRAVENOUS at 05:12

## 2024-10-08 RX ADMIN — Medication 5 MILLIGRAM(S): at 00:26

## 2024-10-08 RX ADMIN — PIPERACILLIN AND TAZOBACTAM 25 GRAM(S): .5; 4 INJECTION, POWDER, LYOPHILIZED, FOR SOLUTION INTRAVENOUS at 14:37

## 2024-10-08 RX ADMIN — Medication 5 MILLIGRAM(S): at 12:19

## 2024-10-08 RX ADMIN — Medication 100 MILLIGRAM(S): at 12:15

## 2024-10-08 RX ADMIN — Medication 200 MILLIGRAM(S): at 05:12

## 2024-10-08 RX ADMIN — PANTOPRAZOLE SODIUM 40 MILLIGRAM(S): 40 TABLET, DELAYED RELEASE ORAL at 12:18

## 2024-10-08 RX ADMIN — Medication 5 MILLIGRAM(S): at 05:12

## 2024-10-08 RX ADMIN — Medication 25 MILLIGRAM(S): at 17:08

## 2024-10-08 RX ADMIN — SODIUM CHLORIDE 4 MILLILITER(S): 9 INJECTION, SOLUTION INTRAMUSCULAR; INTRAVENOUS; SUBCUTANEOUS at 05:12

## 2024-10-08 RX ADMIN — PIPERACILLIN AND TAZOBACTAM 25 GRAM(S): .5; 4 INJECTION, POWDER, LYOPHILIZED, FOR SOLUTION INTRAVENOUS at 22:14

## 2024-10-08 NOTE — PROGRESS NOTE ADULT - ATTENDING COMMENTS
Patient is a 69 year old male, with PMH of HTN, DM2, ?CVA/TIA, Aflutter, CAD s/p CABG (2018), EtOH use disorder (two 40oz beers daily), current smoker (8 cigs/day), L total hip arthroplasty, initially admitted to [facility] (9/14/24-10/4/24) after mech fall c/b R prox humeral fx and EtOH withdrawal. Was on CIWA with Librium/Ativan PRN. TTE on 9/15/24 showed LVEF 60-65%, grad I DD, and an aortic valve prosthesis with normal function. Course c/b AMS, acute hypoxemic/hypercapnic respiratory, and agonal breathing on 9/18/24. Was initially given flumazenil x2 with some improvement, but was intubated for airway protection given excessive secretions and transferred to MICU 9/18/24. CTA chest was somewhat limited study but did not find PE at the time, noted RUL GGOs "suspicious for atypical or viral infection." CTH showed subacute and chronic SDHs as well as chronic lacunar infarct within L lentiform nucleus. Repeat CT showed stable SDHs. Extubated on 9/19/24. Pt was downgraded to Medicine floors on 9/21/24. Noted to have loss of voice and dysphagia. Evaluated by S+S, found dysphagia on video flouroscopy to all textures and recommended for PEG tube, but pt refused and opted for NGT placement. Ortho surgery that was planned for humeral fx was cancelled and ultimately recommended for conservative management only. CXR on 10/3/24 showed new LLL infiltrate, started on Zosyn. On 10/4/24, pt desatted on supplemental O2, CTA chest found massive PE in R main bronchus with pulm trunk dilatation. BLE duplex also found b/l LE DVTs (L>R). Started on hep gtt and transferred to [facility] for embolectomy evaluation.    #Acute hypoxemic respiratory failure  #R main bronchus PE, b/l LE DVTs  #?PNA  #Dysphagia, hypophonia  #R humeral fx  #Chronic aflutter  #DM2  #EtOH/tobacco use disorder    - Vascular Cardiology following  - c/w hep gtt, monitoring anti-Xa q6h for now until therapeutic per Vasc Cards (target 0.4-0.6 given SDH). Plan to transition to DOAC today with eliquis 10mg BID for 7 days (including heparin drip days) and then change to 5mg BID. Will need to do price check for eliquis.   - c/w empiric zosyn for now; likely continue for 7 day course   - c/w amio and metoprolol (was on at OSH)  - c/w advair BID and duonebs q6h PRN  - c/w low ISS for now, monitor FS  - monitor on telemetry  - repeat CTH stable  - f/u MRI head given dysphagia/dysphonia  - TTE noted with no acute findings   - S+S eval pending  - Ortho states no plan for intervention inpatient for humerus fracture; outpatient follow up     - started on NG feeds 10/5 but patient pulled out NGT 10/6 overnight; ENT replaced NGT on 10/7. Resume NGT feeds todat until S/S eval; dietitian recs noted   - wean O2 as tolerable; currently on 4L NC but saturating well    - VA duplex LE with extensive blood clots bilaterally; no interventions planned as per vascular cardio   - PT eval pending     Rest as above. Discussed with HS.

## 2024-10-08 NOTE — PROGRESS NOTE ADULT - PROBLEM SELECTOR PLAN 2
- Hospital course at [facility] c/b AHRF  - S/p intubation on 9/18 for likely benzodiazepine overdose, extubated on 9/19  - Diffuse rhonchi with copious secretions on exam  - CTA chest (10/4) -> Large PE of R main pulmonary artery, scattered GGOs and interstitial changes predominantly in R and L lower lobes  - Likely PE +/- superimposed aspiration PNA  > C/w management of PE, as above  > C/w empiric Zosyn  > C/w oxygen supplementation prn - Hospital course at [facility] c/b AHRF  - S/p intubation on 9/18 for likely benzodiazepine overdose, extubated on 9/19  - Diffuse rhonchi with copious secretions on exam  - CTA chest (10/4) -> Large PE of R main pulmonary artery, scattered GGOs and interstitial changes predominantly in R and L lower lobes  - Likely PE +/- superimposed aspiration PNA  > C/w management of PE, as above  > C/w empiric Zosyn D1:10/5  > C/w oxygen supplementation prn  -wean as tolerated

## 2024-10-08 NOTE — PROGRESS NOTE ADULT - PROBLEM SELECTOR PLAN 3
- US of b/l LEs at [facility] -> R peroneal vein and L popliteal, tibial trunk, peroneal, and posterior tibial DVTs  > Repeat US of b/l LEs ordered  > C/w heparin gtt., as above - US of b/l LEs at [facility] -> R peroneal vein and L popliteal, tibial trunk, peroneal, and posterior tibial DVTs  > Repeat US of b/l LEs showing mulitple DVTs  > C/w heparin gtt. then eliquis, as above

## 2024-10-08 NOTE — PROGRESS NOTE ADULT - PROBLEM SELECTOR PLAN 8
- Significant dysphagia s/p extubation on 9/19  - Patient declined PEG tube, NG tube placed instead, pt pulled out, will attempt replacement   > S&S eval ordered  > MRI brain ordered (MRI needed more imaging of CXR (done) and brain imaging (ordered) before MRI) - Significant dysphagia s/p extubation on 9/19  - Patient declined PEG tube, NG tube placed instead, pt pulled out, now replaced  > S&S eval ordered  > MRI brain ordered

## 2024-10-08 NOTE — PROGRESS NOTE ADULT - PROBLEM SELECTOR PLAN 9
DVT Ppx: Heparin gtt.  Diet: TF once NG tube replaced, currently NPO  Dispo: TBD DVT Ppx: Heparin gtt.  Diet: TF  Dispo: TBD DVT Ppx: Heparin gtt. transition to eliquis   Diet: TF  Dispo: TBD

## 2024-10-08 NOTE — PROGRESS NOTE ADULT - SUBJECTIVE AND OBJECTIVE BOX
PROGRESS NOTE:   Authored by: Stephanie Pedroza MD  PGY-1    Patient is a 69y old  Male who presents with a chief complaint of Pulmonary Embolism (07 Oct 2024 18:53)      SUBJECTIVE / OVERNIGHT EVENTS:  No acute events overnight. Pt doing well this morning.    MEDICATIONS  (STANDING):  aMIOdarone    Tablet 200 milliGRAM(s) Oral daily  amLODIPine   Tablet 10 milliGRAM(s) Oral daily  dextrose 5%. 1000 milliLiter(s) (100 mL/Hr) IV Continuous <Continuous>  dextrose 5%. 1000 milliLiter(s) (50 mL/Hr) IV Continuous <Continuous>  dextrose 50% Injectable 12.5 Gram(s) IV Push once  dextrose 50% Injectable 25 Gram(s) IV Push once  dextrose 50% Injectable 25 Gram(s) IV Push once  dextrose Oral Gel 15 Gram(s) Oral once  fluticasone propionate/ salmeterol 250-50 MICROgram(s) Diskus 1 Dose(s) Inhalation two times a day  glucagon  Injectable 1 milliGRAM(s) IntraMuscular once  heparin  Infusion 1500 Unit(s)/Hr (14 mL/Hr) IV Continuous <Continuous>  insulin lispro (ADMELOG) corrective regimen sliding scale   SubCutaneous at bedtime  insulin lispro (ADMELOG) corrective regimen sliding scale   SubCutaneous three times a day before meals  metoprolol tartrate Injectable 5 milliGRAM(s) IV Push every 6 hours  pantoprazole  Injectable 40 milliGRAM(s) IV Push daily  piperacillin/tazobactam IVPB.. 3.375 Gram(s) IV Intermittent every 8 hours  polyethylene glycol 3350 17 Gram(s) Oral daily  sodium chloride 3%  Inhalation 4 milliLiter(s) Inhalation every 12 hours  thiamine 100 milliGRAM(s) Oral daily    MEDICATIONS  (PRN):  albuterol/ipratropium for Nebulization 3 milliLiter(s) Nebulizer every 6 hours PRN Shortness of Breath and/or Wheezing      CAPILLARY BLOOD GLUCOSE      POCT Blood Glucose.: 122 mg/dL (08 Oct 2024 11:14)  POCT Blood Glucose.: 129 mg/dL (08 Oct 2024 06:42)  POCT Blood Glucose.: 112 mg/dL (07 Oct 2024 23:54)  POCT Blood Glucose.: 116 mg/dL (07 Oct 2024 21:40)  POCT Blood Glucose.: 128 mg/dL (07 Oct 2024 17:50)    I&O's Summary    07 Oct 2024 07:01  -  08 Oct 2024 07:00  --------------------------------------------------------  IN: 0 mL / OUT: 450 mL / NET: -450 mL        PHYSICAL EXAM:  Vital Signs Last 24 Hrs  T(C): 37.1 (08 Oct 2024 11:30), Max: 37.1 (08 Oct 2024 04:28)  T(F): 98.7 (08 Oct 2024 11:30), Max: 98.7 (08 Oct 2024 04:28)  HR: 83 (08 Oct 2024 11:30) (73 - 87)  BP: 119/82 (08 Oct 2024 11:30) (117/77 - 129/88)  BP(mean): --  RR: 18 (08 Oct 2024 11:30) (18 - 18)  SpO2: 98% (08 Oct 2024 11:30) (96% - 100%)    Parameters below as of 08 Oct 2024 11:30  Patient On (Oxygen Delivery Method): nasal cannula  O2 Flow (L/min): 4      CONSTITUTIONAL: NAD, well-developed  RESPIRATORY: Normal respiratory effort; lungs are clear to auscultation bilaterally  CARDIOVASCULAR: Regular rate and rhythm, normal S1 and S2, no murmur/rub/gallop; Peripheral pulses are 2+ bilaterally  ABDOMEN: Nontender to palpation, normoactive bowel sounds, no rebound/guarding  MUSCLOSKELETAL:  Moving all limbs spontaneously; No lower extremity edema  PSYCH: Affect appropriate    LABS:                        10.5   7.10  )-----------( 278      ( 08 Oct 2024 06:48 )             33.6     10-08    139  |  97  |  8   ----------------------------<  128[H]  4.0   |  27  |  0.80    Ca    9.6      08 Oct 2024 06:48  Phos  3.9     10-08  Mg     2.1     10-08    TPro  7.3  /  Alb  3.4  /  TBili  0.6  /  DBili  x   /  AST  11  /  ALT  8[L]  /  AlkPhos  104  10-08    PTT - ( 08 Oct 2024 06:51 )  PTT:81.0 sec        MICRO:  Urinalysis Basic - ( 08 Oct 2024 06:48 )    Color: x / Appearance: x / SG: x / pH: x  Gluc: 128 mg/dL / Ketone: x  / Bili: x / Urobili: x   Blood: x / Protein: x / Nitrite: x   Leuk Esterase: x / RBC: x / WBC x   Sq Epi: x / Non Sq Epi: x / Bacteria: x          IMAGING: PROGRESS NOTE:   Authored by: Stephanie Pedroza MD  PGY-1    Patient is a 69y old  Male who presents with a chief complaint of Pulmonary Embolism (07 Oct 2024 18:53)      SUBJECTIVE / OVERNIGHT EVENTS:  Pt got NG tube yest. Pt otherwise doing well this morning.    MEDICATIONS  (STANDING):  aMIOdarone    Tablet 200 milliGRAM(s) Oral daily  amLODIPine   Tablet 10 milliGRAM(s) Oral daily  dextrose 5%. 1000 milliLiter(s) (100 mL/Hr) IV Continuous <Continuous>  dextrose 5%. 1000 milliLiter(s) (50 mL/Hr) IV Continuous <Continuous>  dextrose 50% Injectable 12.5 Gram(s) IV Push once  dextrose 50% Injectable 25 Gram(s) IV Push once  dextrose 50% Injectable 25 Gram(s) IV Push once  dextrose Oral Gel 15 Gram(s) Oral once  fluticasone propionate/ salmeterol 250-50 MICROgram(s) Diskus 1 Dose(s) Inhalation two times a day  glucagon  Injectable 1 milliGRAM(s) IntraMuscular once  heparin  Infusion 1500 Unit(s)/Hr (14 mL/Hr) IV Continuous <Continuous>  insulin lispro (ADMELOG) corrective regimen sliding scale   SubCutaneous at bedtime  insulin lispro (ADMELOG) corrective regimen sliding scale   SubCutaneous three times a day before meals  metoprolol tartrate Injectable 5 milliGRAM(s) IV Push every 6 hours  pantoprazole  Injectable 40 milliGRAM(s) IV Push daily  piperacillin/tazobactam IVPB.. 3.375 Gram(s) IV Intermittent every 8 hours  polyethylene glycol 3350 17 Gram(s) Oral daily  sodium chloride 3%  Inhalation 4 milliLiter(s) Inhalation every 12 hours  thiamine 100 milliGRAM(s) Oral daily    MEDICATIONS  (PRN):  albuterol/ipratropium for Nebulization 3 milliLiter(s) Nebulizer every 6 hours PRN Shortness of Breath and/or Wheezing      CAPILLARY BLOOD GLUCOSE      POCT Blood Glucose.: 122 mg/dL (08 Oct 2024 11:14)  POCT Blood Glucose.: 129 mg/dL (08 Oct 2024 06:42)  POCT Blood Glucose.: 112 mg/dL (07 Oct 2024 23:54)  POCT Blood Glucose.: 116 mg/dL (07 Oct 2024 21:40)  POCT Blood Glucose.: 128 mg/dL (07 Oct 2024 17:50)    I&O's Summary    07 Oct 2024 07:01  -  08 Oct 2024 07:00  --------------------------------------------------------  IN: 0 mL / OUT: 450 mL / NET: -450 mL        PHYSICAL EXAM:  Vital Signs Last 24 Hrs  T(C): 37.1 (08 Oct 2024 11:30), Max: 37.1 (08 Oct 2024 04:28)  T(F): 98.7 (08 Oct 2024 11:30), Max: 98.7 (08 Oct 2024 04:28)  HR: 83 (08 Oct 2024 11:30) (73 - 87)  BP: 119/82 (08 Oct 2024 11:30) (117/77 - 129/88)  BP(mean): --  RR: 18 (08 Oct 2024 11:30) (18 - 18)  SpO2: 98% (08 Oct 2024 11:30) (96% - 100%)    Parameters below as of 08 Oct 2024 11:30  Patient On (Oxygen Delivery Method): nasal cannula  O2 Flow (L/min): 4      CONSTITUTIONAL: NAD, AOx1  RESPIRATORY: Normal respiratory effort; mild rhonchi bilat  CARDIOVASCULAR: Regular rate and rhythm, normal S1 and S2, no murmur/rub/gallop; Peripheral pulses are 2+ bilaterally  ABDOMEN: Nontender to palpation, normoactive bowel sounds, no rebound/guarding  MUSCLOSKELETAL:  Moving all limbs spontaneously; No lower extremity edema    LABS:                        10.5   7.10  )-----------( 278      ( 08 Oct 2024 06:48 )             33.6     10-08    139  |  97  |  8   ----------------------------<  128[H]  4.0   |  27  |  0.80    Ca    9.6      08 Oct 2024 06:48  Phos  3.9     10-08  Mg     2.1     10-08    TPro  7.3  /  Alb  3.4  /  TBili  0.6  /  DBili  x   /  AST  11  /  ALT  8[L]  /  AlkPhos  104  10-08    PTT - ( 08 Oct 2024 06:51 )  PTT:81.0 sec        MICRO:  Urinalysis Basic - ( 08 Oct 2024 06:48 )    Color: x / Appearance: x / SG: x / pH: x  Gluc: 128 mg/dL / Ketone: x  / Bili: x / Urobili: x   Blood: x / Protein: x / Nitrite: x   Leuk Esterase: x / RBC: x / WBC x   Sq Epi: x / Non Sq Epi: x / Bacteria: x          IMAGING:< from: Xray Chest 1 View- PORTABLE-Urgent (Xray Chest 1 View- PORTABLE-Urgent .) (10.07.24 @ 23:29) >  IMPRESSION:  Enteric tube with tip in the stomach.    < end of copied text >

## 2024-10-08 NOTE — PROGRESS NOTE ADULT - ASSESSMENT
Mr. Jarrett Overton is a 69-year-old w/ PMH of HTN, T2DM, CVA, A. flutter, CAD s/p CABG x3 (~2018), and alcohol use disorder who initially presented to [facility] on 9/14 for a mechanical fall. On 10/4, patient was found to be hypoxic with subsequent CTA chest showing large PE of R main pulmonary artery. US of b/l LEs was further significant for b/l DVTs in the R peroneal vein and L popliteal, tibial trunk, peroneal, and posterior tibial veins. Patient was transferred to [facility] for further management of PE and embolectomy evaluation.

## 2024-10-08 NOTE — PROGRESS NOTE ADULT - PROBLEM SELECTOR PLAN 1
- Hospital course at [facility] c/b AHRF  - CTA chest (10/4) -> Large PE of R main pulmonary artery  - US of b/l LEs -> R peroneal vein and L popliteal, tibial trunk, peroneal, and posterior tibial DVTs  - EKG showing evidence of right heart strain, S1Q3T3 changes, T wave depressions in precordial leads; not present on EKG from 9/15  - TTE (9/15) unremarkable  - Troponin 92, BNP 7168 on transfer  > Pending CTH  > C/w heparin gtt w/ target PTT-> 0.4-0.6 Repeat TTE not concerning  > Vascular cardiology consulted rec heparin for now and eventual transition to DOAC  - Neurosurg consulted for AC management, they want CT head - Hospital course at [facility] c/b AHRF  - CTA chest (10/4) -> Large PE of R main pulmonary artery  - US of b/l LEs -> R peroneal vein and L popliteal, tibial trunk, peroneal, and posterior tibial DVTs  - EKG showing evidence of right heart strain, S1Q3T3 changes, T wave depressions in precordial leads; not present on EKG from 9/15  - TTE (9/15) unremarkable  - Troponin 92, BNP 7168 on transfer  > CTH not acutely concerning  > C/w heparin gtt w/ target PTT-> 0.4-0.6 Repeat TTE not concerning  > Vascular cardiology consulted rec heparin for now and eventual transition to DOAC  - Neurosurg consulted for AC management, they said okay to PTT 60-80  -plan to transition to DOAC today

## 2024-10-08 NOTE — PROGRESS NOTE ADULT - PROBLEM SELECTOR PLAN 4
- Initially presented to [facility] on 9/14 for a mechanical fall after he tripped and fell onto his R shoulder  - XR showing R proximal humerus fracture, ORIF with Orthopedic Surgery at [facility] cancelled due to MICU course  > Orthopedic surgery on 10/7 said do outpt surgery.

## 2024-10-09 DIAGNOSIS — I63.9 CEREBRAL INFARCTION, UNSPECIFIED: ICD-10-CM

## 2024-10-09 LAB
ALBUMIN SERPL ELPH-MCNC: 3.5 G/DL — SIGNIFICANT CHANGE UP (ref 3.3–5)
ALP SERPL-CCNC: 99 U/L — SIGNIFICANT CHANGE UP (ref 40–120)
ALT FLD-CCNC: 7 U/L — LOW (ref 10–45)
ANION GAP SERPL CALC-SCNC: 15 MMOL/L — SIGNIFICANT CHANGE UP (ref 5–17)
AST SERPL-CCNC: 10 U/L — SIGNIFICANT CHANGE UP (ref 10–40)
BASOPHILS # BLD AUTO: 0.06 K/UL — SIGNIFICANT CHANGE UP (ref 0–0.2)
BASOPHILS NFR BLD AUTO: 1 % — SIGNIFICANT CHANGE UP (ref 0–2)
BILIRUB SERPL-MCNC: 0.6 MG/DL — SIGNIFICANT CHANGE UP (ref 0.2–1.2)
BUN SERPL-MCNC: 7 MG/DL — SIGNIFICANT CHANGE UP (ref 7–23)
CALCIUM SERPL-MCNC: 9.6 MG/DL — SIGNIFICANT CHANGE UP (ref 8.4–10.5)
CHLORIDE SERPL-SCNC: 97 MMOL/L — SIGNIFICANT CHANGE UP (ref 96–108)
CO2 SERPL-SCNC: 28 MMOL/L — SIGNIFICANT CHANGE UP (ref 22–31)
CREAT SERPL-MCNC: 0.77 MG/DL — SIGNIFICANT CHANGE UP (ref 0.5–1.3)
EGFR: 98 ML/MIN/1.73M2 — SIGNIFICANT CHANGE UP
EOSINOPHIL # BLD AUTO: 0.21 K/UL — SIGNIFICANT CHANGE UP (ref 0–0.5)
EOSINOPHIL NFR BLD AUTO: 3.5 % — SIGNIFICANT CHANGE UP (ref 0–6)
GLUCOSE BLDC GLUCOMTR-MCNC: 113 MG/DL — HIGH (ref 70–99)
GLUCOSE BLDC GLUCOMTR-MCNC: 126 MG/DL — HIGH (ref 70–99)
GLUCOSE BLDC GLUCOMTR-MCNC: 129 MG/DL — HIGH (ref 70–99)
GLUCOSE BLDC GLUCOMTR-MCNC: 136 MG/DL — HIGH (ref 70–99)
GLUCOSE SERPL-MCNC: 123 MG/DL — HIGH (ref 70–99)
HCT VFR BLD CALC: 30.6 % — LOW (ref 39–50)
HGB BLD-MCNC: 9.6 G/DL — LOW (ref 13–17)
IMM GRANULOCYTES NFR BLD AUTO: 0.2 % — SIGNIFICANT CHANGE UP (ref 0–0.9)
LYMPHOCYTES # BLD AUTO: 1.07 K/UL — SIGNIFICANT CHANGE UP (ref 1–3.3)
LYMPHOCYTES # BLD AUTO: 17.8 % — SIGNIFICANT CHANGE UP (ref 13–44)
MAGNESIUM SERPL-MCNC: 1.9 MG/DL — SIGNIFICANT CHANGE UP (ref 1.6–2.6)
MCHC RBC-ENTMCNC: 29.5 PG — SIGNIFICANT CHANGE UP (ref 27–34)
MCHC RBC-ENTMCNC: 31.4 GM/DL — LOW (ref 32–36)
MCV RBC AUTO: 94.2 FL — SIGNIFICANT CHANGE UP (ref 80–100)
MONOCYTES # BLD AUTO: 0.65 K/UL — SIGNIFICANT CHANGE UP (ref 0–0.9)
MONOCYTES NFR BLD AUTO: 10.8 % — SIGNIFICANT CHANGE UP (ref 2–14)
NEUTROPHILS # BLD AUTO: 4.01 K/UL — SIGNIFICANT CHANGE UP (ref 1.8–7.4)
NEUTROPHILS NFR BLD AUTO: 66.7 % — SIGNIFICANT CHANGE UP (ref 43–77)
NRBC # BLD: 0 /100 WBCS — SIGNIFICANT CHANGE UP (ref 0–0)
PHOSPHATE SERPL-MCNC: 3.9 MG/DL — SIGNIFICANT CHANGE UP (ref 2.5–4.5)
PLATELET # BLD AUTO: 268 K/UL — SIGNIFICANT CHANGE UP (ref 150–400)
POTASSIUM SERPL-MCNC: 3.4 MMOL/L — LOW (ref 3.5–5.3)
POTASSIUM SERPL-SCNC: 3.4 MMOL/L — LOW (ref 3.5–5.3)
PROT SERPL-MCNC: 7.1 G/DL — SIGNIFICANT CHANGE UP (ref 6–8.3)
RBC # BLD: 3.25 M/UL — LOW (ref 4.2–5.8)
RBC # FLD: 13 % — SIGNIFICANT CHANGE UP (ref 10.3–14.5)
SODIUM SERPL-SCNC: 140 MMOL/L — SIGNIFICANT CHANGE UP (ref 135–145)
WBC # BLD: 6.01 K/UL — SIGNIFICANT CHANGE UP (ref 3.8–10.5)
WBC # FLD AUTO: 6.01 K/UL — SIGNIFICANT CHANGE UP (ref 3.8–10.5)

## 2024-10-09 PROCEDURE — 99233 SBSQ HOSP IP/OBS HIGH 50: CPT | Mod: GC

## 2024-10-09 RX ORDER — APIXABAN 5 MG/1
1 TABLET, FILM COATED ORAL
Qty: 30 | Refills: 0
Start: 2024-10-09

## 2024-10-09 RX ORDER — POTASSIUM CHLORIDE 10 MEQ
40 TABLET, EXTENDED RELEASE ORAL EVERY 4 HOURS
Refills: 0 | Status: COMPLETED | OUTPATIENT
Start: 2024-10-09 | End: 2024-10-09

## 2024-10-09 RX ORDER — POTASSIUM CHLORIDE 10 MEQ
40 TABLET, EXTENDED RELEASE ORAL EVERY 4 HOURS
Refills: 0 | Status: DISCONTINUED | OUTPATIENT
Start: 2024-10-09 | End: 2024-10-09

## 2024-10-09 RX ADMIN — PIPERACILLIN AND TAZOBACTAM 25 GRAM(S): .5; 4 INJECTION, POWDER, LYOPHILIZED, FOR SOLUTION INTRAVENOUS at 13:31

## 2024-10-09 RX ADMIN — Medication 25 MILLIGRAM(S): at 18:11

## 2024-10-09 RX ADMIN — Medication 200 MILLIGRAM(S): at 06:00

## 2024-10-09 RX ADMIN — POLYETHYLENE GLYCOL 3350 17 GRAM(S): 17 POWDER, FOR SOLUTION ORAL at 13:32

## 2024-10-09 RX ADMIN — APIXABAN 10 MILLIGRAM(S): 5 TABLET, FILM COATED ORAL at 06:09

## 2024-10-09 RX ADMIN — SODIUM CHLORIDE 4 MILLILITER(S): 9 INJECTION, SOLUTION INTRAMUSCULAR; INTRAVENOUS; SUBCUTANEOUS at 06:58

## 2024-10-09 RX ADMIN — PANTOPRAZOLE SODIUM 40 MILLIGRAM(S): 40 TABLET, DELAYED RELEASE ORAL at 12:56

## 2024-10-09 RX ADMIN — Medication 10 MILLIGRAM(S): at 06:00

## 2024-10-09 RX ADMIN — FLUTICASONE PROPIONATE AND SALMETEROL XINAFOATE 1 DOSE(S): 230; 21 AEROSOL, METERED RESPIRATORY (INHALATION) at 05:59

## 2024-10-09 RX ADMIN — FLUTICASONE PROPIONATE AND SALMETEROL XINAFOATE 1 DOSE(S): 230; 21 AEROSOL, METERED RESPIRATORY (INHALATION) at 18:12

## 2024-10-09 RX ADMIN — Medication 80 MILLIGRAM(S): at 22:26

## 2024-10-09 RX ADMIN — Medication 100 MILLIGRAM(S): at 13:32

## 2024-10-09 RX ADMIN — Medication 40 MILLIEQUIVALENT(S): at 10:47

## 2024-10-09 RX ADMIN — APIXABAN 10 MILLIGRAM(S): 5 TABLET, FILM COATED ORAL at 18:11

## 2024-10-09 RX ADMIN — PIPERACILLIN AND TAZOBACTAM 25 GRAM(S): .5; 4 INJECTION, POWDER, LYOPHILIZED, FOR SOLUTION INTRAVENOUS at 22:26

## 2024-10-09 RX ADMIN — SODIUM CHLORIDE 4 MILLILITER(S): 9 INJECTION, SOLUTION INTRAMUSCULAR; INTRAVENOUS; SUBCUTANEOUS at 18:21

## 2024-10-09 RX ADMIN — Medication 40 MILLIEQUIVALENT(S): at 13:32

## 2024-10-09 RX ADMIN — PIPERACILLIN AND TAZOBACTAM 25 GRAM(S): .5; 4 INJECTION, POWDER, LYOPHILIZED, FOR SOLUTION INTRAVENOUS at 05:59

## 2024-10-09 RX ADMIN — Medication 25 MILLIGRAM(S): at 06:09

## 2024-10-09 NOTE — SWALLOW BEDSIDE ASSESSMENT ADULT - SWALLOW EVAL: SECRETION MANAGEMENT
Wet upper airway/breath sound upon encounter; pt able to expectorate material and [place]auer utilized to remove. Pt hypophonic but w/o wet/gurly vocal quality.

## 2024-10-09 NOTE — PROGRESS NOTE ADULT - SUBJECTIVE AND OBJECTIVE BOX
PROGRESS NOTE:   Authored by: Stephanie Pedroza MD  PGY-1    Patient is a 69y old  Male who presents with a chief complaint of Pulmonary Embolism (08 Oct 2024 13:04)      SUBJECTIVE / OVERNIGHT EVENTS:  No acute events overnight. Pt doing well this morning.    MEDICATIONS  (STANDING):  aMIOdarone    Tablet 200 milliGRAM(s) Oral daily  amLODIPine   Tablet 10 milliGRAM(s) Oral daily  apixaban 10 milliGRAM(s) Oral every 12 hours  dextrose 5%. 1000 milliLiter(s) (100 mL/Hr) IV Continuous <Continuous>  dextrose 5%. 1000 milliLiter(s) (50 mL/Hr) IV Continuous <Continuous>  dextrose 50% Injectable 12.5 Gram(s) IV Push once  dextrose 50% Injectable 25 Gram(s) IV Push once  dextrose 50% Injectable 25 Gram(s) IV Push once  dextrose Oral Gel 15 Gram(s) Oral once  fluticasone propionate/ salmeterol 250-50 MICROgram(s) Diskus 1 Dose(s) Inhalation two times a day  glucagon  Injectable 1 milliGRAM(s) IntraMuscular once  insulin lispro (ADMELOG) corrective regimen sliding scale   SubCutaneous three times a day before meals  insulin lispro (ADMELOG) corrective regimen sliding scale   SubCutaneous at bedtime  metoprolol tartrate 25 milliGRAM(s) Oral two times a day  pantoprazole  Injectable 40 milliGRAM(s) IV Push daily  piperacillin/tazobactam IVPB.. 3.375 Gram(s) IV Intermittent every 8 hours  polyethylene glycol 3350 17 Gram(s) Oral daily  potassium chloride   Powder 40 milliEquivalent(s) Enteral Tube every 4 hours  sodium chloride 3%  Inhalation 4 milliLiter(s) Inhalation every 12 hours  thiamine 100 milliGRAM(s) Oral daily    MEDICATIONS  (PRN):  albuterol/ipratropium for Nebulization 3 milliLiter(s) Nebulizer every 6 hours PRN Shortness of Breath and/or Wheezing      CAPILLARY BLOOD GLUCOSE      POCT Blood Glucose.: 129 mg/dL (09 Oct 2024 05:53)  POCT Blood Glucose.: 126 mg/dL (09 Oct 2024 00:30)  POCT Blood Glucose.: 127 mg/dL (08 Oct 2024 22:13)  POCT Blood Glucose.: 109 mg/dL (08 Oct 2024 17:58)  POCT Blood Glucose.: 122 mg/dL (08 Oct 2024 11:14)    I&O's Summary    08 Oct 2024 07:01  -  09 Oct 2024 07:00  --------------------------------------------------------  IN: 0 mL / OUT: 800 mL / NET: -800 mL        PHYSICAL EXAM:  Vital Signs Last 24 Hrs  T(C): 37.1 (09 Oct 2024 04:03), Max: 37.1 (08 Oct 2024 11:30)  T(F): 98.8 (09 Oct 2024 04:03), Max: 98.8 (09 Oct 2024 04:03)  HR: 79 (09 Oct 2024 04:03) (74 - 84)  BP: 149/90 (09 Oct 2024 04:03) (110/66 - 149/90)  BP(mean): --  RR: 18 (09 Oct 2024 04:03) (18 - 18)  SpO2: 98% (09 Oct 2024 04:03) (94% - 99%)    Parameters below as of 09 Oct 2024 04:03  Patient On (Oxygen Delivery Method): nasal cannula  O2 Flow (L/min): 4      CONSTITUTIONAL: NAD, well-developed  RESPIRATORY: Normal respiratory effort; lungs are clear to auscultation bilaterally  CARDIOVASCULAR: Regular rate and rhythm, normal S1 and S2, no murmur/rub/gallop; Peripheral pulses are 2+ bilaterally  ABDOMEN: Nontender to palpation, normoactive bowel sounds, no rebound/guarding  MUSCLOSKELETAL:  Moving all limbs spontaneously; No lower extremity edema  PSYCH: Affect appropriate    LABS:                        9.6    6.01  )-----------( 268      ( 09 Oct 2024 05:51 )             30.6     10-09    140  |  97  |  7   ----------------------------<  123[H]  3.4[L]   |  28  |  0.77    Ca    9.6      09 Oct 2024 05:52  Phos  3.9     10-09  Mg     1.9     10-09    TPro  7.1  /  Alb  3.5  /  TBili  0.6  /  DBili  x   /  AST  10  /  ALT  7[L]  /  AlkPhos  99  10-09    PTT - ( 08 Oct 2024 18:05 )  PTT:68.2 sec        MICRO:  Urinalysis Basic - ( 09 Oct 2024 05:52 )    Color: x / Appearance: x / SG: x / pH: x  Gluc: 123 mg/dL / Ketone: x  / Bili: x / Urobili: x   Blood: x / Protein: x / Nitrite: x   Leuk Esterase: x / RBC: x / WBC x   Sq Epi: x / Non Sq Epi: x / Bacteria: x          IMAGING: PROGRESS NOTE:   Authored by: Stephanie Pedroza MD  PGY-1    Patient is a 69y old  Male who presents with a chief complaint of Pulmonary Embolism (08 Oct 2024 13:04)    SUBJECTIVE / OVERNIGHT EVENTS:  Pt doing well this morning with no complaints. See MRI findings below.    MEDICATIONS  (STANDING):  aMIOdarone    Tablet 200 milliGRAM(s) Oral daily  amLODIPine   Tablet 10 milliGRAM(s) Oral daily  apixaban 10 milliGRAM(s) Oral every 12 hours  dextrose 5%. 1000 milliLiter(s) (100 mL/Hr) IV Continuous <Continuous>  dextrose 5%. 1000 milliLiter(s) (50 mL/Hr) IV Continuous <Continuous>  dextrose 50% Injectable 12.5 Gram(s) IV Push once  dextrose 50% Injectable 25 Gram(s) IV Push once  dextrose 50% Injectable 25 Gram(s) IV Push once  dextrose Oral Gel 15 Gram(s) Oral once  fluticasone propionate/ salmeterol 250-50 MICROgram(s) Diskus 1 Dose(s) Inhalation two times a day  glucagon  Injectable 1 milliGRAM(s) IntraMuscular once  insulin lispro (ADMELOG) corrective regimen sliding scale   SubCutaneous three times a day before meals  insulin lispro (ADMELOG) corrective regimen sliding scale   SubCutaneous at bedtime  metoprolol tartrate 25 milliGRAM(s) Oral two times a day  pantoprazole  Injectable 40 milliGRAM(s) IV Push daily  piperacillin/tazobactam IVPB.. 3.375 Gram(s) IV Intermittent every 8 hours  polyethylene glycol 3350 17 Gram(s) Oral daily  potassium chloride   Powder 40 milliEquivalent(s) Enteral Tube every 4 hours  sodium chloride 3%  Inhalation 4 milliLiter(s) Inhalation every 12 hours  thiamine 100 milliGRAM(s) Oral daily    MEDICATIONS  (PRN):  albuterol/ipratropium for Nebulization 3 milliLiter(s) Nebulizer every 6 hours PRN Shortness of Breath and/or Wheezing      CAPILLARY BLOOD GLUCOSE      POCT Blood Glucose.: 129 mg/dL (09 Oct 2024 05:53)  POCT Blood Glucose.: 126 mg/dL (09 Oct 2024 00:30)  POCT Blood Glucose.: 127 mg/dL (08 Oct 2024 22:13)  POCT Blood Glucose.: 109 mg/dL (08 Oct 2024 17:58)  POCT Blood Glucose.: 122 mg/dL (08 Oct 2024 11:14)    I&O's Summary    08 Oct 2024 07:01  -  09 Oct 2024 07:00  --------------------------------------------------------  IN: 0 mL / OUT: 800 mL / NET: -800 mL        PHYSICAL EXAM:  Vital Signs Last 24 Hrs  T(C): 37.1 (09 Oct 2024 04:03), Max: 37.1 (08 Oct 2024 11:30)  T(F): 98.8 (09 Oct 2024 04:03), Max: 98.8 (09 Oct 2024 04:03)  HR: 79 (09 Oct 2024 04:03) (74 - 84)  BP: 149/90 (09 Oct 2024 04:03) (110/66 - 149/90)  BP(mean): --  RR: 18 (09 Oct 2024 04:03) (18 - 18)  SpO2: 98% (09 Oct 2024 04:03) (94% - 99%)    Parameters below as of 09 Oct 2024 04:03  Patient On (Oxygen Delivery Method): nasal cannula  O2 Flow (L/min): 4    CONSTITUTIONAL: NAD, AOx1  RESPIRATORY: Normal respiratory effort; lungs are clear to auscultation bilaterally  CARDIOVASCULAR: Regular rate and rhythm, normal S1 and S2, no murmur/rub/gallop; Peripheral pulses are 2+ bilaterally  ABDOMEN: Nontender to palpation, normoactive bowel sounds, no rebound/guarding  MUSCLOSKELETAL:  Moving all limbs spontaneously; No lower extremity edema    LABS:                        9.6    6.01  )-----------( 268      ( 09 Oct 2024 05:51 )             30.6     10-09    140  |  97  |  7   ----------------------------<  123[H]  3.4[L]   |  28  |  0.77    Ca    9.6      09 Oct 2024 05:52  Phos  3.9     10-09  Mg     1.9     10-09    TPro  7.1  /  Alb  3.5  /  TBili  0.6  /  DBili  x   /  AST  10  /  ALT  7[L]  /  AlkPhos  99  10-09    PTT - ( 08 Oct 2024 18:05 )  PTT:68.2 sec        MICRO:  Urinalysis Basic - ( 09 Oct 2024 05:52 )    Color: x / Appearance: x / SG: x / pH: x  Gluc: 123 mg/dL / Ketone: x  / Bili: x / Urobili: x   Blood: x / Protein: x / Nitrite: x   Leuk Esterase: x / RBC: x / WBC x   Sq Epi: x / Non Sq Epi: x / Bacteria: x          IMAGING: < from: MR Head w/wo IV Cont (10.08.24 @ 21:42) >  IMPRESSION: Small acute/subacute infarcts within the right cerebellar   hemisphere and left posterior centrum semiovale with associated cytotoxic   edema.    No acute intracranial hemorrhage.    Multiple patchy confluent nonspecific abnormal white matter foci of   T2/FLAIR prolongation statistically favoring microvascular type changes.    Additional small area of encephalomalacia and gliosis within the left   basal ganglia which may related to chronic infarction and/or hemorrhage.    < end of copied text >

## 2024-10-09 NOTE — PROGRESS NOTE ADULT - PROBLEM SELECTOR PLAN 2
- Hospital course at [facility] c/b AHRF  - S/p intubation on 9/18 for likely benzodiazepine overdose, extubated on 9/19  - Diffuse rhonchi with copious secretions on exam  - CTA chest (10/4) -> Large PE of R main pulmonary artery, scattered GGOs and interstitial changes predominantly in R and L lower lobes  - Likely PE +/- superimposed aspiration PNA  > C/w management of PE, as above  > C/w empiric Zosyn D1:10/5  > C/w oxygen supplementation prn  -wean as tolerated

## 2024-10-09 NOTE — PROGRESS NOTE ADULT - PROBLEM SELECTOR PLAN 7
> LDSS ACHS  -a1c 6.1 - Significant dysphagia s/p extubation on 9/19  - Patient declined PEG tube, NG tube placed instead, pt pulled out, now replaced  > S&S eval ordered  > 10/8 MRI brain: Small acute/subacute infarcts within the right cerebellar   hemisphere and left posterior centrum semiovale with associated cytotoxic   edema.

## 2024-10-09 NOTE — CONSULT NOTE ADULT - ASSESSMENT
INCOMPLETE    Recent vitals notable for: ***. Labs notable for: ***. CT imaging disclosed: ***. Exam notable for ***.     NIHSS: ***  LKN: ***  pre-MRS: ***    Impression: Incidental infarcts in the right cerebellum and left centrum semiovale. Mechanism cardioembolic d/t atrial flutter.    Recommendations:  [] Dysphagia screen already performed - on tube feeds via NGT  [] SBP goal - OK for normotension from neurovascular perspective (BP goal <130/80)  [] Continue apixaban for anticoagulation lifelong for atrial flutter - currently on loading dose 10mg BID for VTE, he can continue 5mg BID for secondary stroke prevention when dose is reduced  [] Regarding initiation of statin - his new infarcts are embolic in nature (statin not indicated), however the ?chronic infarct on MRI appears  in nature (atherosclerotic) - therefore, target LDL <70 for secondary stroke prevention  [] CT angiogram of the head/neck w/IV contrast can be performed on a nonurgent basis to complete stroke workup but unlikely to change stroke management in this patient  [] A1c 6.1 (10/5/2024), lipid panel ordered  [] TTE (10/5/2024): LVEF 60-65%, no RWMA, normal left atrium, intact interatrial septum  [] Vitals ordered q4h per primary, likely does not need routine neurochecks at this time  [] Fall and aspiration precautions  [] PT/OT/SLP/CM as appropriate  [] DVT prophylaxis: therapeutic AC  [] Stroke education provided  [] Smoking cessation education not needed - non-smoker ***  [] Please document MRS on discharge    NO tenecteplase d/t outside therapeutic window.  NO thrombectomy d/t no identified ELVO, outside therapeutic window.    To be seen by attending in the AM with attestation to follow. Formalized plan per attending attestation. Jarrett Overton is a 69-year-old PMH of HTN, T2DM, ?stroke, atrial flutter, CAD s/p CABG x3 (~2018), and alcohol use disorder who initially presented to [facility] on  for a mechanical fall after he tripped and fell onto his R shoulder, denied any headstrike or LOC, subsequent XR showing R proximal humerus fracture with planned ORIF with Orthopedic Surgery. Course was c/b alcohol withdrawal and was started on CIWA protocol. On , RRT was called for AMS and patient was found to be in acute hypercapnic respiratory failure iso prior Librium dose. Patient was given flumazenil and then intubated and transferred to the MICU. ORIF of R humerus was cancelled and CT head showed signs of subacute and chronic subdural hematomas. Patient was extubated on  and repeat CT head showed stable hematomas. Following extubation, patient suffered from significant dysphagia but patient declined PEG tube offered by GI and instead NG tube was placed. On 10/4, patient was found to be hypoxic with subsequent CTA chest showing large PE of R main pulmonary artery. US of b/l LEs was further significant for b/l DVTs in the R peroneal vein and L popliteal, tibial trunk, peroneal, and posterior tibial veins. Patient was transferred to [facility] for further management of PE and embolectomy evaluation."    Given history of subacute vs. chronic subdural hematomas, patient underwent CTH on 10/7/2024 - exam degraded by motion at the vertex. Primary team d/w NSGY - they were OK with initiating heparin gtt. Patient now transitioned to apixaban 10mg BID for VTE. Underwent MRI brain on 10/8/2024, given ongoing dysphagia since extubation on 2024. Punctate infarcts seen in the right cerebellum and left centrum semiovale. Vascular Neurology is consulted for MRI findings.      Recent vitals notable for: afebrile, HR 70s-80s, BP to 149/80, RR 18, SpO2 94-98% on 4L O2 via NC. Labs notable for: Hg 9.6, potassium 3.4, glucose 136, A1c 6.1. MRI brain w/wo with punctate acute/subacute infarcts in the R cerebellum and left centrum semiovale, possible chronic infarct in the left basal ganglia, and white matter hyperintensities of presumed vascular origin. Exam notable for chronically ill-appearing man with NGT in place, RUE immobilized in sling, there is no difficulty with prolonged upgaze, no eyelid ptosis, facial strength intact without asymmetry b/l - no eyelid closure weakness, no air escape when puffed cheeks are pushed in, +hypophonia, no drift in his extremities (other than RUE which has a proximal humeral fracture).    NIHSS: 4 (+1 questions, +3 RUE)  LKN: Unknown  pre-MRS: estimated at 1    Impression:   1) Incidental infarcts in the right cerebellum and left centrum semiovale. Mechanism cardioembolic d/t atrial flutter.  2) Isolated dysphagia/dysphonia - MRI brain w/wo unrevealing with regard to etiology. Exam is not c/w myasthenia gravis.    Recommendations:  [] Dysphagia screen already performed - on tube feeds via NGT  [] SBP goal - OK for normotension from neurovascular perspective (BP goal <130/80)  [] Continue apixaban for anticoagulation lifelong for atrial flutter - currently on loading dose 10mg BID for VTE, he can continue 5mg BID for secondary stroke prevention when dose is reduced  [] Regarding initiation of statin - his new infarcts are embolic in nature (statin not indicated), however the ?chronic infarct on MRI appears  in nature (atherosclerotic) - therefore, target LDL <70 for secondary stroke prevention  [] CT angiogram of the head/neck w/IV contrast can be performed on a nonurgent basis to complete stroke workup but unlikely to change stroke management in this patient  [] A1c 6.1 (10/5/2024), lipid panel ordered  [] TTE (10/5/2024): LVEF 60-65%, no RWMA, normal left atrium, intact interatrial septum  [] Vitals ordered q4h per primary, likely does not need routine neurochecks at this time  [] Fall and aspiration precautions  [] PT/OT/SLP/CM as appropriate  [] DVT prophylaxis: therapeutic AC  [] Stroke education provided  [] Smoking cessation education not needed - non-smoker ***  [] Please document MRS on discharge  -----------------------------------  [] Please continue high-dose thiamine supplementation  [] Regarding dysphagia/dysphonia:  - Needs ongoing multidisciplinary workup. F/u SLP recommendations. Consider ENT and GI evaluations.  - From neurologic perspective:  1) He has had MRI brain w/wo - no explanation for patient's symptoms  2) Can obtain myasthenia gravis labs although very low clinical suspicion: acetylcholine blocking antibody, acetylcholine modulating antibody, acetylcholine receptor binding antibody, LRP4 autoantibody test, MuSK antibody test  3) If not other explanation is found for symptoms: follow up outpatient with Neurology for further evaluation including possible EMG  [] Upon discharge, he should follow up with Dr. Akbar Elena (Vascular Neurology): [address], [place] [address]. [phone].    NO tenecteplase d/t outside therapeutic window.  NO thrombectomy d/t no identified ELVO, outside therapeutic window.    To be seen by attending in the AM with attestation to follow. Formalized plan per attending attestation. Jarrett Overton is a 69-year-old PMH of HTN, T2DM, ?stroke, atrial flutter, CAD s/p CABG x3 (~2018), and alcohol use disorder who initially presented to [facility] on  for a mechanical fall after he tripped and fell onto his R shoulder. He was diagnosed with R proximal humerus fracture with planned ORIF with Orthopedic Surgery. Patient's course was c/b alcohol w/d and he was started on CIWA protocol with chlordiazepoxide. Patient experienced acute hypercapnic RF - thought to be d/t benzodiazepine use. Was given flumazenil and was intubated. Transferred to MICU. His ORIF was cancelled d/t CT head with evidence of subacute to chronic SDHs. Extubated  - with ongoing dysphagia since. He refused PEG placement - NGT in place. On 10/4 - he was found to be hypoxic with CTA chest showing large PE of the R main PA. US of the LEs disclosed multiple b/l DVTs. Transferred to [facility] for further management of this PE. Given history of subacute vs. chronic subdural hematomas, patient underwent CTH on 10/7/2024 - exam degraded by motion at the vertex. Primary team d/w NSGY - they were OK with initiating heparin gtt. Patient now transitioned to apixaban 10mg BID for VTE. Underwent MRI brain on 10/8/2024, given ongoing dysphagia since extubation on 2024. Punctate infarcts seen in the right cerebellum and left centrum semiovale. Additionally, patient underwent swallow bedside assessment on 10/9 - he has prolonged bolus manipulation, prolonged bolus transport, and there is suspicion for laryngeal penetration/aspiration. Due for FEES. Vascular Neurology is consulted for MRI findings.    Recent vitals notable for: afebrile, HR 70s-80s, BP to 149/80, RR 18, SpO2 94-98% on 4L O2 via NC. Labs notable for: Hg 9.6, potassium 3.4, glucose 136, A1c 6.1. MRI brain w/wo with punctate acute/subacute infarcts in the R cerebellum and left centrum semiovale, possible chronic infarct in the left basal ganglia, and white matter hyperintensities of presumed vascular origin. Exam notable for chronically ill-appearing man with NGT in place, RUE immobilized in sling, there is no difficulty with prolonged upgaze, no eyelid ptosis, facial strength intact without asymmetry b/l - no eyelid closure weakness, no air escape when puffed cheeks are pushed in, +hypophonia, no drift in his extremities (other than RUE which has a proximal humeral fracture).    NIHSS: 4 (+1 questions, +3 RUE)  LKN: Unknown  pre-MRS: estimated at least 1    Impression:   1) Incidental infarcts in the right cerebellum and left centrum semiovale. Mechanism cardioembolic d/t atrial flutter.  2) Isolated dysphagia/dysphonia - MRI brain w/wo unrevealing with regard to etiology. Exam is not c/w myasthenia gravis.    Recommendations:  [] Dysphagia screen already performed - on tube feeds via NGT  [] SBP goal - OK for normotension from neurovascular perspective (BP goal <130/80)  [] Continue apixaban for lifelong anticoagulation for atrial flutter - currently on loading dose 10mg BID for VTE, he can continue 5mg BID for secondary stroke prevention when dose is reduced  [] Regarding initiation of statin - his new infarcts are embolic in nature (statin not indicated), however the ?chronic infarct on MRI appears  in nature (atherosclerotic) - therefore, target LDL <70 for secondary stroke prevention  [] CT angiogram of the head/neck w/IV contrast can be performed on a nonurgent basis to complete stroke workup but unlikely to change stroke management in this patient  [] A1c 6.1 (10/5/2024), lipid panel ordered  [] TTE (10/5/2024): LVEF 60-65%, no RWMA, normal left atrium, intact interatrial septum  [] Vitals ordered q4h per primary, likely does not need routine neurochecks at this time (from neurovascular perspective) given infarcts are asymptomatic  [] Fall and aspiration precautions  [] PT/OT  [] DVT prophylaxis: therapeutic AC  [] Stroke education provided  [] Smoking cessation education provided  [] Please document MRS on discharge  -----------------------------------  [] Please continue high-dose thiamine supplementation  [] Regarding dysphagia/dysphonia:  - Needs ongoing multidisciplinary workup. F/u SLP recommendations. Consider ENT and GI evaluations.  - From neurologic perspective:  1) He has had MRI brain w/wo - no explanation for patient's symptoms  2) Can obtain myasthenia gravis labs although very low clinical suspicion: acetylcholine blocking antibody, acetylcholine modulating antibody, acetylcholine receptor binding antibody, LRP4 autoantibody test, MuSK antibody test  3) If no other explanation is found for symptoms: follow up outpatient with Neurology for further evaluation including possible EMG  [] Upon discharge, he should follow up with Dr. Akbar Elena (Vascular Neurology): [address], [place] [address]. [phone].    NO tenecteplase d/t outside therapeutic window.  NO thrombectomy d/t no identified ELVO (exam not suggestive), outside therapeutic window.    To be seen by Dr. Elena in the AM with attestation to follow. Formalized plan per attending attestation.

## 2024-10-09 NOTE — SWALLOW BEDSIDE ASSESSMENT ADULT - SWALLOW EVAL: DIAGNOSIS
69Y w/ PMH of CVA, s/p CABG x3 (~2018), alcohol use disorder, s/p mechanical fall x-natividad from [facility] w/ known dysphagia declining PEG tube. MRI revealing small acute/subacute infarcts within the right cerebellar 69Y w/ PMH of CVA, s/p CABG x3, alcohol use disorder, s/p mechanical fall x-natividad from [facility] w/ known dysphagia declining PEG tube. MRI revealing small acute/subacute infarcts within the right cerebellar. Patient presenting with evidence of an segun-pharyngeal dysphagia superimposed upon by cognitive deficits and reduced secretion management. Pt w/ wet/gurgly vocal vocal quality upon encounter. Pt able to expectorate material which was removed with Yankauer and vocal quality significantly improved. Oral phase notable for prolonged bolus manipulation and transport. Pharyngeal phase notable for delayed initiation of pharyngeal swallow trigger. Pt exhibited multiple swallows (7-8) for 1x tsp puree suggesting pharyngeal residue with wet/gurgly vocal quality afterwards suggesting laryngeal penetration/aspiration. Baseline vocal quality achieved s/p additional swallows. No further subjective PO trials provided.

## 2024-10-09 NOTE — SWALLOW BEDSIDE ASSESSMENT ADULT - COMMENTS
10/5 Cardiology consulted- pt w/ elevated troponin but no documented evidence of RV strain; TTE and MRI recommended. 10/6 pt restless pulling at IV, NC and self-removed NGT. Several reattempts to replace however much resistance in each nostril. Pt put in restraints. Neurology vs nsgy eval given c/f intracranial process and to discuss safety of AC. 10/7 pt endorsing hunger in AM,  Swallow hx: As per chart review, pt evaluated by S+S at [facility] found dysphagia on video fluoroscopy to all textures and recommended for PEG tube, but pt refused and opted for NGT placement (remains in place). 10/5 Cardiology consulted- pt w/ elevated troponin but no documented evidence of RV strain; TTE and MRI recommended. 10/6 pt restless pulling at IV, NC and self-removed NGT. Several reattempts to replace however much resistance in each nostril. Pt put in restraints. Neurology vs nsgy eval given c/f intracranial process and to discuss safety of AC. 10/7 pt endorsing hunger in AM. ENT consulted and successfully placement NGT through left-nare w/ recommendations for extra saline flush s/p medication administration to prevent clogging of NGT. 10/8 orthopedic surgery recommended outpatient surgery for proximal humeral fracture.     Imaging:  MRI- Small acute/subacute infarcts within the right cerebellar hemisphere and left posterior centrum semiovale with associated cytotoxic edema. No acute intracranial hemorrhage. Multiple patchy confluent nonspecific abnormal white matter foci of T2/FLAIR prolongation statistically favoring microvascular type changes. Additional small area of encephalomalacia and gliosis within the left basal ganglia which may related to chronic infarction and/or hemorrhage.  CXR- Bibasilar hazy opacities, left greater than right may represent atelectasis versus pneumonia.    Swallow hx: As per chart review, pt evaluated by S+S at [facility] found dysphagia on video fluoroscopy to all textures and recommended for PEG tube, but pt refused and opted for NGT placement (remains in place).

## 2024-10-09 NOTE — PROGRESS NOTE ADULT - PROBLEM SELECTOR PLAN 3
- US of b/l LEs at [facility] -> R peroneal vein and L popliteal, tibial trunk, peroneal, and posterior tibial DVTs  > Repeat US of b/l LEs showing mulitple DVTs  > C/w heparin gtt. then eliquis, as above - US of b/l LEs at [facility] -> R peroneal vein and L popliteal, tibial trunk, peroneal, and posterior tibial DVTs  > Repeat US of b/l LEs showing mulitple DVTs  > C/w eliquis, as above

## 2024-10-09 NOTE — SWALLOW BEDSIDE ASSESSMENT ADULT - PHARYNGEAL PHASE
Delayed initiation of pharyngeal swallow trigger. Pt presented with multiple swallows (7-8) for 1 tsp of purees suggesting pharyngeal residue. Pt with wet/gurgly vocal quality afterwards suggesting laryngeal penetration/aspiration. Pt denies any globus sensation or material remaining after the swallows.

## 2024-10-09 NOTE — SWALLOW BEDSIDE ASSESSMENT ADULT - SLP GENERAL OBSERVATIONS
Patient encountered semi-supine in bed, on 2L NC, A&Ox2, +IV, b/l mittens, +NGT, +hypophonic, sling for R-sided humeral fracture.

## 2024-10-09 NOTE — CONSULT NOTE ADULT - ATTENDING COMMENTS
DOS 10/10  Briefly    69-year-old PMH of HTN, T2DM, ?stroke, atrial flutter, CAD s/p CABG x3 (~2018), and alcohol use disorder who initially presented to [facility] on 9/14 for a mechanical fall after he tripped and fell onto his R shoulder. He was diagnosed with R proximal humerus fracture with planned ORIF with Orthopedic Surgery. Patient's course was c/b alcohol w/d and he was started on CIWA protocol with chlordiazepoxide. Patient experienced acute hypercapnic RF - thought to be d/t benzodiazepine use. Was given flumazenil and was intubated. Transferred to MICU. His ORIF was cancelled d/t CT head with evidence of subacute to chronic SDHs. Extubated 9/19 - with ongoing dysphagia since. He refused PEG placement - NGT in place. On 10/4 - he was found to be hypoxic with CTA chest showing large PE of the R main PA. US of the LEs disclosed multiple b/l DVTs. Transferred to [facility] for further management of this PE. Given history of subacute vs. chronic subdural hematomas, patient underwent CTH on 10/7/2024 - exam degraded by motion at the vertex. Primary team d/w NSGY - they were OK with initiating heparin gtt. Patient now transitioned to apixaban 10mg BID for VTE. Underwent MRI brain on 10/8/2024, given ongoing dysphagia since extubation on 9/19/2024. Punctate infarcts seen in the right cerebellum and left centrum semiovale. Additionally, patient underwent swallow bedside assessment on 10/9 - he has prolonged bolus manipulation, prolonged bolus transport, and there is suspicion for laryngeal penetration/aspiration. Due for FEES. Vascular Neurology is consulted for MRI findings.    Recent vitals notable for: afebrile, HR 70s-80s, BP to 149/80, RR 18, SpO2 94-98% on 4L O2 via NC. Labs notable for: Hg 9.6, potassium 3.4, glucose 136, A1c 6.1. MRI brain w/wo with punctate acute/subacute infarcts in the R cerebellum and left centrum semiovale, possible chronic infarct in the left basal ganglia, and white matter hyperintensities of presumed vascular origin. Exam notable for chronically ill-appearing man with NGT in place, RUE immobilized in sling, there is no difficulty with prolonged upgaze, no eyelid ptosis, facial strength intact without asymmetry b/l - no eyelid closure weakness, no air escape when puffed cheeks are pushed in, +hypophonia, no drift in his extremities (other than RUE which has a proximal humeral fracture).  NIHSS: 4 (+1 questions, +3 RUE)  LKN: Unknown  pre-MRS: estimated at least 1  MRI brain w/wo with punctate acute/subacute infarcts in the R cerebellum and left centrum semiovale, possible chronic infarct in the left basal ganglia, and white matter hyperintensities  A1c 6.1 (10/5/2024)  TTE (10/5/2024): LVEF 60-65%, no RWMA, normal left atrium, intact interatrial septum    Impression:   1) Incidental infarcts in the right cerebellum and left centrum semiovale. Mechanism cardioembolic d/t atrial flutter.  2) Isolated dysphagia/dysphonia - MRI brain w/wo unrevealing with regard to etiology. Exam is not c/w myasthenia gravis.  3) mechanical fall   4) h/o SDH   5) ETOH abuse     Recommendations:  - Dysphagia screen already performed - on tube feeds via NGT  - SBP goal - OK for normotension from neurovascular perspective (BP goal <130/80)  - Continue apixaban for lifelong anticoagulation for atrial flutter - currently on loading dose 10mg BID for VTE, he can continue 5mg BID for secondary stroke prevention when dose is reduced  - Regarding initiation of statin -  would start atorvastatin 40mg QHS if no objection, target LDL <70 for secondary stroke prevention  - CT angiogram of the head/neck w/IV contrast can be performed on a nonurgent basis to complete stroke workup but unlikely to change stroke management in this patient  - lipid panel ordered  - Vitals ordered q4h per primary, likely does not need routine neurochecks at this time (from neurovascular perspective) given infarcts are asymptomatic  - Fall and aspiration precautions  - PT/OT  - DVT prophylaxis: therapeutic AC  - Stroke education provided  - Smoking cessation education provided  - Please document MRS on discharge  - for ETOH h/o would c/w thimiamine.  folic acid   - Regarding dysphagia/dysphonia:  Needs ongoing multidisciplinary workup. F/u SLP recommendations. Consider ENT and GI evaluations; from neuro standpoint no etiology for dysphagia.  Can obtain myasthenia gravis labs although very low clinical suspicion: acetylcholine blocking antibody, acetylcholine modulating antibody, acetylcholine receptor binding antibody, LRP4 autoantibody test, MuSK antibody test  - If no other explanation is found for symptoms: follow up outpatient with Neurology for further evaluation including possible EMG  - Upon discharge, he should follow up with Dr. Akbar Elena (Vascular Neurology): [address], [place] [address]. [phone].    Akbar Elena MD  Vascular Neurology  Office: [phone]

## 2024-10-09 NOTE — PROGRESS NOTE ADULT - PROBLEM SELECTOR PLAN 8
- Significant dysphagia s/p extubation on 9/19  - Patient declined PEG tube, NG tube placed instead, pt pulled out, now replaced  > S&S eval ordered  > MRI brain ordered 10/8 MRI brain: Small acute/subacute infarcts within the right cerebellar   hemisphere and left posterior centrum semiovale with associated cytotoxic   edema  -Neuro consulted  -Lipid panel ordered, consider statin

## 2024-10-09 NOTE — PROGRESS NOTE ADULT - ASSESSMENT
Mr. Jarrett Overton is a 69-year-old w/ PMH of HTN, T2DM, CVA, A. flutter, CAD s/p CABG x3 (~2018), and alcohol use disorder who initially presented to [facility] on 9/14 for a mechanical fall. On 10/4, patient was found to be hypoxic with subsequent CTA chest showing large PE of R main pulmonary artery. US of b/l LEs was further significant for b/l DVTs in the R peroneal vein and L popliteal, tibial trunk, peroneal, and posterior tibial veins. Patient was transferred to [facility] for further management of PE and embolectomy evaluation.

## 2024-10-09 NOTE — PROGRESS NOTE ADULT - PROBLEM SELECTOR PLAN 1
- Hospital course at [facility] c/b AHRF  - CTA chest (10/4) -> Large PE of R main pulmonary artery  - US of b/l LEs -> R peroneal vein and L popliteal, tibial trunk, peroneal, and posterior tibial DVTs  - EKG showing evidence of right heart strain, S1Q3T3 changes, T wave depressions in precordial leads; not present on EKG from 9/15  - TTE (9/15) unremarkable  - Troponin 92, BNP 7168 on transfer  > CTH not acutely concerning  > C/w heparin gtt w/ target PTT-> 0.4-0.6 Repeat TTE not concerning  > Vascular cardiology consulted rec heparin for now and eventual transition to DOAC  - Neurosurg consulted for AC management, they said okay to PTT 60-80  -plan to transition to DOAC today - Hospital course at [facility] c/b AHRF  - CTA chest (10/4) -> Large PE of R main pulmonary artery  - US of b/l LEs -> R peroneal vein and L popliteal, tibial trunk, peroneal, and posterior tibial DVTs  - EKG showing evidence of right heart strain, S1Q3T3 changes, T wave depressions in precordial leads; not present on EKG from 9/15  - TTE (9/15) unremarkable  - Troponin 92, BNP 7168 on transfer  > CTH not acutely concerning  > C/w heparin gtt w/ target PTT-> 0.4-0.6 Repeat TTE not concerning  > Vascular cardiology consulted rec heparin for now and eventual transition to DOAC  - Neurosurg consulted for AC management, they said okay to PTT 60-80  - Transitioned to DOAC loading eliquis 10/8

## 2024-10-09 NOTE — PROGRESS NOTE ADULT - PROBLEM SELECTOR PLAN 4
- Initially presented to [facility] on 9/14 for a mechanical fall after he tripped and fell onto his R shoulder  - XR showing R proximal humerus fracture, ORIF with Orthopedic Surgery at [facility] cancelled due to MICU course  > Orthopedic surgery on 10/7 said do outpt surgery.

## 2024-10-09 NOTE — PROGRESS NOTE ADULT - PROBLEM SELECTOR PLAN 9
DVT Ppx: Heparin gtt. transition to eliquis   Diet: TF  Dispo: TBD > C/w amiodarone 200mg qd, metoprolol tartrate 25mg BID  > C/w telemetry.

## 2024-10-09 NOTE — PROGRESS NOTE ADULT - ATTENDING COMMENTS
Patient is a 69 year old male, with PMH of HTN, DM2, ?CVA/TIA, Aflutter, CAD s/p CABG (2018), EtOH use disorder (two 40oz beers daily), current smoker (8 cigs/day), L total hip arthroplasty, initially admitted to [facility] (9/14/24-10/4/24) after mech fall c/b R prox humeral fx and EtOH withdrawal. Was on CIWA with Librium/Ativan PRN. TTE on 9/15/24 showed LVEF 60-65%, grad I DD, and an aortic valve prosthesis with normal function. Course c/b AMS, acute hypoxemic/hypercapnic respiratory, and agonal breathing on 9/18/24. Was initially given flumazenil x2 with some improvement, but was intubated for airway protection given excessive secretions and transferred to MICU 9/18/24. CTA chest was somewhat limited study but did not find PE at the time, noted RUL GGOs "suspicious for atypical or viral infection." CTH showed subacute and chronic SDHs as well as chronic lacunar infarct within L lentiform nucleus. Repeat CT showed stable SDHs. Extubated on 9/19/24. Pt was downgraded to Medicine floors on 9/21/24. Noted to have loss of voice and dysphagia. Evaluated by S+S, found dysphagia on video flouroscopy to all textures and recommended for PEG tube, but pt refused and opted for NGT placement. Ortho surgery that was planned for humeral fx was cancelled and ultimately recommended for conservative management only. CXR on 10/3/24 showed new LLL infiltrate, started on Zosyn. On 10/4/24, pt desatted on supplemental O2, CTA chest found massive PE in R main bronchus with pulm trunk dilatation. BLE duplex also found b/l LE DVTs (L>R). Started on hep gtt and transferred to [facility] for embolectomy evaluation.    #Acute hypoxemic respiratory failure  #CVA  #R main bronchus PE, b/l LE DVTs  #?PNA  #Dysphagia, hypophonia  #R humeral fx  #Chronic aflutter  #DM2  #EtOH/tobacco use disorder    - Vascular Cardiology following  - transitioned to eliquis 10mg BID for 7 days (including heparin drip days) and then change to 5mg BID. Will need to do price check for eliquis.   - c/w empiric zosyn for now; likely continue for 7 day course   - c/w amio and metoprolol (was on at OSH)  - c/w advair BID and duonebs q6h PRN  - c/w low ISS for now, monitor FS  - monitor on telemetry  - repeat CTH stable  - MRI head done showing acute/subacute infarcts; neuro eval for further recs   - TTE noted with no acute findings   - S+S eval noted; NPO; FEES to be done   - Ortho states no plan for intervention inpatient for humerus fracture; outpatient follow up     - started on NG feeds 10/5 but patient pulled out NGT 10/6 overnight; ENT replaced NGT on 10/7. Resume NGT feeds; dietitian recs noted   - wean O2 as tolerable; currently on 4L NC but saturating well    - VA duplex LE with extensive blood clots bilaterally; no interventions planned as per vascular cardio   - PT eval: ROSALBA     Rest as above. Discussed with HS.

## 2024-10-09 NOTE — CONSULT NOTE ADULT - SUBJECTIVE AND OBJECTIVE BOX
Neurology - Consult Note    -  Spectra: [phone] ([facility]), [phone] ([facility]). For new consults, please page: [phone] ([facility]), [phone] ([facility]).  -    HPI: Patient DENAE GIBBS is a 69y (1955) ***-handed wo/man who presents to *** ED on ***, with c/o ***.    PMH significant for: ***    Review of Systems:  INCOMPLETE   All other review of systems is negative unless indicated above.    Allergies:  No Known Allergies      PMHx/PSHx/Family Hx: As above, otherwise see below   HTN (hypertension)    T2DM (type 2 diabetes mellitus)    CVA (cerebrovascular accident)    Atrial flutter    CAD (coronary artery disease)    History of alcohol use disorder        Social Hx:  Per HPI    Medications:  MEDICATIONS  (STANDING):  aMIOdarone    Tablet 200 milliGRAM(s) Oral daily  amLODIPine   Tablet 10 milliGRAM(s) Oral daily  apixaban 10 milliGRAM(s) Oral every 12 hours  atorvastatin 80 milliGRAM(s) Oral at bedtime  dextrose 5%. 1000 milliLiter(s) (50 mL/Hr) IV Continuous <Continuous>  dextrose 5%. 1000 milliLiter(s) (100 mL/Hr) IV Continuous <Continuous>  dextrose 50% Injectable 12.5 Gram(s) IV Push once  dextrose 50% Injectable 25 Gram(s) IV Push once  dextrose 50% Injectable 25 Gram(s) IV Push once  dextrose Oral Gel 15 Gram(s) Oral once  fluticasone propionate/ salmeterol 250-50 MICROgram(s) Diskus 1 Dose(s) Inhalation two times a day  glucagon  Injectable 1 milliGRAM(s) IntraMuscular once  insulin lispro (ADMELOG) corrective regimen sliding scale   SubCutaneous three times a day before meals  insulin lispro (ADMELOG) corrective regimen sliding scale   SubCutaneous at bedtime  metoprolol tartrate 25 milliGRAM(s) Oral two times a day  pantoprazole  Injectable 40 milliGRAM(s) IV Push daily  piperacillin/tazobactam IVPB.. 3.375 Gram(s) IV Intermittent every 8 hours  polyethylene glycol 3350 17 Gram(s) Oral daily  potassium chloride   Powder 40 milliEquivalent(s) Enteral Tube every 4 hours  sodium chloride 3%  Inhalation 4 milliLiter(s) Inhalation every 12 hours  thiamine 100 milliGRAM(s) Oral daily    MEDICATIONS  (PRN):  albuterol/ipratropium for Nebulization 3 milliLiter(s) Nebulizer every 6 hours PRN Shortness of Breath and/or Wheezing      Vitals:  T(C): 36.9 (10-09-24 @ 11:10), Max: 37.1 (10-09-24 @ 04:03)  HR: 80 (10-09-24 @ 11:10) (74 - 84)  BP: 126/88 (10-09-24 @ 11:10) (110/66 - 149/90)  RR: 18 (10-09-24 @ 11:10) (18 - 18)  SpO2: 98% (10-09-24 @ 11:10) (94% - 99%)    Physical Examination: INCOMPLETE  General - Sitting up on ED cart  Cardiovascular - No LE edema  Eyes - Non-injected conjunctivae, anicteric sclerae    Neurologic Exam:  Mental status:  - Awake, Alert  - Oriented to: person, place, and time  - Speech: fluent  - Repetition and naming: intact   - Follows simple and cross commands   - Attention/concentration: intact  - Recent and remote memory (including registration and recall): registration intact, 3/3 on 3-word recall  - Fund of knowledge: intact    Cranial nerves - PERRL - no rAPD, VFF with finger counting, EOMI - no nystagmus, face sensation (V1-V3) intact b/l, facial strength intact without asymmetry b/l, hearing grossly intact, palate with symmetric elevation, shoulder shrug intact b/l, tongue midline on protrusion with full lateral movement  Dysarthria: ***    Motor - Normal bulk throughout. No pronator drift.  Strength testing (R/L)  Deltoid:  5/5  Biceps:  5/5        Triceps:  5/5       Wrist Extension:  5/5      Wrist Flexion:  5/5       Interossei:  5/5        :  5/5    ***FFM intact   ***No orbiting with forearm rolling    Hip Flexion:  5/5  Hip Extension:  5/5      Knee Flexion:  5/5      Knee Extension:  5/5      Dorsiflexion:  5/5      Plantar Flexion:  5/5    Sensation - Light touch intact throughout    DTRs (R/L)  Biceps:  2+/2+        Triceps:  2+/2+       Brachioradialis:  2+/2+        Patellar:  2+/2+      Ankle:  2+/2+    no ankle clonus  Plantar response:  Down/Down  **Deferred d/t focused neurologic exam    Coordination - FNF, HTS intact b/l. No tremors appreciated.    Gait and station - Unable to assess d/t fall risk/safety concerns.    Labs:                        9.6    6.01  )-----------( 268      ( 09 Oct 2024 05:51 )             30.6     10-09    140  |  97  |  7   ----------------------------<  123[H]  3.4[L]   |  28  |  0.77    Ca    9.6      09 Oct 2024 05:52  Phos  3.9     10-09  Mg     1.9     10-09    TPro  7.1  /  Alb  3.5  /  TBili  0.6  /  DBili  x   /  AST  10  /  ALT  7[L]  /  AlkPhos  99  10-09    CAPILLARY BLOOD GLUCOSE      POCT Blood Glucose.: 136 mg/dL (09 Oct 2024 11:52)    LIVER FUNCTIONS - ( 09 Oct 2024 05:52 )  Alb: 3.5 g/dL / Pro: 7.1 g/dL / ALK PHOS: 99 U/L / ALT: 7 U/L / AST: 10 U/L / GGT: x             PTT - ( 08 Oct 2024 18:05 )  PTT:68.2 sec  CSF:                  Radiology:  MR BRAIN WAW IC    PROCEDURE DATE:  10/08/2024      INTERPRETATION:  .    CLINICAL INFORMATION: Dysphagia. Subacute on chronic subdural hematomas   on outside head CT.    TECHNIQUE: Multiplanar multisequential MRI of the brain was acquired with   and without the administration of IV gadolinium. 7.5 cc's of IV Gadavist   was administered for the purposes of this examination. 0 cc were   discarded.    COMPARISON: Prior CT study of the head from 10/7/2024. Outside CT study   of the head dated 9/19/2024.    FINDINGS: There is a small focus of diffusion restriction within the   right cerebellar hemisphere (series 5, image 38). An additional small   focus of diffusion reduction is seen within the left posterior centrum   semiovale (series 5, image 54). Corresponding areas of T2/FLAIR   hyperintense signal change are seen in these areas.    A small area of encephalomalacia and gliosis is seen within the left   basal ganglia.    Multiple additional patchy confluent nonspecific T2/FLAIR hyperintense   signal changes are noted throughout the bihemispheric white matter   without associated mass effect or restricted diffusion.    There is no abnormal brain parenchymal or leptomeningeal enhancement.    No hydrocephalus is seen. There are no abnormal extra-axial fluid   collections. Flow-voids are noted throughout the major intracranial   vessels, on the T2 weighted images, consistent with their patency. The   sella turcica and posterior fossa are unremarkable.    Mild scattered mucosal thickening is seen throughout the paranasal   sinuses. The bilateral tympanomastoid cavities are clear. The calvarium   appears intact. The orbits appear unremarkable apart from a disconjugate   gaze.    IMPRESSION: Small acute/subacute infarcts within the right cerebellar   hemisphere and left posterior centrum semiovale with associated cytotoxic   edema.    No acute intracranial hemorrhage.    Multiple patchy confluent nonspecific abnormal white matter foci of   T2/FLAIR prolongation statistically favoring microvascular type changes.    Additional small area of encephalomalacia and gliosis within the left   basal ganglia which may related to chronic infarction and/or hemorrhage.     Neurology - Consult Note    -  Spectra: [phone] ([facility]), [phone] ([facility]). For new consults, please page: [phone] ([facility]), [phone] ([facility]).  -    HPI: Patient DENAE GIBBS is a 69y (1955) RIGHT-handed man who is currently admitted to [facility] since 10/4/2024, as a transfer from [facility], for management of large PE.     Per H&P:  "Mr. Denae Gibbs is a 69-year-old w/ PMH of HTN, T2DM, CVA, A. flutter, CAD s/p CABG x3 (~2018), and alcohol use disorder who initially presented to [facility] on 9/14 for a mechanical fall after he tripped and fell onto his R shoulder, denied any headstrike or LOC, subsequent XR showing R proximal humerus fracture with planned ORIF with Orthopedic Surgery. Course was c/b alcohol withdrawal and was started on CIWA protocol. On 9/18, RRT was called for AMS and patient was found to be in acute hypercapnic respiratory failure iso prior Librium dose. Patient was given flumazenil and then intubated and transferred to the MICU. ORIF of R humerus was cancelled and CT head showed signs of subacute and chronic subdural hematomas. Patient was extubated on 9/19 and repeat CT head showed stable hematomas. Following extubation, patient suffered from significant dysphagia but patient declined PEG tube offered by GI and instead NG tube was placed. On 10/4, patient was found to be hypoxic with subsequent CTA chest showing large PE of R main pulmonary artery. US of b/l LEs was further significant for b/l DVTs in the R peroneal vein and L popliteal, tibial trunk, peroneal, and posterior tibial veins. Patient was transferred to [facility] for further management of PE and embolectomy evaluation."    Given history of subacute vs. chronic subdural hematomas, patient underwent CTH on 10/7/2024 - exam degraded by motion at the vertex. Primary team d/w NSGY - they were OK with initiating heparin gtt. Patient now transitioned to apixaban 10mg BID for VTE. Underwent MRI brain on 10/8/2024, given ongoing dysphagia since extubation on 9/19/2024. Punctate infarcts seen in the right cerebellum and left centrum semiovale. Vascular Neurology is consulted for MRI findings.    Patient interview:  On arrival to patient's room - he is getting fluids pushed down his NGT. Extremely hypophonic. Can speak, but difficult to understand. Able to shake head "yes" and "no."    Unclear based on patient's response if he was previously taking anticoagulation for atrial flutter. On anesthesia pre-op documentation from 2023 - patient apparently taking aspirin 81mg daily, but no AC listed. Notes that he has smoked 1 PPD x 30 years (patient now reporting he only smokes "a little") and "1 big bottle beer per day."    Denies trouble swallowing in the past. Denies history of neurologic issues. Denies family history of neurologic issues. Says he had a stroke involving his R eye many years ago - says he lost vision in the temporal field of his right eye (points to this area - not able to verbalize complete history). Denies use of AD at baseline. Climbs stairs. Does not drive. Denies recreational drug use. 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    Review of Systems:  All other review of systems is negative unless indicated above.    Allergies:  No Known Allergies      PMHx/PSHx/Family Hx: As above, otherwise see below   HTN (hypertension)    T2DM (type 2 diabetes mellitus)    CVA (cerebrovascular accident)    Atrial flutter    CAD (coronary artery disease)    History of alcohol use disorder        Social Hx:  Per HPI    Medications:  MEDICATIONS  (STANDING):  aMIOdarone    Tablet 200 milliGRAM(s) Oral daily  amLODIPine   Tablet 10 milliGRAM(s) Oral daily  apixaban 10 milliGRAM(s) Oral every 12 hours  atorvastatin 80 milliGRAM(s) Oral at bedtime  dextrose 5%. 1000 milliLiter(s) (50 mL/Hr) IV Continuous <Continuous>  dextrose 5%. 1000 milliLiter(s) (100 mL/Hr) IV Continuous <Continuous>  dextrose 50% Injectable 12.5 Gram(s) IV Push once  dextrose 50% Injectable 25 Gram(s) IV Push once  dextrose 50% Injectable 25 Gram(s) IV Push once  dextrose Oral Gel 15 Gram(s) Oral once  fluticasone propionate/ salmeterol 250-50 MICROgram(s) Diskus 1 Dose(s) Inhalation two times a day  glucagon  Injectable 1 milliGRAM(s) IntraMuscular once  insulin lispro (ADMELOG) corrective regimen sliding scale   SubCutaneous three times a day before meals  insulin lispro (ADMELOG) corrective regimen sliding scale   SubCutaneous at bedtime  metoprolol tartrate 25 milliGRAM(s) Oral two times a day  pantoprazole  Injectable 40 milliGRAM(s) IV Push daily  piperacillin/tazobactam IVPB.. 3.375 Gram(s) IV Intermittent every 8 hours  polyethylene glycol 3350 17 Gram(s) Oral daily  potassium chloride   Powder 40 milliEquivalent(s) Enteral Tube every 4 hours  sodium chloride 3%  Inhalation 4 milliLiter(s) Inhalation every 12 hours  thiamine 100 milliGRAM(s) Oral daily    MEDICATIONS  (PRN):  albuterol/ipratropium for Nebulization 3 milliLiter(s) Nebulizer every 6 hours PRN Shortness of Breath and/or Wheezing      Vitals:  T(C): 36.9 (10-09-24 @ 11:10), Max: 37.1 (10-09-24 @ 04:03)  HR: 80 (10-09-24 @ 11:10) (74 - 84)  BP: 126/88 (10-09-24 @ 11:10) (110/66 - 149/90)  RR: 18 (10-09-24 @ 11:10) (18 - 18)  SpO2: 98% (10-09-24 @ 11:10) (94% - 99%)    Physical Examination:  General - Sitting up in bed, chronically ill-appearing, disheveled, looks older than stated age, RUE in sling  HEENT - +NGT, thrush on tongue  Cardiovascular - No LE edema    Neurologic Exam:  Mental status:  - Awake, Alert  - Oriented to: person/age. Knows he is in the hospital, but not which. Does not know month/year.  - Speech: fluent  - Repetition and naming: intact   - Follows simple commands. Performs cross command incorrectly - does not cross.  - Fund of knowledge: knows current president    Cranial nerves - PERRL, BTT b/l, EOMI - there is no difficulty with prolonged upgaze, no eyelid ptosis, face sensation (V1-V3) intact b/l, facial strength intact without asymmetry b/l - no eyelid closure weakness, no air escape when puffed cheeks are pushed in, hearing grossly intact, palate with symmetric elevation, shoulder shrug intact b/l, tongue midline on protrusion with full lateral movement  Dysarthria: not clearly dysarthric but extremely hypophonic    Motor -  No drift in the LUE x at least 10 seconds  He can move his RUE distally, but cannot lift it AG - orthopedic limitation d/t R proximal humerus fracture  LLE/RLE - no drift x at least 5 seconds b/l    Sensation - Light touch intact throughout    DTRs      No ankle clonus    Coordination - FNF intact LUE (cannot perform RUE d/t orthopedic limitation), HTS intact RLE, cannot perform LLE but suspect d/t orthopedic limitation - toe to finger intact b/l.    Gait and station - Unable to assess d/t fall risk/safety concerns.    Labs:                        9.6    6.01  )-----------( 268      ( 09 Oct 2024 05:51 )             30.6     10-09    140  |  97  |  7   ----------------------------<  123[H]  3.4[L]   |  28  |  0.77    Ca    9.6      09 Oct 2024 05:52  Phos  3.9     10-09  Mg     1.9     10-09    TPro  7.1  /  Alb  3.5  /  TBili  0.6  /  DBili  x   /  AST  10  /  ALT  7[L]  /  AlkPhos  99  10-09    CAPILLARY BLOOD GLUCOSE      POCT Blood Glucose.: 136 mg/dL (09 Oct 2024 11:52)    LIVER FUNCTIONS - ( 09 Oct 2024 05:52 )  Alb: 3.5 g/dL / Pro: 7.1 g/dL / ALK PHOS: 99 U/L / ALT: 7 U/L / AST: 10 U/L / GGT: x             PTT - ( 08 Oct 2024 18:05 )  PTT:68.2 sec  CSF:                  Radiology:  MR BRAIN WAW IC    PROCEDURE DATE:  10/08/2024      INTERPRETATION:  .    CLINICAL INFORMATION: Dysphagia. Subacute on chronic subdural hematomas   on outside head CT.    TECHNIQUE: Multiplanar multisequential MRI of the brain was acquired with   and without the administration of IV gadolinium. 7.5 cc's of IV Gadavist   was administered for the purposes of this examination. 0 cc were   discarded.    COMPARISON: Prior CT study of the head from 10/7/2024. Outside CT study   of the head dated 9/19/2024.    FINDINGS: There is a small focus of diffusion restriction within the   right cerebellar hemisphere (series 5, image 38). An additional small   focus of diffusion reduction is seen within the left posterior centrum   semiovale (series 5, image 54). Corresponding areas of T2/FLAIR   hyperintense signal change are seen in these areas.    A small area of encephalomalacia and gliosis is seen within the left   basal ganglia.    Multiple additional patchy confluent nonspecific T2/FLAIR hyperintense   signal changes are noted throughout the bihemispheric white matter   without associated mass effect or restricted diffusion.    There is no abnormal brain parenchymal or leptomeningeal enhancement.    No hydrocephalus is seen. There are no abnormal extra-axial fluid   collections. Flow-voids are noted throughout the major intracranial   vessels, on the T2 weighted images, consistent with their patency. The   sella turcica and posterior fossa are unremarkable.    Mild scattered mucosal thickening is seen throughout the paranasal   sinuses. The bilateral tympanomastoid cavities are clear. The calvarium   appears intact. The orbits appear unremarkable apart from a disconjugate   gaze.    IMPRESSION: Small acute/subacute infarcts within the right cerebellar   hemisphere and left posterior centrum semiovale with associated cytotoxic   edema.    No acute intracranial hemorrhage.    Multiple patchy confluent nonspecific abnormal white matter foci of   T2/FLAIR prolongation statistically favoring microvascular type changes.    Additional small area of encephalomalacia and gliosis within the left   basal ganglia which may related to chronic infarction and/or hemorrhage.     Neurology - Consult Note    -  Spectra: [phone] ([facility]), [phone] ([facility]). For new consults, please page: [phone] ([facility]), [phone] ([facility]).  -    HPI: Patient DENAE GIBBS is a 69y (1955) RIGHT-handed man who is currently admitted to [facility] since 10/4/2024, as a transfer from [facility], for management of large PE.     Per H&P:  "Mr. Denae Gibbs is a 69-year-old w/ PMH of HTN, T2DM, CVA, A. flutter, CAD s/p CABG x3 (~2018), and alcohol use disorder who initially presented to [facility] on 9/14 for a mechanical fall after he tripped and fell onto his R shoulder, denied any headstrike or LOC, subsequent XR showing R proximal humerus fracture with planned ORIF with Orthopedic Surgery. Course was c/b alcohol withdrawal and was started on CIWA protocol. On 9/18, RRT was called for AMS and patient was found to be in acute hypercapnic respiratory failure iso prior Librium dose. Patient was given flumazenil and then intubated and transferred to the MICU. ORIF of R humerus was cancelled and CT head showed signs of subacute and chronic subdural hematomas. Patient was extubated on 9/19 and repeat CT head showed stable hematomas. Following extubation, patient suffered from significant dysphagia but patient declined PEG tube offered by GI and instead NG tube was placed. On 10/4, patient was found to be hypoxic with subsequent CTA chest showing large PE of R main pulmonary artery. US of b/l LEs was further significant for b/l DVTs in the R peroneal vein and L popliteal, tibial trunk, peroneal, and posterior tibial veins. Patient was transferred to [facility] for further management of PE and embolectomy evaluation."    Given history of subacute vs. chronic subdural hematomas, patient underwent CTH on 10/7/2024 - exam degraded by motion at the vertex. Primary team d/w NSGY - they were OK with initiating heparin gtt. Patient now transitioned to apixaban 10mg BID for VTE. Underwent MRI brain on 10/8/2024, given ongoing dysphagia since extubation on 9/19/2024. Punctate infarcts seen in the right cerebellum and left centrum semiovale. Additionally, patient underwent swallow bedside assessment on 10/9 - he has prolonged bolus manipulation, prolonged bolus transport, and there is suspicion for laryngeal penetration/aspiration. Due for FEES. Vascular Neurology is consulted for MRI findings.    Patient interview:  On arrival to patient's room - he is getting fluids pushed down his NGT. Extremely hypophonic. Can speak, but difficult to understand. Able to shake head "yes" and "no."    Unclear based on patient's response if he was previously taking anticoagulation for atrial flutter. On anesthesia pre-op documentation from 2023 - patient apparently taking aspirin 81mg daily, but no AC listed. Documentation noted that he has smoked 1 PPD x 30 years (patient now reporting he only smokes "a little") and "1 big bottle beer per day."    Denies trouble swallowing in the past. Denies history of neurologic issues. Denies family history of neurologic issues. Says he had a stroke involving his R eye many years ago - says he lost vision in the temporal field of his right eye (points to this area - not able to verbalize complete history). Denies use of AD at baseline. Climbs stairs. Does not drive. Denies recreational drug use. 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    Review of Systems:  All other review of systems is negative unless indicated above.    Allergies:  No Known Allergies      PMHx/PSHx/Family Hx: As above, otherwise see below   HTN (hypertension)    T2DM (type 2 diabetes mellitus)    CVA (cerebrovascular accident)    Atrial flutter    CAD (coronary artery disease)    History of alcohol use disorder        Social Hx:  Per HPI    Medications:  MEDICATIONS  (STANDING):  aMIOdarone    Tablet 200 milliGRAM(s) Oral daily  amLODIPine   Tablet 10 milliGRAM(s) Oral daily  apixaban 10 milliGRAM(s) Oral every 12 hours  atorvastatin 80 milliGRAM(s) Oral at bedtime  dextrose 5%. 1000 milliLiter(s) (50 mL/Hr) IV Continuous <Continuous>  dextrose 5%. 1000 milliLiter(s) (100 mL/Hr) IV Continuous <Continuous>  dextrose 50% Injectable 12.5 Gram(s) IV Push once  dextrose 50% Injectable 25 Gram(s) IV Push once  dextrose 50% Injectable 25 Gram(s) IV Push once  dextrose Oral Gel 15 Gram(s) Oral once  fluticasone propionate/ salmeterol 250-50 MICROgram(s) Diskus 1 Dose(s) Inhalation two times a day  glucagon  Injectable 1 milliGRAM(s) IntraMuscular once  insulin lispro (ADMELOG) corrective regimen sliding scale   SubCutaneous three times a day before meals  insulin lispro (ADMELOG) corrective regimen sliding scale   SubCutaneous at bedtime  metoprolol tartrate 25 milliGRAM(s) Oral two times a day  pantoprazole  Injectable 40 milliGRAM(s) IV Push daily  piperacillin/tazobactam IVPB.. 3.375 Gram(s) IV Intermittent every 8 hours  polyethylene glycol 3350 17 Gram(s) Oral daily  potassium chloride   Powder 40 milliEquivalent(s) Enteral Tube every 4 hours  sodium chloride 3%  Inhalation 4 milliLiter(s) Inhalation every 12 hours  thiamine 100 milliGRAM(s) Oral daily    MEDICATIONS  (PRN):  albuterol/ipratropium for Nebulization 3 milliLiter(s) Nebulizer every 6 hours PRN Shortness of Breath and/or Wheezing      Vitals:  T(C): 36.9 (10-09-24 @ 11:10), Max: 37.1 (10-09-24 @ 04:03)  HR: 80 (10-09-24 @ 11:10) (74 - 84)  BP: 126/88 (10-09-24 @ 11:10) (110/66 - 149/90)  RR: 18 (10-09-24 @ 11:10) (18 - 18)  SpO2: 98% (10-09-24 @ 11:10) (94% - 99%)    Physical Examination:  General - Sitting up in bed, chronically ill-appearing, disheveled, looks older than stated age, RUE in sling  HEENT - +NGT, possible thrush on tongue  Cardiovascular - No LE edema    Neurologic Exam:  Mental status:  - Awake, Alert  - Oriented to: person/age. Knows he is in the hospital, but not which. Does not know month/year.  - Speech: fluent  - Repetition and naming: intact   - Follows simple commands. Performs cross command incorrectly - does not cross.  - Fund of knowledge: knows current president    Cranial nerves - PERRL, BTT b/l, EOMI - there is no difficulty with prolonged upgaze, no eyelid ptosis, face sensation (V1-V3) intact b/l, facial strength intact without asymmetry b/l - no eyelid closure weakness, no air escape when puffed cheeks are pushed in, hearing grossly intact, palate with symmetric elevation, shoulder shrug likely slightly reduced R side d/t orthopedic limitation, tongue midline on protrusion with full lateral movement  Dysarthria: not clearly dysarthric but extremely hypophonic    Motor -  No drift in the LUE x at least 10 seconds  He can move his RUE distally, but cannot lift it AG - orthopedic limitation d/t R proximal humerus fracture  LLE/RLE - no drift x at least 5 seconds b/l    Sensation - Light touch intact throughout    No ankle clonus    Coordination - FNF intact LUE (cannot perform RUE d/t orthopedic limitation), HTS intact RLE, cannot perform LLE but suspect d/t orthopedic limitation - toe to finger intact b/l.    Gait and station - Unable to assess d/t fall risk/safety concerns.    Labs:                        9.6    6.01  )-----------( 268      ( 09 Oct 2024 05:51 )             30.6     10-09    140  |  97  |  7   ----------------------------<  123[H]  3.4[L]   |  28  |  0.77    Ca    9.6      09 Oct 2024 05:52  Phos  3.9     10-09  Mg     1.9     10-09    TPro  7.1  /  Alb  3.5  /  TBili  0.6  /  DBili  x   /  AST  10  /  ALT  7[L]  /  AlkPhos  99  10-09    CAPILLARY BLOOD GLUCOSE      POCT Blood Glucose.: 136 mg/dL (09 Oct 2024 11:52)    LIVER FUNCTIONS - ( 09 Oct 2024 05:52 )  Alb: 3.5 g/dL / Pro: 7.1 g/dL / ALK PHOS: 99 U/L / ALT: 7 U/L / AST: 10 U/L / GGT: x             PTT - ( 08 Oct 2024 18:05 )  PTT:68.2 sec  CSF:                  Radiology:  MR BRAIN WAW IC    PROCEDURE DATE:  10/08/2024      INTERPRETATION:  .    CLINICAL INFORMATION: Dysphagia. Subacute on chronic subdural hematomas   on outside head CT.    TECHNIQUE: Multiplanar multisequential MRI of the brain was acquired with   and without the administration of IV gadolinium. 7.5 cc's of IV Gadavist   was administered for the purposes of this examination. 0 cc were   discarded.    COMPARISON: Prior CT study of the head from 10/7/2024. Outside CT study   of the head dated 9/19/2024.    FINDINGS: There is a small focus of diffusion restriction within the   right cerebellar hemisphere (series 5, image 38). An additional small   focus of diffusion reduction is seen within the left posterior centrum   semiovale (series 5, image 54). Corresponding areas of T2/FLAIR   hyperintense signal change are seen in these areas.    A small area of encephalomalacia and gliosis is seen within the left   basal ganglia.    Multiple additional patchy confluent nonspecific T2/FLAIR hyperintense   signal changes are noted throughout the bihemispheric white matter   without associated mass effect or restricted diffusion.    There is no abnormal brain parenchymal or leptomeningeal enhancement.    No hydrocephalus is seen. There are no abnormal extra-axial fluid   collections. Flow-voids are noted throughout the major intracranial   vessels, on the T2 weighted images, consistent with their patency. The   sella turcica and posterior fossa are unremarkable.    Mild scattered mucosal thickening is seen throughout the paranasal   sinuses. The bilateral tympanomastoid cavities are clear. The calvarium   appears intact. The orbits appear unremarkable apart from a disconjugate   gaze.    IMPRESSION: Small acute/subacute infarcts within the right cerebellar   hemisphere and left posterior centrum semiovale with associated cytotoxic   edema.    No acute intracranial hemorrhage.    Multiple patchy confluent nonspecific abnormal white matter foci of   T2/FLAIR prolongation statistically favoring microvascular type changes.    Additional small area of encephalomalacia and gliosis within the left   basal ganglia which may related to chronic infarction and/or hemorrhage.

## 2024-10-09 NOTE — SWALLOW BEDSIDE ASSESSMENT ADULT - H & P REVIEW
69-year-old w/ PMH of HTN, T2DM, CVA, A. flutter, CAD s/p CABG x3 (~2018), and alcohol use disorder who initially presented to [facility] on 9/14 for a mechanical fall after he tripped and fell onto his R shoulder, denied any headstrike or LOC, subsequent XR showing R proximal humerus fracture with planned ORIF with Orthopedic Surgery. Course was c/b alcohol withdrawal and was started on CIWA protocol. On 9/18, RRT was called for AMS and patient was found to be in acute hypercapnic respiratory failure iso prior Librium dose. Patient was given flumazenil and then intubated and transferred to the MICU. ORIF of R humerus was cancelled and CT head showed signs of subacute and chronic subdural hematomas. Patient was extubated on 9/19 and repeat CT head showed stable hematomas. Following extubation, patient suffered from significant dysphagia but patient declined PEG tube offered by GI and instead NG tube was placed. On 10/4, patient was found to be hypoxic with subsequent CTA chest showing large PE of R main pulmonary artery. US of b/l LEs was further significant for b/l DVTs in the R peroneal vein and L popliteal, tibial trunk, peroneal, and posterior tibial veins. Patient was transferred to [facility] for further management of PE and embolectomy evaluation./yes

## 2024-10-10 LAB
ALBUMIN SERPL ELPH-MCNC: 3.6 G/DL — SIGNIFICANT CHANGE UP (ref 3.3–5)
ALP SERPL-CCNC: 99 U/L — SIGNIFICANT CHANGE UP (ref 40–120)
ALT FLD-CCNC: 7 U/L — LOW (ref 10–45)
ANION GAP SERPL CALC-SCNC: 10 MMOL/L — SIGNIFICANT CHANGE UP (ref 5–17)
APTT BLD: 35.7 SEC — HIGH (ref 24.5–35.6)
AST SERPL-CCNC: 14 U/L — SIGNIFICANT CHANGE UP (ref 10–40)
BASOPHILS # BLD AUTO: 0.04 K/UL — SIGNIFICANT CHANGE UP (ref 0–0.2)
BASOPHILS NFR BLD AUTO: 0.8 % — SIGNIFICANT CHANGE UP (ref 0–2)
BILIRUB SERPL-MCNC: 0.4 MG/DL — SIGNIFICANT CHANGE UP (ref 0.2–1.2)
BUN SERPL-MCNC: 6 MG/DL — LOW (ref 7–23)
CALCIUM SERPL-MCNC: 9.8 MG/DL — SIGNIFICANT CHANGE UP (ref 8.4–10.5)
CHLORIDE SERPL-SCNC: 97 MMOL/L — SIGNIFICANT CHANGE UP (ref 96–108)
CHOLEST SERPL-MCNC: 159 MG/DL — SIGNIFICANT CHANGE UP
CO2 SERPL-SCNC: 30 MMOL/L — SIGNIFICANT CHANGE UP (ref 22–31)
CREAT SERPL-MCNC: 0.7 MG/DL — SIGNIFICANT CHANGE UP (ref 0.5–1.3)
EGFR: 101 ML/MIN/1.73M2 — SIGNIFICANT CHANGE UP
EOSINOPHIL # BLD AUTO: 0.23 K/UL — SIGNIFICANT CHANGE UP (ref 0–0.5)
EOSINOPHIL NFR BLD AUTO: 4.5 % — SIGNIFICANT CHANGE UP (ref 0–6)
GLUCOSE BLDC GLUCOMTR-MCNC: 134 MG/DL — HIGH (ref 70–99)
GLUCOSE BLDC GLUCOMTR-MCNC: 137 MG/DL — HIGH (ref 70–99)
GLUCOSE BLDC GLUCOMTR-MCNC: 142 MG/DL — HIGH (ref 70–99)
GLUCOSE BLDC GLUCOMTR-MCNC: 158 MG/DL — HIGH (ref 70–99)
GLUCOSE BLDC GLUCOMTR-MCNC: 166 MG/DL — HIGH (ref 70–99)
GLUCOSE SERPL-MCNC: 129 MG/DL — HIGH (ref 70–99)
HCT VFR BLD CALC: 32.4 % — LOW (ref 39–50)
HDLC SERPL-MCNC: 34 MG/DL — LOW
HGB BLD-MCNC: 10.4 G/DL — LOW (ref 13–17)
IMM GRANULOCYTES NFR BLD AUTO: 0.6 % — SIGNIFICANT CHANGE UP (ref 0–0.9)
LIPID PNL WITH DIRECT LDL SERPL: 105 MG/DL — HIGH
LYMPHOCYTES # BLD AUTO: 1.24 K/UL — SIGNIFICANT CHANGE UP (ref 1–3.3)
LYMPHOCYTES # BLD AUTO: 24.2 % — SIGNIFICANT CHANGE UP (ref 13–44)
MAGNESIUM SERPL-MCNC: 1.9 MG/DL — SIGNIFICANT CHANGE UP (ref 1.6–2.6)
MCHC RBC-ENTMCNC: 29.8 PG — SIGNIFICANT CHANGE UP (ref 27–34)
MCHC RBC-ENTMCNC: 32.1 GM/DL — SIGNIFICANT CHANGE UP (ref 32–36)
MCV RBC AUTO: 92.8 FL — SIGNIFICANT CHANGE UP (ref 80–100)
MONOCYTES # BLD AUTO: 0.56 K/UL — SIGNIFICANT CHANGE UP (ref 0–0.9)
MONOCYTES NFR BLD AUTO: 10.9 % — SIGNIFICANT CHANGE UP (ref 2–14)
NEUTROPHILS # BLD AUTO: 3.03 K/UL — SIGNIFICANT CHANGE UP (ref 1.8–7.4)
NEUTROPHILS NFR BLD AUTO: 59 % — SIGNIFICANT CHANGE UP (ref 43–77)
NON HDL CHOLESTEROL: 125 MG/DL — SIGNIFICANT CHANGE UP
NRBC # BLD: 0 /100 WBCS — SIGNIFICANT CHANGE UP (ref 0–0)
PHOSPHATE SERPL-MCNC: 3.7 MG/DL — SIGNIFICANT CHANGE UP (ref 2.5–4.5)
PLATELET # BLD AUTO: 262 K/UL — SIGNIFICANT CHANGE UP (ref 150–400)
POTASSIUM SERPL-MCNC: 3.8 MMOL/L — SIGNIFICANT CHANGE UP (ref 3.5–5.3)
POTASSIUM SERPL-SCNC: 3.8 MMOL/L — SIGNIFICANT CHANGE UP (ref 3.5–5.3)
PROT SERPL-MCNC: 7 G/DL — SIGNIFICANT CHANGE UP (ref 6–8.3)
RBC # BLD: 3.49 M/UL — LOW (ref 4.2–5.8)
RBC # FLD: 13.1 % — SIGNIFICANT CHANGE UP (ref 10.3–14.5)
SODIUM SERPL-SCNC: 137 MMOL/L — SIGNIFICANT CHANGE UP (ref 135–145)
TRIGL SERPL-MCNC: 114 MG/DL — SIGNIFICANT CHANGE UP
WBC # BLD: 5.13 K/UL — SIGNIFICANT CHANGE UP (ref 3.8–10.5)
WBC # FLD AUTO: 5.13 K/UL — SIGNIFICANT CHANGE UP (ref 3.8–10.5)

## 2024-10-10 PROCEDURE — 99232 SBSQ HOSP IP/OBS MODERATE 35: CPT | Mod: GC

## 2024-10-10 RX ADMIN — Medication 1: at 12:41

## 2024-10-10 RX ADMIN — SODIUM CHLORIDE 4 MILLILITER(S): 9 INJECTION, SOLUTION INTRAMUSCULAR; INTRAVENOUS; SUBCUTANEOUS at 05:51

## 2024-10-10 RX ADMIN — FLUTICASONE PROPIONATE AND SALMETEROL XINAFOATE 1 DOSE(S): 230; 21 AEROSOL, METERED RESPIRATORY (INHALATION) at 18:54

## 2024-10-10 RX ADMIN — APIXABAN 10 MILLIGRAM(S): 5 TABLET, FILM COATED ORAL at 05:55

## 2024-10-10 RX ADMIN — Medication 25 MILLIGRAM(S): at 05:55

## 2024-10-10 RX ADMIN — FLUTICASONE PROPIONATE AND SALMETEROL XINAFOATE 1 DOSE(S): 230; 21 AEROSOL, METERED RESPIRATORY (INHALATION) at 05:51

## 2024-10-10 RX ADMIN — Medication 25 MILLIGRAM(S): at 18:54

## 2024-10-10 RX ADMIN — PIPERACILLIN AND TAZOBACTAM 25 GRAM(S): .5; 4 INJECTION, POWDER, LYOPHILIZED, FOR SOLUTION INTRAVENOUS at 21:29

## 2024-10-10 RX ADMIN — POLYETHYLENE GLYCOL 3350 17 GRAM(S): 17 POWDER, FOR SOLUTION ORAL at 12:43

## 2024-10-10 RX ADMIN — PIPERACILLIN AND TAZOBACTAM 25 GRAM(S): .5; 4 INJECTION, POWDER, LYOPHILIZED, FOR SOLUTION INTRAVENOUS at 05:50

## 2024-10-10 RX ADMIN — Medication 10 MILLIGRAM(S): at 05:55

## 2024-10-10 RX ADMIN — Medication 80 MILLIGRAM(S): at 21:17

## 2024-10-10 RX ADMIN — Medication 100 MILLIGRAM(S): at 12:42

## 2024-10-10 RX ADMIN — PANTOPRAZOLE SODIUM 40 MILLIGRAM(S): 40 TABLET, DELAYED RELEASE ORAL at 12:42

## 2024-10-10 RX ADMIN — Medication 200 MILLIGRAM(S): at 05:55

## 2024-10-10 NOTE — PROGRESS NOTE ADULT - PROBLEM SELECTOR PLAN 2
- Hospital course at [facility] c/b AHRF  - S/p intubation on 9/18 for likely benzodiazepine overdose, extubated on 9/19  - Diffuse rhonchi with copious secretions on exam  - CTA chest (10/4) -> Large PE of R main pulmonary artery, scattered GGOs and interstitial changes predominantly in R and L lower lobes  - Likely PE +/- superimposed aspiration PNA  > C/w management of PE, as above  > C/w empiric Zosyn D1:10/5  > C/w oxygen supplementation prn  -wean as tolerated - Hospital course at [facility] c/b AHRF  - S/p intubation on 9/18 for likely benzodiazepine overdose, extubated on 9/19  - Diffuse rhonchi with copious secretions on exam  - CTA chest (10/4) -> Large PE of R main pulmonary artery, scattered GGOs and interstitial changes predominantly in R and L lower lobes  - Likely PE +/- superimposed aspiration PNA  > C/w management of PE, as above  > C/w empiric Zosyn for 7d (10/5-10/12)  > C/w oxygen supplementation prn  - attempt to d/c nasal cannula today

## 2024-10-10 NOTE — PROGRESS NOTE ADULT - PROBLEM SELECTOR PLAN 1
- Hospital course at [facility] c/b AHRF  - CTA chest (10/4) -> Large PE of R main pulmonary artery  - US of b/l LEs -> R peroneal vein and L popliteal, tibial trunk, peroneal, and posterior tibial DVTs  - EKG showing evidence of right heart strain, S1Q3T3 changes, T wave depressions in precordial leads; not present on EKG from 9/15  - TTE (9/15) unremarkable  - Troponin 92, BNP 7168 on transfer  > CTH not acutely concerning  > C/w heparin gtt w/ target PTT-> 0.4-0.6 Repeat TTE not concerning  > Vascular cardiology consulted rec heparin for now and eventual transition to DOAC  - Neurosurg consulted for AC management, they said okay to PTT 60-80  - Transitioned to DOAC loading eliquis 10/8 - Hospital course at [facility] c/b AHRF  - CTA chest (10/4) -> Large PE of R main pulmonary artery  - US of b/l LEs -> R peroneal vein and L popliteal, tibial trunk, peroneal, and posterior tibial DVTs  - EKG showing evidence of right heart strain, S1Q3T3 changes, T wave depressions in precordial leads; not present on EKG from 9/15  - TTE (9/15) unremarkable  - Troponin 92, BNP 7168 on transfer  > CTH not acutely concerning  > C/w heparin gtt w/ target PTT-> 0.4-0.6 Repeat TTE not concerning  > Vascular cardiology consulted rec heparin for now and eventual transition to DOAC  - Neurosurg consulted for AC management, they said okay to PTT 60-80  - Transitioned to DOAC loading eliquis 10/8, maintenance dose 5mg bid 10/10

## 2024-10-10 NOTE — SWALLOW FEES ASSESSMENT ADULT - ASPIRATION AFTER SWALLOW - SILENT
Repetitive aspiration of post-swallow residue mixed w/ secretions; cued cough briefly clears subglottic material, however, repetitive silent aspiration persists/Mild

## 2024-10-10 NOTE — PROGRESS NOTE ADULT - PROBLEM SELECTOR PLAN 7
- Significant dysphagia s/p extubation on 9/19  - Patient declined PEG tube, NG tube placed instead, pt pulled out, now replaced  > S&S eval ordered  > 10/8 MRI brain: Small acute/subacute infarcts within the right cerebellar   hemisphere and left posterior centrum semiovale with associated cytotoxic   edema. 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  - per neuro, ordered myasthenia gravis labs, outpatient f/u, can consider ENT/GI consult  - outpatient f/u - Significant dysphagia s/p extubation on 9/19  - Patient declined PEG tube, NG tube placed instead, pt pulled out, now replaced  > failed bedside S&S eval w/ laryngeal aspiration, rec'd NPO and FEES  > 10/8 MRI brain: Small acute/subacute infarcts within the right cerebellar   hemisphere and left posterior centrum semiovale with associated cytotoxic   edema.  - per neuro, ordered myasthenia gravis labs, outpatient f/u, can consider ENT/GI consult  - outpatient f/u  - s/p FEES 10/10, recommended to remain NPO

## 2024-10-10 NOTE — PROGRESS NOTE ADULT - ASSESSMENT
Mr. Jarrett Overton is a 69-year-old w/ PMH of HTN, T2DM, CVA, A. flutter, CAD s/p CABG x3 (~2018), and alcohol use disorder who initially presented to [facility] on 9/14 for a mechanical fall. On 10/4, patient was found to be hypoxic with subsequent CTA chest showing large PE of R main pulmonary artery. US of b/l LEs was further significant for b/l DVTs in the R peroneal vein and L popliteal, tibial trunk, peroneal, and posterior tibial veins. Patient was transferred to [facility] for further management of PE and embolectomy evaluation.

## 2024-10-10 NOTE — PROGRESS NOTE ADULT - PROBLEM SELECTOR PLAN 8
10/8 MRI brain: Small acute/subacute infarcts within the right cerebellar   hemisphere and left posterior centrum semiovale with associated cytotoxic   edema  -Neuro consulted  -Lipid panel ordered, consider statin 10/8 MRI brain: Small acute/subacute infarcts within the right cerebellar   hemisphere and left posterior centrum semiovale with associated cytotoxic   edema  -Neuro consulted  -Lipid panel ordered, consider statin  - target LDL <70, SBP <130  - start thiamine

## 2024-10-10 NOTE — PROGRESS NOTE ADULT - ATTENDING COMMENTS
Patient is a 69 year old male, with PMH of HTN, DM2, ?CVA/TIA, Aflutter, CAD s/p CABG (2018), EtOH use disorder (two 40oz beers daily), current smoker (8 cigs/day), L total hip arthroplasty, initially admitted to [facility] (9/14/24-10/4/24) after mech fall c/b R prox humeral fx and EtOH withdrawal. Was on CIWA with Librium/Ativan PRN. TTE on 9/15/24 showed LVEF 60-65%, grad I DD, and an aortic valve prosthesis with normal function. Course c/b AMS, acute hypoxemic/hypercapnic respiratory, and agonal breathing on 9/18/24. Was initially given flumazenil x2 with some improvement, but was intubated for airway protection given excessive secretions and transferred to MICU 9/18/24. CTA chest was somewhat limited study but did not find PE at the time, noted RUL GGOs "suspicious for atypical or viral infection." CTH showed subacute and chronic SDHs as well as chronic lacunar infarct within L lentiform nucleus. Repeat CT showed stable SDHs. Extubated on 9/19/24. Pt was downgraded to Medicine floors on 9/21/24. Noted to have loss of voice and dysphagia. Evaluated by S+S, found dysphagia on video flouroscopy to all textures and recommended for PEG tube, but pt refused and opted for NGT placement. Ortho surgery that was planned for humeral fx was cancelled and ultimately recommended for conservative management only. CXR on 10/3/24 showed new LLL infiltrate, started on Zosyn. On 10/4/24, pt desatted on supplemental O2, CTA chest found massive PE in R main bronchus with pulm trunk dilatation. BLE duplex also found b/l LE DVTs (L>R). Started on hep gtt and transferred to [facility] for embolectomy evaluation.    #Acute hypoxemic respiratory failure  #CVA  #R main bronchus PE, b/l LE DVTs  #?PNA  #Dysphagia, hypophonia  #R humeral fx  #Chronic aflutter  #DM2  #EtOH/tobacco use disorder    - Vascular Cardiology following  - transitioned to eliquis 10mg BID for 7 days (including heparin drip days) and then change to 5mg BID. Will need to do price check for eliquis.   - c/w empiric zosyn for now; likely continue for 7 day course   - c/w amio and metoprolol (was on at OSH)  - c/w advair BID and duonebs q6h PRN  - c/w low ISS for now, monitor FS  - monitor on telemetry  - repeat CTH stable  - MRI head done showing acute/subacute infarcts; neuro eval for further recs; f/u MG labs   - TTE noted with no acute findings   - S+S eval noted; NPO; FEES to be done   - Ortho states no plan for intervention inpatient for humerus fracture; outpatient follow up     - started on NG feeds 10/5 but patient pulled out NGT 10/6 overnight; ENT replaced NGT on 10/7. Resume NGT feeds; dietitian recs noted   - wean O2 as tolerable; currently on 3L NC but saturating well; obtain O2 sats on room air     - VA duplex LE with extensive blood clots bilaterally; no interventions planned as per vascular cardio   - PT eval: ROSALBA     Rest as above. Discussed with HS.

## 2024-10-10 NOTE — PROGRESS NOTE ADULT - PROBLEM SELECTOR PLAN 3
- US of b/l LEs at [facility] -> R peroneal vein and L popliteal, tibial trunk, peroneal, and posterior tibial DVTs  > Repeat US of b/l LEs showing mulitple DVTs  > C/w eliquis, as above

## 2024-10-10 NOTE — SWALLOW FEES ASSESSMENT ADULT - SLP GENERAL OBSERVATIONS
Encountered Pt asleep in bed on 3L O2 via NC; easily aroused to verbal stimuli. +bridled NGT. +b/l mittens. Pt is A&Ox2-3 w/ confusion, verbally responsive w/ hoarse/hypophonic vocal quality, and able to follow simple commands. Encountered Pt asleep in bed on 3L O2 via NC; easily aroused to verbal stimuli. +bridled NGT. +b/l mittens. Pt is A&Ox2-3 w/ confusion, verbally responsive w/ hoarse/hypophonic vocal quality, and able to follow simple commands. Wet, gurgly vocal quality noted at baseline. Encountered Pt asleep in bed on 3L O2 via NC; easily aroused to verbal stimuli. +bridled NGT. +b/l mittens. Pt is A&Ox2-3 w/ confusion, verbally responsive w/ hoarse/hypophonic vocal quality, and able to follow simple commands. Wet, gurgly vocal quality noted at baseline. On initial attempt, SLP passed scope w/ visualization of the laryngeal vestibule, however, Pt demanded that SLP remove scope and declined to participate in exam. Pt agreeable to participate in exam after encouragement from SLP and blowing nose.

## 2024-10-10 NOTE — SWALLOW FEES ASSESSMENT ADULT - H & P REVIEW
69-year-old w/ PMH of HTN, T2DM, CVA, A. flutter, CAD s/p CABG x3 (~2018), and alcohol use disorder who initially presented to [facility] on 9/14 for a mechanical fall after he tripped and fell onto his R shoulder, denied any headstrike or LOC, subsequent XR showing R proximal humerus fracture with planned ORIF with Orthopedic Surgery. Course was c/b alcohol withdrawal and was started on CIWA protocol. On 9/18, RRT was called for AMS and patient was found to be in acute hypercapnic respiratory failure iso prior Librium dose. Patient was given flumazenil and then intubated and transferred to the MICU. ORIF of R humerus was cancelled and CT head showed signs of subacute and chronic subdural hematomas. Patient was extubated on 9/19 and repeat CT head showed stable hematomas. Following extubation, patient suffered from significant dysphagia but patient declined PEG tube offered by GI and instead NG tube was placed. On 10/4, patient was found to be hypoxic with subsequent CTA chest showing large PE of R main pulmonary artery. US of b/l LEs was further significant for b/l DVTs in the R peroneal vein and L popliteal, tibial trunk, peroneal, and posterior tibial veins. Patient was transferred to [facility] for further management of PE and embolectomy evaluation./yes

## 2024-10-10 NOTE — SWALLOW FEES ASSESSMENT ADULT - DELAYED INITIATION OF THE PHARYNGEAL SWALLOW
Uncontrolled spillover of material from the oral cavity along the lateral channels to the hypopharynx

## 2024-10-10 NOTE — SWALLOW FEES ASSESSMENT ADULT - DIAGNOSTIC IMPRESSIONS
69y M who presented as a transfer from OSH s/p fall w/ R humerus fx. Hospital course c/b alcohol withdrawal, AMS, acute hypoxic respiratory failure requiring intubation, subacute/chronic SDH, acute/subacute infarcts, dysphagia, DVTs, and PE. Pt presents w/ poor secretion management and an oropharyngeal dysphagia. Poor secretion management is evidenced by baseline laryngeal penetration and silent aspiration of secretions which was cleared w/ a cued cough prior to PO trials. The oral stage of swallow is characterized by uncontrolled spillover of material. The pharyngeal stage of swallow is characterized by repetitive silent aspiration of post-swallow residue mixed w/ secretions on ice chips. Cued cough initially clears subglottic material, however, residue is repetitively ejected and further aspirated. Pt should remain NPO w/ alternative means of nutrition/hydration/medication. Consider palliative consult for further discussion re: provisions of nutrition/hydration/medication and advanced directives. This service will continue to follow Pt to provide dysphagia tx while in-house.     Disorders: reduced BOT to posterior pharyngeal wall contact, delay in trigger of the swallow reflex, reduced laryngeal closure, reduced supraglottic sensation, reduced subglottic sensation

## 2024-10-10 NOTE — SWALLOW FEES ASSESSMENT ADULT - COMMENTS
10/5 Cardiology consulted- pt w/ elevated troponin but no documented evidence of RV strain; TTE and MRI recommended. 10/6 pt restless pulling at IV, NC and self-removed NGT. Several reattempts to replace however much resistance in each nostril. Pt put in restraints. Neurology vs nsgy eval given c/f intracranial process and to discuss safety of AC. 10/7 pt endorsing hunger in AM. ENT consulted and successfully placement NGT through left-nare w/ recommendations for extra saline flush s/p medication administration to prevent clogging of NGT. 10/8 orthopedic surgery recommended outpatient surgery for proximal humeral fracture.     Imaging:  MRI- Small acute/subacute infarcts within the right cerebellar hemisphere and left posterior centrum semiovale with associated cytotoxic edema. No acute intracranial hemorrhage. Multiple patchy confluent nonspecific abnormal white matter foci of T2/FLAIR prolongation statistically favoring microvascular type changes. Additional small area of encephalomalacia and gliosis within the left basal ganglia which may related to chronic infarction and/or hemorrhage.  CXR- Bibasilar hazy opacities, left greater than right may represent atelectasis versus pneumonia.    Swallow hx: As per chart review, pt evaluated by S+S at [facility] found dysphagia on video fluoroscopy to all textures and recommended for PEG tube, but pt refused and opted for NGT placement (remains in place). ON THIS ADMISSION: Seen for initial bedside swallow evaluation 10/9. Patient presented with evidence of an segun-pharyngeal dysphagia superimposed upon by cognitive deficits and reduced secretion management. FEES recommended to further assess the swallow mechanism. +bridled NGT  +Patchy white mucosa along the base-of-tongue which may be indicative of thrush  +b/l vocal cords mobile

## 2024-10-10 NOTE — PROGRESS NOTE ADULT - SUBJECTIVE AND OBJECTIVE BOX
Ayden Delgado MD  PGY 3 Department of Internal Medicine        Patient is a 69y old  Male who presents with a chief complaint of Pulmonary Embolism (09 Oct 2024 13:15)      SUBJECTIVE / OVERNIGHT EVENTS: Pt seen and examined. No acute overnight events. Denies fevers, chills, CP, SOB, Abdominal pain, N/V, Constipation, Diarrhea        MEDICATIONS  (STANDING):  aMIOdarone    Tablet 200 milliGRAM(s) Oral daily  amLODIPine   Tablet 10 milliGRAM(s) Oral daily  apixaban 10 milliGRAM(s) Oral every 12 hours  atorvastatin 80 milliGRAM(s) Oral at bedtime  dextrose 5%. 1000 milliLiter(s) (100 mL/Hr) IV Continuous <Continuous>  dextrose 5%. 1000 milliLiter(s) (50 mL/Hr) IV Continuous <Continuous>  dextrose 50% Injectable 12.5 Gram(s) IV Push once  dextrose 50% Injectable 25 Gram(s) IV Push once  dextrose 50% Injectable 25 Gram(s) IV Push once  dextrose Oral Gel 15 Gram(s) Oral once  fluticasone propionate/ salmeterol 250-50 MICROgram(s) Diskus 1 Dose(s) Inhalation two times a day  glucagon  Injectable 1 milliGRAM(s) IntraMuscular once  insulin lispro (ADMELOG) corrective regimen sliding scale   SubCutaneous at bedtime  insulin lispro (ADMELOG) corrective regimen sliding scale   SubCutaneous three times a day before meals  metoprolol tartrate 25 milliGRAM(s) Oral two times a day  pantoprazole  Injectable 40 milliGRAM(s) IV Push daily  piperacillin/tazobactam IVPB.. 3.375 Gram(s) IV Intermittent every 8 hours  polyethylene glycol 3350 17 Gram(s) Oral daily  thiamine 100 milliGRAM(s) Oral daily    MEDICATIONS  (PRN):  albuterol/ipratropium for Nebulization 3 milliLiter(s) Nebulizer every 6 hours PRN Shortness of Breath and/or Wheezing      I&O's Summary    09 Oct 2024 07:01  -  10 Oct 2024 07:00  --------------------------------------------------------  IN: 870 mL / OUT: 1375 mL / NET: -505 mL        Vital Signs Last 24 Hrs  T(C): 36.4 (10 Oct 2024 04:58), Max: 36.9 (09 Oct 2024 11:10)  T(F): 97.6 (10 Oct 2024 04:58), Max: 98.4 (09 Oct 2024 11:10)  HR: 76 (10 Oct 2024 04:58) (69 - 80)  BP: 128/91 (10 Oct 2024 04:58) (118/80 - 128/91)  BP(mean): --  RR: 18 (10 Oct 2024 04:58) (18 - 18)  SpO2: 99% (10 Oct 2024 04:58) (91% - 99%)    Parameters below as of 10 Oct 2024 04:58  Patient On (Oxygen Delivery Method): nasal cannula  O2 Flow (L/min): 3      CAPILLARY BLOOD GLUCOSE      POCT Blood Glucose.: 137 mg/dL (10 Oct 2024 05:53)  POCT Blood Glucose.: 134 mg/dL (10 Oct 2024 00:30)  POCT Blood Glucose.: 113 mg/dL (09 Oct 2024 18:16)  POCT Blood Glucose.: 136 mg/dL (09 Oct 2024 11:52)      PHYSICAL EXAM:  GENERAL: NAD, lying in bed comfortably  HEAD:  Atraumatic, normocephalic  EYES: EOMI, PERRLA, conjunctiva clear, no conjunctival pallor, anicteric sclera  NECK: Supple, trachea midline, no JVD  HEART: Regular rate and rhythm, Normal S1 S2, no murmurs, rubs, or gallops  LUNGS: Unlabored respirations. Clear to ascultation bilaterally, no crackles, wheezing, or rhonchi  ABDOMEN: Soft, nondistended, nontender, no rebound or guarding, bowel sounds presents  EXTREMITIES: Warm extremities, no clubbing, cyanosis, or edema, peripheral pulses 2+ bilaterally  MUSCULOSKELETAL: No joint swelling or tenderness to palpation  NEURO: CN 2-12 grossly intact, moves all limbs spontaneously  SKIN: No rashes or lesions         LABS:                        10.4   5.13  )-----------( 262      ( 10 Oct 2024 05:37 )             32.4     Auto Eosinophil # 0.23  / Auto Eosinophil % 4.5   / Auto Neutrophil # 3.03  / Auto Neutrophil % 59.0  / BANDS % x                            9.6    6.01  )-----------( 268      ( 09 Oct 2024 05:51 )             30.6     Auto Eosinophil # 0.21  / Auto Eosinophil % 3.5   / Auto Neutrophil # 4.01  / Auto Neutrophil % 66.7  / BANDS % x        10-10    137  |  97  |  6[L]  ----------------------------<  129[H]  3.8   |  30  |  0.70  10-09    140  |  97  |  7   ----------------------------<  123[H]  3.4[L]   |  28  |  0.77    Ca    9.8      10 Oct 2024 05:38  Mg     1.9     10-10  Phos  3.7     10-10  TPro  7.0  /  Alb  3.6  /  TBili  0.4  /  DBili  x   /  AST  14  /  ALT  7[L]  /  AlkPhos  99  10-10  TPro  7.1  /  Alb  3.5  /  TBili  0.6  /  DBili  x   /  AST  10  /  ALT  7[L]  /  AlkPhos  99  10-09    PTT - ( 08 Oct 2024 18:05 )  PTT:68.2 sec      Urinalysis Basic - ( 10 Oct 2024 05:38 )    Color: x / Appearance: x / SG: x / pH: x  Gluc: 129 mg/dL / Ketone: x  / Bili: x / Urobili: x   Blood: x / Protein: x / Nitrite: x   Leuk Esterase: x / RBC: x / WBC x   Sq Epi: x / Non Sq Epi: x / Bacteria: x            RADIOLOGY & ADDITIONAL TESTS:    Imaging Personally Reviewed:    Consultant(s) Notes Reviewed:      Care Discussed with Consultants/Other Providers:   Ayden Delgado MD  PGY 3 Department of Internal Medicine        Patient is a 69y old  Male who presents with a chief complaint of Pulmonary Embolism (09 Oct 2024 13:15)    INTERVAL EVENTS: Patient seen by S&S, recommended NPO and FEES.    SUBJECTIVE / OVERNIGHT EVENTS: Pt seen and examined. No AOE, weaned from 4-->3L NC. States he wants to go home to his family, who lives nearby. Upset w/ inability to ambulate or urinate independently. Denies fevers, chills, CP, SOB, Abdominal pain, N/V, Constipation, Diarrhea.        MEDICATIONS  (STANDING):  aMIOdarone    Tablet 200 milliGRAM(s) Oral daily  amLODIPine   Tablet 10 milliGRAM(s) Oral daily  apixaban 10 milliGRAM(s) Oral every 12 hours  atorvastatin 80 milliGRAM(s) Oral at bedtime  dextrose 5%. 1000 milliLiter(s) (100 mL/Hr) IV Continuous <Continuous>  dextrose 5%. 1000 milliLiter(s) (50 mL/Hr) IV Continuous <Continuous>  dextrose 50% Injectable 12.5 Gram(s) IV Push once  dextrose 50% Injectable 25 Gram(s) IV Push once  dextrose 50% Injectable 25 Gram(s) IV Push once  dextrose Oral Gel 15 Gram(s) Oral once  fluticasone propionate/ salmeterol 250-50 MICROgram(s) Diskus 1 Dose(s) Inhalation two times a day  glucagon  Injectable 1 milliGRAM(s) IntraMuscular once  insulin lispro (ADMELOG) corrective regimen sliding scale   SubCutaneous at bedtime  insulin lispro (ADMELOG) corrective regimen sliding scale   SubCutaneous three times a day before meals  metoprolol tartrate 25 milliGRAM(s) Oral two times a day  pantoprazole  Injectable 40 milliGRAM(s) IV Push daily  piperacillin/tazobactam IVPB.. 3.375 Gram(s) IV Intermittent every 8 hours  polyethylene glycol 3350 17 Gram(s) Oral daily  thiamine 100 milliGRAM(s) Oral daily    MEDICATIONS  (PRN):  albuterol/ipratropium for Nebulization 3 milliLiter(s) Nebulizer every 6 hours PRN Shortness of Breath and/or Wheezing      I&O's Summary    09 Oct 2024 07:01  -  10 Oct 2024 07:00  --------------------------------------------------------  IN: 870 mL / OUT: 1375 mL / NET: -505 mL        Vital Signs Last 24 Hrs  T(C): 36.4 (10 Oct 2024 04:58), Max: 36.9 (09 Oct 2024 11:10)  T(F): 97.6 (10 Oct 2024 04:58), Max: 98.4 (09 Oct 2024 11:10)  HR: 76 (10 Oct 2024 04:58) (69 - 80)  BP: 128/91 (10 Oct 2024 04:58) (118/80 - 128/91)  BP(mean): --  RR: 18 (10 Oct 2024 04:58) (18 - 18)  SpO2: 99% (10 Oct 2024 04:58) (91% - 99%)    Parameters below as of 10 Oct 2024 04:58  Patient On (Oxygen Delivery Method): nasal cannula  O2 Flow (L/min): 3      CAPILLARY BLOOD GLUCOSE      POCT Blood Glucose.: 137 mg/dL (10 Oct 2024 05:53)  POCT Blood Glucose.: 134 mg/dL (10 Oct 2024 00:30)  POCT Blood Glucose.: 113 mg/dL (09 Oct 2024 18:16)  POCT Blood Glucose.: 136 mg/dL (09 Oct 2024 11:52)      PHYSICAL EXAM:  GENERAL: NAD, lying in bed comfortably  HEAD:  Atraumatic, normocephalic  EYES: EOMI, PERRLA, conjunctiva clear, no conjunctival pallor, anicteric sclera  NECK: Supple, trachea midline, no JVD  HEART: Regular rate and rhythm, Normal S1 S2, no murmurs, rubs, or gallops  LUNGS: Unlabored respirations. Clear to ascultation bilaterally, no crackles, wheezing, or rhonchi  ABDOMEN: Soft, nondistended, nontender, no rebound or guarding, bowel sounds presents  EXTREMITIES: Warm extremities, no clubbing, cyanosis, or edema, peripheral pulses 2+ bilaterally  MUSCULOSKELETAL: No joint swelling or tenderness to palpation  NEURO: CN 2-12 grossly intact, moves all limbs spontaneously  SKIN: No rashes or lesions         LABS:                        10.4   5.13  )-----------( 262      ( 10 Oct 2024 05:37 )             32.4     Auto Eosinophil # 0.23  / Auto Eosinophil % 4.5   / Auto Neutrophil # 3.03  / Auto Neutrophil % 59.0  / BANDS % x                            9.6    6.01  )-----------( 268      ( 09 Oct 2024 05:51 )             30.6     Auto Eosinophil # 0.21  / Auto Eosinophil % 3.5   / Auto Neutrophil # 4.01  / Auto Neutrophil % 66.7  / BANDS % x        10-10    137  |  97  |  6[L]  ----------------------------<  129[H]  3.8   |  30  |  0.70  10-09    140  |  97  |  7   ----------------------------<  123[H]  3.4[L]   |  28  |  0.77    Ca    9.8      10 Oct 2024 05:38  Mg     1.9     10-10  Phos  3.7     10-10  TPro  7.0  /  Alb  3.6  /  TBili  0.4  /  DBili  x   /  AST  14  /  ALT  7[L]  /  AlkPhos  99  10-10  TPro  7.1  /  Alb  3.5  /  TBili  0.6  /  DBili  x   /  AST  10  /  ALT  7[L]  /  AlkPhos  99  10-09    PTT - ( 08 Oct 2024 18:05 )  PTT:68.2 sec      Urinalysis Basic - ( 10 Oct 2024 05:38 )    Color: x / Appearance: x / SG: x / pH: x  Gluc: 129 mg/dL / Ketone: x  / Bili: x / Urobili: x   Blood: x / Protein: x / Nitrite: x   Leuk Esterase: x / RBC: x / WBC x   Sq Epi: x / Non Sq Epi: x / Bacteria: x            RADIOLOGY & ADDITIONAL TESTS:    Imaging Personally Reviewed:    Consultant(s) Notes Reviewed:      Care Discussed with Consultants/Other Providers:   Ayden Delgado MD  PGY 3 Department of Internal Medicine        Patient is a 69y old  Male who presents with a chief complaint of Pulmonary Embolism (09 Oct 2024 13:15)    INTERVAL EVENTS: Patient seen by S&S, recommended NPO and FEES. No 24h insulin correction required.    SUBJECTIVE / OVERNIGHT EVENTS: Pt seen and examined. No AOE, weaned from 4-->3L NC. States he wants to go home to his family, who lives nearby. Upset w/ inability to ambulate or urinate independently. Denies fevers, chills, CP, SOB, Abdominal pain, N/V, Constipation, Diarrhea. Denies any pain or arm discomfort.        MEDICATIONS  (STANDING):  aMIOdarone    Tablet 200 milliGRAM(s) Oral daily  amLODIPine   Tablet 10 milliGRAM(s) Oral daily  apixaban 10 milliGRAM(s) Oral every 12 hours  atorvastatin 80 milliGRAM(s) Oral at bedtime  dextrose 5%. 1000 milliLiter(s) (100 mL/Hr) IV Continuous <Continuous>  dextrose 5%. 1000 milliLiter(s) (50 mL/Hr) IV Continuous <Continuous>  dextrose 50% Injectable 12.5 Gram(s) IV Push once  dextrose 50% Injectable 25 Gram(s) IV Push once  dextrose 50% Injectable 25 Gram(s) IV Push once  dextrose Oral Gel 15 Gram(s) Oral once  fluticasone propionate/ salmeterol 250-50 MICROgram(s) Diskus 1 Dose(s) Inhalation two times a day  glucagon  Injectable 1 milliGRAM(s) IntraMuscular once  insulin lispro (ADMELOG) corrective regimen sliding scale   SubCutaneous at bedtime  insulin lispro (ADMELOG) corrective regimen sliding scale   SubCutaneous three times a day before meals  metoprolol tartrate 25 milliGRAM(s) Oral two times a day  pantoprazole  Injectable 40 milliGRAM(s) IV Push daily  piperacillin/tazobactam IVPB.. 3.375 Gram(s) IV Intermittent every 8 hours  polyethylene glycol 3350 17 Gram(s) Oral daily  thiamine 100 milliGRAM(s) Oral daily    MEDICATIONS  (PRN):  albuterol/ipratropium for Nebulization 3 milliLiter(s) Nebulizer every 6 hours PRN Shortness of Breath and/or Wheezing      I&O's Summary    09 Oct 2024 07:01  -  10 Oct 2024 07:00  --------------------------------------------------------  IN: 870 mL / OUT: 1375 mL / NET: -505 mL        Vital Signs Last 24 Hrs  T(C): 36.4 (10 Oct 2024 04:58), Max: 36.9 (09 Oct 2024 11:10)  T(F): 97.6 (10 Oct 2024 04:58), Max: 98.4 (09 Oct 2024 11:10)  HR: 76 (10 Oct 2024 04:58) (69 - 80)  BP: 128/91 (10 Oct 2024 04:58) (118/80 - 128/91)  BP(mean): --  RR: 18 (10 Oct 2024 04:58) (18 - 18)  SpO2: 99% (10 Oct 2024 04:58) (91% - 99%)    Parameters below as of 10 Oct 2024 04:58  Patient On (Oxygen Delivery Method): nasal cannula  O2 Flow (L/min): 3      CAPILLARY BLOOD GLUCOSE      POCT Blood Glucose.: 137 mg/dL (10 Oct 2024 05:53)  POCT Blood Glucose.: 134 mg/dL (10 Oct 2024 00:30)  POCT Blood Glucose.: 113 mg/dL (09 Oct 2024 18:16)  POCT Blood Glucose.: 136 mg/dL (09 Oct 2024 11:52)      PHYSICAL EXAM:  GENERAL: NAD, sleeping in bed comfortably, AAOx1, states he is in "bank," able to identify w/ prompting after told in hospital, "2023"  HEAD:  Atraumatic, normocephalic  EYES: EOMI, PERRLA, conjunctiva clear, no conjunctival pallor, anicteric sclera  NECK: Supple, trachea midline, no JVD  HEART: Regular rate and rhythm, Normal S1 S2, no murmurs, rubs, or gallops  LUNGS: Unlabored respirations. Clear to ascultation bilaterally, no crackles, wheezing, or rhonchi  ABDOMEN: Soft, nondistended, nontender, no rebound or guarding, bowel sounds presents  EXTREMITIES: Warm extremities, no clubbing, cyanosis, or edema, peripheral pulses 2+ bilaterally  MUSCULOSKELETAL: No joint swelling or tenderness to palpation  NEURO: CN 2-12 grossly intact, moves all limbs spontaneously  SKIN: No rashes or lesions         LABS:                        10.4   5.13  )-----------( 262      ( 10 Oct 2024 05:37 )             32.4     Auto Eosinophil # 0.23  / Auto Eosinophil % 4.5   / Auto Neutrophil # 3.03  / Auto Neutrophil % 59.0  / BANDS % x                            9.6    6.01  )-----------( 268      ( 09 Oct 2024 05:51 )             30.6     Auto Eosinophil # 0.21  / Auto Eosinophil % 3.5   / Auto Neutrophil # 4.01  / Auto Neutrophil % 66.7  / BANDS % x        10-10    137  |  97  |  6[L]  ----------------------------<  129[H]  3.8   |  30  |  0.70  10-09    140  |  97  |  7   ----------------------------<  123[H]  3.4[L]   |  28  |  0.77    Ca    9.8      10 Oct 2024 05:38  Mg     1.9     10-10  Phos  3.7     10-10  TPro  7.0  /  Alb  3.6  /  TBili  0.4  /  DBili  x   /  AST  14  /  ALT  7[L]  /  AlkPhos  99  10-10  TPro  7.1  /  Alb  3.5  /  TBili  0.6  /  DBili  x   /  AST  10  /  ALT  7[L]  /  AlkPhos  99  10-09    PTT - ( 08 Oct 2024 18:05 )  PTT:68.2 sec      Urinalysis Basic - ( 10 Oct 2024 05:38 )    Color: x / Appearance: x / SG: x / pH: x  Gluc: 129 mg/dL / Ketone: x  / Bili: x / Urobili: x   Blood: x / Protein: x / Nitrite: x   Leuk Esterase: x / RBC: x / WBC x   Sq Epi: x / Non Sq Epi: x / Bacteria: x            RADIOLOGY & ADDITIONAL TESTS:    Imaging Personally Reviewed:    Consultant(s) Notes Reviewed:      Care Discussed with Consultants/Other Providers:   Ayden Delgado MD  PGY 3 Department of Internal Medicine        Patient is a 69y old  Male who presents with a chief complaint of Pulmonary Embolism (09 Oct 2024 13:15)    INTERVAL EVENTS: Patient seen by S&S, recommended NPO and FEES. No 24h insulin correction required.    SUBJECTIVE / OVERNIGHT EVENTS: Pt seen and examined. No AOE, weaned from 4-->3L NC. States he wants to go home to his family, who lives nearby. Upset w/ inability to ambulate or urinate independently. Denies fevers, chills, CP, SOB, Abdominal pain, N/V, Constipation, Diarrhea. Denies any pain or arm discomfort.        MEDICATIONS  (STANDING):  aMIOdarone    Tablet 200 milliGRAM(s) Oral daily  amLODIPine   Tablet 10 milliGRAM(s) Oral daily  apixaban 10 milliGRAM(s) Oral every 12 hours  atorvastatin 80 milliGRAM(s) Oral at bedtime  dextrose 5%. 1000 milliLiter(s) (100 mL/Hr) IV Continuous <Continuous>  dextrose 5%. 1000 milliLiter(s) (50 mL/Hr) IV Continuous <Continuous>  dextrose 50% Injectable 12.5 Gram(s) IV Push once  dextrose 50% Injectable 25 Gram(s) IV Push once  dextrose 50% Injectable 25 Gram(s) IV Push once  dextrose Oral Gel 15 Gram(s) Oral once  fluticasone propionate/ salmeterol 250-50 MICROgram(s) Diskus 1 Dose(s) Inhalation two times a day  glucagon  Injectable 1 milliGRAM(s) IntraMuscular once  insulin lispro (ADMELOG) corrective regimen sliding scale   SubCutaneous at bedtime  insulin lispro (ADMELOG) corrective regimen sliding scale   SubCutaneous three times a day before meals  metoprolol tartrate 25 milliGRAM(s) Oral two times a day  pantoprazole  Injectable 40 milliGRAM(s) IV Push daily  piperacillin/tazobactam IVPB.. 3.375 Gram(s) IV Intermittent every 8 hours  polyethylene glycol 3350 17 Gram(s) Oral daily  thiamine 100 milliGRAM(s) Oral daily    MEDICATIONS  (PRN):  albuterol/ipratropium for Nebulization 3 milliLiter(s) Nebulizer every 6 hours PRN Shortness of Breath and/or Wheezing      I&O's Summary    09 Oct 2024 07:01  -  10 Oct 2024 07:00  --------------------------------------------------------  IN: 870 mL / OUT: 1375 mL / NET: -505 mL        Vital Signs Last 24 Hrs  T(C): 36.4 (10 Oct 2024 04:58), Max: 36.9 (09 Oct 2024 11:10)  T(F): 97.6 (10 Oct 2024 04:58), Max: 98.4 (09 Oct 2024 11:10)  HR: 76 (10 Oct 2024 04:58) (69 - 80)  BP: 128/91 (10 Oct 2024 04:58) (118/80 - 128/91)  BP(mean): --  RR: 18 (10 Oct 2024 04:58) (18 - 18)  SpO2: 99% (10 Oct 2024 04:58) (91% - 99%)    Parameters below as of 10 Oct 2024 04:58  Patient On (Oxygen Delivery Method): nasal cannula  O2 Flow (L/min): 3      CAPILLARY BLOOD GLUCOSE      POCT Blood Glucose.: 137 mg/dL (10 Oct 2024 05:53)  POCT Blood Glucose.: 134 mg/dL (10 Oct 2024 00:30)  POCT Blood Glucose.: 113 mg/dL (09 Oct 2024 18:16)  POCT Blood Glucose.: 136 mg/dL (09 Oct 2024 11:52)      PHYSICAL EXAM:  GENERAL: NAD, sleeping in bed comfortably, AAOx1, states he is in "bank," able to identify w/ prompting after told in hospital, "2023"  HEAD:  Atraumatic, normocephalic  EYES: EOMI, PERRLA, conjunctiva clear, no conjunctival pallor, anicteric sclera  NECK: Supple, trachea midline, no JVD  HEART: Regular rate and rhythm, Normal S1 S2, +systolic murmur  LUNGS: Unlabored respirations. Clear to ascultation bilaterally, no crackles, wheezing, or rhonchi  ABDOMEN: Soft, nondistended, nontender, no rebound or guarding, bowel sounds presents  EXTREMITIES: Warm extremities, no clubbing, cyanosis, or edema, peripheral pulses 2+ bilaterally  MUSCULOSKELETAL: No joint swelling or tenderness to palpation  NEURO: CN 2-12 grossly intact, moves all limbs spontaneously  SKIN: No rashes or lesions         LABS:                        10.4   5.13  )-----------( 262      ( 10 Oct 2024 05:37 )             32.4     Auto Eosinophil # 0.23  / Auto Eosinophil % 4.5   / Auto Neutrophil # 3.03  / Auto Neutrophil % 59.0  / BANDS % x                            9.6    6.01  )-----------( 268      ( 09 Oct 2024 05:51 )             30.6     Auto Eosinophil # 0.21  / Auto Eosinophil % 3.5   / Auto Neutrophil # 4.01  / Auto Neutrophil % 66.7  / BANDS % x        10-10    137  |  97  |  6[L]  ----------------------------<  129[H]  3.8   |  30  |  0.70  10-09    140  |  97  |  7   ----------------------------<  123[H]  3.4[L]   |  28  |  0.77    Ca    9.8      10 Oct 2024 05:38  Mg     1.9     10-10  Phos  3.7     10-10  TPro  7.0  /  Alb  3.6  /  TBili  0.4  /  DBili  x   /  AST  14  /  ALT  7[L]  /  AlkPhos  99  10-10  TPro  7.1  /  Alb  3.5  /  TBili  0.6  /  DBili  x   /  AST  10  /  ALT  7[L]  /  AlkPhos  99  10-09    PTT - ( 08 Oct 2024 18:05 )  PTT:68.2 sec      Urinalysis Basic - ( 10 Oct 2024 05:38 )    Color: x / Appearance: x / SG: x / pH: x  Gluc: 129 mg/dL / Ketone: x  / Bili: x / Urobili: x   Blood: x / Protein: x / Nitrite: x   Leuk Esterase: x / RBC: x / WBC x   Sq Epi: x / Non Sq Epi: x / Bacteria: x            RADIOLOGY & ADDITIONAL TESTS:    Imaging Personally Reviewed:    Consultant(s) Notes Reviewed:      Care Discussed with Consultants/Other Providers:

## 2024-10-10 NOTE — SWALLOW FEES ASSESSMENT ADULT - DEMONSTRATES NEED FOR REFERRAL TO ANOTHER SERVICE
Consider Palliative consult to address Pt/family wishes re: provisions of nutrition/hydration/medication and advanced directives

## 2024-10-10 NOTE — PROGRESS NOTE ADULT - PROBLEM SELECTOR PLAN 4
- Initially presented to [facility] on 9/14 for a mechanical fall after he tripped and fell onto his R shoulder  - XR showing R proximal humerus fracture, ORIF with Orthopedic Surgery at [facility] cancelled due to MICU course  > Orthopedic surgery on 10/7 said do outpt surgery.

## 2024-10-11 LAB
ALBUMIN SERPL ELPH-MCNC: 3.5 G/DL — SIGNIFICANT CHANGE UP (ref 3.3–5)
ALP SERPL-CCNC: 102 U/L — SIGNIFICANT CHANGE UP (ref 40–120)
ALT FLD-CCNC: 11 U/L — SIGNIFICANT CHANGE UP (ref 10–45)
ANION GAP SERPL CALC-SCNC: 10 MMOL/L — SIGNIFICANT CHANGE UP (ref 5–17)
APTT BLD: 70.7 SEC — HIGH (ref 24.5–35.6)
APTT BLD: 78.9 SEC — HIGH (ref 24.5–35.6)
AST SERPL-CCNC: 18 U/L — SIGNIFICANT CHANGE UP (ref 10–40)
BASOPHILS # BLD AUTO: 0.05 K/UL — SIGNIFICANT CHANGE UP (ref 0–0.2)
BASOPHILS NFR BLD AUTO: 0.9 % — SIGNIFICANT CHANGE UP (ref 0–2)
BILIRUB SERPL-MCNC: 0.4 MG/DL — SIGNIFICANT CHANGE UP (ref 0.2–1.2)
BUN SERPL-MCNC: 8 MG/DL — SIGNIFICANT CHANGE UP (ref 7–23)
CALCIUM SERPL-MCNC: 9.8 MG/DL — SIGNIFICANT CHANGE UP (ref 8.4–10.5)
CHLORIDE SERPL-SCNC: 99 MMOL/L — SIGNIFICANT CHANGE UP (ref 96–108)
CO2 SERPL-SCNC: 31 MMOL/L — SIGNIFICANT CHANGE UP (ref 22–31)
CREAT SERPL-MCNC: 0.79 MG/DL — SIGNIFICANT CHANGE UP (ref 0.5–1.3)
EGFR: 97 ML/MIN/1.73M2 — SIGNIFICANT CHANGE UP
EOSINOPHIL # BLD AUTO: 0.25 K/UL — SIGNIFICANT CHANGE UP (ref 0–0.5)
EOSINOPHIL NFR BLD AUTO: 4.5 % — SIGNIFICANT CHANGE UP (ref 0–6)
GLUCOSE BLDC GLUCOMTR-MCNC: 147 MG/DL — HIGH (ref 70–99)
GLUCOSE BLDC GLUCOMTR-MCNC: 149 MG/DL — HIGH (ref 70–99)
GLUCOSE BLDC GLUCOMTR-MCNC: 159 MG/DL — HIGH (ref 70–99)
GLUCOSE BLDC GLUCOMTR-MCNC: 173 MG/DL — HIGH (ref 70–99)
GLUCOSE SERPL-MCNC: 148 MG/DL — HIGH (ref 70–99)
HCT VFR BLD CALC: 32.5 % — LOW (ref 39–50)
HCT VFR BLD CALC: 32.7 % — LOW (ref 39–50)
HGB BLD-MCNC: 10.4 G/DL — LOW (ref 13–17)
HGB BLD-MCNC: 10.4 G/DL — LOW (ref 13–17)
IMM GRANULOCYTES NFR BLD AUTO: 0.4 % — SIGNIFICANT CHANGE UP (ref 0–0.9)
LYMPHOCYTES # BLD AUTO: 1.23 K/UL — SIGNIFICANT CHANGE UP (ref 1–3.3)
LYMPHOCYTES # BLD AUTO: 21.9 % — SIGNIFICANT CHANGE UP (ref 13–44)
MAGNESIUM SERPL-MCNC: 2 MG/DL — SIGNIFICANT CHANGE UP (ref 1.6–2.6)
MCHC RBC-ENTMCNC: 29.6 PG — SIGNIFICANT CHANGE UP (ref 27–34)
MCHC RBC-ENTMCNC: 30 PG — SIGNIFICANT CHANGE UP (ref 27–34)
MCHC RBC-ENTMCNC: 31.8 GM/DL — LOW (ref 32–36)
MCHC RBC-ENTMCNC: 32 GM/DL — SIGNIFICANT CHANGE UP (ref 32–36)
MCV RBC AUTO: 92.6 FL — SIGNIFICANT CHANGE UP (ref 80–100)
MCV RBC AUTO: 94.2 FL — SIGNIFICANT CHANGE UP (ref 80–100)
MONOCYTES # BLD AUTO: 0.65 K/UL — SIGNIFICANT CHANGE UP (ref 0–0.9)
MONOCYTES NFR BLD AUTO: 11.6 % — SIGNIFICANT CHANGE UP (ref 2–14)
NEUTROPHILS # BLD AUTO: 3.41 K/UL — SIGNIFICANT CHANGE UP (ref 1.8–7.4)
NEUTROPHILS NFR BLD AUTO: 60.7 % — SIGNIFICANT CHANGE UP (ref 43–77)
NRBC # BLD: 0 /100 WBCS — SIGNIFICANT CHANGE UP (ref 0–0)
NRBC # BLD: 0 /100 WBCS — SIGNIFICANT CHANGE UP (ref 0–0)
PHOSPHATE SERPL-MCNC: 4.5 MG/DL — SIGNIFICANT CHANGE UP (ref 2.5–4.5)
PLATELET # BLD AUTO: 261 K/UL — SIGNIFICANT CHANGE UP (ref 150–400)
PLATELET # BLD AUTO: 267 K/UL — SIGNIFICANT CHANGE UP (ref 150–400)
POTASSIUM SERPL-MCNC: 4 MMOL/L — SIGNIFICANT CHANGE UP (ref 3.5–5.3)
POTASSIUM SERPL-SCNC: 4 MMOL/L — SIGNIFICANT CHANGE UP (ref 3.5–5.3)
PROT SERPL-MCNC: 7.2 G/DL — SIGNIFICANT CHANGE UP (ref 6–8.3)
RBC # BLD: 3.47 M/UL — LOW (ref 4.2–5.8)
RBC # BLD: 3.51 M/UL — LOW (ref 4.2–5.8)
RBC # FLD: 13.3 % — SIGNIFICANT CHANGE UP (ref 10.3–14.5)
RBC # FLD: 13.4 % — SIGNIFICANT CHANGE UP (ref 10.3–14.5)
SODIUM SERPL-SCNC: 140 MMOL/L — SIGNIFICANT CHANGE UP (ref 135–145)
WBC # BLD: 5.61 K/UL — SIGNIFICANT CHANGE UP (ref 3.8–10.5)
WBC # BLD: 6.17 K/UL — SIGNIFICANT CHANGE UP (ref 3.8–10.5)
WBC # FLD AUTO: 5.61 K/UL — SIGNIFICANT CHANGE UP (ref 3.8–10.5)
WBC # FLD AUTO: 6.17 K/UL — SIGNIFICANT CHANGE UP (ref 3.8–10.5)

## 2024-10-11 PROCEDURE — 99222 1ST HOSP IP/OBS MODERATE 55: CPT

## 2024-10-11 PROCEDURE — 99232 SBSQ HOSP IP/OBS MODERATE 35: CPT | Mod: GC

## 2024-10-11 RX ORDER — INSULIN LISPRO 100/ML
VIAL (ML) SUBCUTANEOUS EVERY 6 HOURS
Refills: 0 | Status: DISCONTINUED | OUTPATIENT
Start: 2024-10-11 | End: 2024-10-24

## 2024-10-11 RX ORDER — HEPARIN SODIUM 10000 [USP'U]/ML
1400 INJECTION INTRAVENOUS; SUBCUTANEOUS
Qty: 25000 | Refills: 0 | Status: DISCONTINUED | OUTPATIENT
Start: 2024-10-11 | End: 2024-10-14

## 2024-10-11 RX ADMIN — PIPERACILLIN AND TAZOBACTAM 25 GRAM(S): .5; 4 INJECTION, POWDER, LYOPHILIZED, FOR SOLUTION INTRAVENOUS at 09:10

## 2024-10-11 RX ADMIN — HEPARIN SODIUM 14 UNIT(S)/HR: 10000 INJECTION INTRAVENOUS; SUBCUTANEOUS at 09:13

## 2024-10-11 RX ADMIN — Medication 25 MILLIGRAM(S): at 09:09

## 2024-10-11 RX ADMIN — Medication 1: at 09:18

## 2024-10-11 RX ADMIN — PANTOPRAZOLE SODIUM 40 MILLIGRAM(S): 40 TABLET, DELAYED RELEASE ORAL at 12:05

## 2024-10-11 RX ADMIN — Medication 10 MILLIGRAM(S): at 09:10

## 2024-10-11 RX ADMIN — Medication 40 MILLIGRAM(S): at 22:33

## 2024-10-11 RX ADMIN — Medication 1: at 12:05

## 2024-10-11 RX ADMIN — Medication 200 MILLIGRAM(S): at 09:10

## 2024-10-11 RX ADMIN — Medication 25 MILLIGRAM(S): at 17:39

## 2024-10-11 RX ADMIN — PIPERACILLIN AND TAZOBACTAM 25 GRAM(S): .5; 4 INJECTION, POWDER, LYOPHILIZED, FOR SOLUTION INTRAVENOUS at 16:21

## 2024-10-11 RX ADMIN — PIPERACILLIN AND TAZOBACTAM 25 GRAM(S): .5; 4 INJECTION, POWDER, LYOPHILIZED, FOR SOLUTION INTRAVENOUS at 22:30

## 2024-10-11 RX ADMIN — FLUTICASONE PROPIONATE AND SALMETEROL XINAFOATE 1 DOSE(S): 230; 21 AEROSOL, METERED RESPIRATORY (INHALATION) at 17:39

## 2024-10-11 RX ADMIN — Medication 100 MILLIGRAM(S): at 12:04

## 2024-10-11 RX ADMIN — FLUTICASONE PROPIONATE AND SALMETEROL XINAFOATE 1 DOSE(S): 230; 21 AEROSOL, METERED RESPIRATORY (INHALATION) at 09:10

## 2024-10-11 NOTE — OCCUPATIONAL THERAPY INITIAL EVALUATION ADULT - ADDITIONAL COMMENTS
Obtained from chart as Pt poor historian, does not want to answer 'so many questions'. Lives [place] home alone with 5 steps to enter, 8 steps to bedroom bathroom (as per sister difficulty with stairs), Pt has shower chair, walker. Pt sister assists as needed.

## 2024-10-11 NOTE — CONSULT NOTE ADULT - ATTENDING COMMENTS
This is a 68 yo M with PMH of CAD s/p CABG, CVA, HTN, and T2DM, PE and ETOH use disorder who we are asked to see for PEG. The patient has dysphagia. He is alert and denies abdominal pain. His abdomen is soft, non-distended, with no palpable masses. Labs reviewed. A/P We explained the procedure to the patient and answered his questions. Plan is to schedule for Tuesday, October 15, 2024.

## 2024-10-11 NOTE — DISCHARGE NOTE PROVIDER - CARE PROVIDER_API CALL
Akbar Elena  Neurology  [address], [address]  [place] [address]  Phone: [phone]  Fax: [phone]  Follow Up Time: Routine

## 2024-10-11 NOTE — DISCHARGE NOTE PROVIDER - ATTENDING DISCHARGE PHYSICAL EXAMINATION:
Vital Signs Last 24 Hrs  T(C): 37 (24 Oct 2024 10:59), Max: 37.1 (23 Oct 2024 20:52)  T(F): 98.6 (24 Oct 2024 10:59), Max: 98.8 (23 Oct 2024 20:52)  HR: 75 (24 Oct 2024 10:59) (75 - 80)  BP: 103/70 (24 Oct 2024 10:59) (103/70 - 113/70)  BP(mean): --  RR: 18 (24 Oct 2024 10:59) (18 - 18)  SpO2: 96% (24 Oct 2024 10:59) (94% - 96%)    Parameters below as of 24 Oct 2024 10:59  Patient On (Oxygen Delivery Method): room air        CONSTITUTIONAL: NAD  EYES: No conjunctival or scleral injection, non-icteric;   ENMT: No external nasal lesions; MMM  NECK: Trachea midline   RESPIRATORY: Breathing comfortably; lungs CTA without wheeze/rhonchi/rales  CARDIOVASCULAR: +S1S2, RRR, no lower extremity edema  GASTROINTESTINAL: No palpable masses or tenderness, no rebound/guarding  SKIN: Warm, dry  PSYCHIATRIC: Calm, cooperative

## 2024-10-11 NOTE — OCCUPATIONAL THERAPY INITIAL EVALUATION ADULT - NSOTDMEREC_GEN_A_CORE
If home, Pt will need assist with all ADL and functional mobility, will require 3:1 commode pt will require commode due to pt confined to single level without safe access to a bathroom. poly fly wheelchair

## 2024-10-11 NOTE — OCCUPATIONAL THERAPY INITIAL EVALUATION ADULT - COGNITIVE, VISUAL PERCEPTUAL, OT EVAL
GOAL: Patient will follow directions 100% of the time with simple direction during simple ADL task within 4 weeks.

## 2024-10-11 NOTE — PROGRESS NOTE ADULT - PROBLEM SELECTOR PLAN 8
10/8 MRI brain: Small acute/subacute infarcts within the right cerebellar   hemisphere and left posterior centrum semiovale with associated cytotoxic   edema  -Neuro consulted  -Lipid panel ordered, consider statin  - target LDL <70, SBP <130  - start thiamine 10/8 MRI brain: Small acute/subacute infarcts within the right cerebellar   hemisphere and left posterior centrum semiovale with associated cytotoxic   edema  -Neuro consulted  - statin 40  - target LDL <70, SBP <130  - start thiamine Yes

## 2024-10-11 NOTE — PROGRESS NOTE ADULT - ATTENDING COMMENTS
Patient is a 69 year old male, with PMH of HTN, DM2, ?CVA/TIA, Aflutter, CAD s/p CABG (2018), EtOH use disorder (two 40oz beers daily), current smoker (8 cigs/day), L total hip arthroplasty, initially admitted to [facility] (9/14/24-10/4/24) after mech fall c/b R prox humeral fx and EtOH withdrawal. Was on CIWA with Librium/Ativan PRN. TTE on 9/15/24 showed LVEF 60-65%, grad I DD, and an aortic valve prosthesis with normal function. Course c/b AMS, acute hypoxemic/hypercapnic respiratory, and agonal breathing on 9/18/24. Was initially given flumazenil x2 with some improvement, but was intubated for airway protection given excessive secretions and transferred to MICU 9/18/24. CTA chest was somewhat limited study but did not find PE at the time, noted RUL GGOs "suspicious for atypical or viral infection." CTH showed subacute and chronic SDHs as well as chronic lacunar infarct within L lentiform nucleus. Repeat CT showed stable SDHs. Extubated on 9/19/24. Pt was downgraded to Medicine floors on 9/21/24. Noted to have loss of voice and dysphagia. Evaluated by S+S, found dysphagia on video flouroscopy to all textures and recommended for PEG tube, but pt refused and opted for NGT placement. Ortho surgery that was planned for humeral fx was cancelled and ultimately recommended for conservative management only. CXR on 10/3/24 showed new LLL infiltrate, started on Zosyn. On 10/4/24, pt desatted on supplemental O2, CTA chest found massive PE in R main bronchus with pulm trunk dilatation. BLE duplex also found b/l LE DVTs (L>R). Started on hep gtt and transferred to [facility] for embolectomy evaluation.    #Acute hypoxemic respiratory failure  #CVA  #R main bronchus PE, b/l LE DVTs  #?PNA  #Dysphagia, hypophonia  #R humeral fx  #Chronic aflutter  #DM2  #EtOH/tobacco use disorder    - Vascular Cardiology following  - transitioned to eliquis 10mg BID for 7 days (including heparin drip days); now back on heparin drip for planned PEG tube placement; when ready to switch back to eliquis, will be on 5mg BID.    - c/w empiric zosyn for now; likely continue for 7 day course   - c/w amio and metoprolol (was on at OSH)  - c/w advair BID and duonebs q6h PRN  - c/w low ISS for now, monitor FS  - monitor on telemetry  - repeat CTH stable  - MRI head done showing acute/subacute infarcts; neuro eval for further recs; f/u MG labs   - TTE noted with no acute findings   - S+S eval noted; NPO; FEES done and patient did not pass study  - Ortho states no plan for intervention inpatient for humerus fracture; outpatient follow up     - started on NG feeds 10/5 but patient pulled out NGT 10/6 overnight; ENT replaced NGT on 10/7. Resume NGT feeds; dietitian recs noted   - wean O2 as tolerable; currently on 2L NC but saturating well; obtain O2 sats on room air     - VA duplex LE with extensive blood clots bilaterally; no interventions planned as per vascular cardio  - due to failed S/S eval, plan for PEG tube placement with GI; plan for procedure on Tuesday   - PT eval: ROSALBA     Rest as above. Discussed with HS.

## 2024-10-11 NOTE — PROGRESS NOTE ADULT - PROBLEM SELECTOR PLAN 2
- Hospital course at [facility] c/b AHRF  - S/p intubation on 9/18 for likely benzodiazepine overdose, extubated on 9/19  - Diffuse rhonchi with copious secretions on exam  - CTA chest (10/4) -> Large PE of R main pulmonary artery, scattered GGOs and interstitial changes predominantly in R and L lower lobes  - Likely PE +/- superimposed aspiration PNA  > C/w management of PE, as above  > C/w empiric Zosyn for 7d (10/5-10/12)  > C/w oxygen supplementation prn  - attempt to d/c nasal cannula today

## 2024-10-11 NOTE — OCCUPATIONAL THERAPY INITIAL EVALUATION ADULT - PERTINENT HX OF CURRENT PROBLEM, REHAB EVAL
Mr. Jarrett Overton is a 69-year-old w/ PMH of HTN, T2DM, CVA, A. flutter, CAD s/p CABG x3 (~2018), and alcohol use disorder who initially presented to [facility] on 9/14 for a mechanical fall after he tripped and fell onto his R shoulder, denied any headstrike or LOC, subsequent XR showing R proximal humerus fracture with planned ORIF with Orthopedic Surgery. Course was c/b alcohol withdrawal and was started on CIWA protocol. On 9/18, RRT was called for AMS and patient was found to be in acute hypercapnic respiratory failure iso prior Librium dose. Patient was given flumazenil and then intubated and transferred to the MICU. ORIF of R humerus was cancelled and CT head showed signs of subacute and chronic subdural hematomas. Patient was extubated on 9/19 and repeat CT head showed stable hematomas. Following extubation, patient suffered from significant dysphagia but patient declined PEG tube offered by GI and instead NG tube was placed. On 10/4, patient was found to be hypoxic with subsequent CTA chest showing large PE of R main pulmonary artery. US of b/l LEs was further significant for b/l DVTs in the R peroneal vein and L popliteal, tibial trunk, peroneal, and posterior tibial veins. Patient was transferred to [facility] for further management of PE and embolectomy evaluation.  MRI 10/8 IMPRESSION: Small acute/subacute infarcts within the right cerebellar   hemisphere and left posterior centrum semiovale with associated cytotoxic   edema. No acute intracranial hemorrhage.

## 2024-10-11 NOTE — DISCHARGE NOTE PROVIDER - NSDCCPTREATMENT_GEN_ALL_CORE_FT
PRINCIPAL PROCEDURE  Procedure: MRI  Findings and Treatment:   < end of copied text >  IMPRESSION: Small acute/subacute infarcts within the right cerebellar   hemisphere and left posterior centrum semiovale with associated cytotoxic   edema.  No acute intracranial hemorrhage.  Multiple patchy confluent nonspecific abnormal white matter foci of   T2/FLAIR prolongation statistically favoring microvascular type changes.  Additional small area of encephalomalacia and gliosis within the left   basal ganglia which may related to chronic infarction and/or hemorrhage.  --- End ofReport ---< from: MR Head w/wo IV Cont (10.08.24 @ 21:42) >       PRINCIPAL PROCEDURE  Procedure: MRI  Findings and Treatment:   < end of copied text >  IMPRESSION: Small acute/subacute infarcts within the right cerebellar   hemisphere and left posterior centrum semiovale with associated cytotoxic   edema.  No acute intracranial hemorrhage.  Multiple patchy confluent nonspecific abnormal white matter foci of   T2/FLAIR prolongation statistically favoring microvascular type changes.  Additional small area of encephalomalacia and gliosis within the left   basal ganglia which may related to chronic infarction and/or hemorrhage.  --- End ofReport ---< from: MR Head w/wo IV Cont (10.08.24 @ 21:42) >        SECONDARY PROCEDURE  Procedure: Ultrasound of veins of both lower extremities for deep vein thrombosis (DVT)  Findings and Treatment:   IMPRESSION:  Acute deep venous thrombosis: above and below the knee.       PRINCIPAL PROCEDURE  Procedure: MRI  Findings and Treatment: IMPRESSION: Small acute/subacute infarcts within the right cerebellar   hemisphere and left posterior centrum semiovale with associated cytotoxic   edema.  No acute intracranial hemorrhage.  Multiple patchy confluent nonspecific abnormal white matter foci of   T2/FLAIR prolongation statistically favoring microvascular type changes.  Additional small area of encephalomalacia and gliosis within the left   basal ganglia which may related to chronic infarction and/or hemorrhage.        SECONDARY PROCEDURE  Procedure: Abdomen CT  Findings and Treatment: T12 fracture unchanged since 9/18/2024. L2 fracture is similar in   appearance to 10/4/2024.      Procedure: Ultrasound of veins of both lower extremities for deep vein thrombosis (DVT)  Findings and Treatment:   IMPRESSION:  Acute deep venous thrombosis: above and below the knee.       PRINCIPAL PROCEDURE  Procedure: MRI  Findings and Treatment: IMPRESSION: Small acute/subacute infarcts within the right cerebellar   hemisphere and left posterior centrum semiovale with associated cytotoxic   edema.  No acute intracranial hemorrhage.  Multiple patchy confluent nonspecific abnormal white matter foci of   T2/FLAIR prolongation statistically favoring microvascular type changes.  Additional small area of encephalomalacia and gliosis within the left   basal ganglia which may related to chronic infarction and/or hemorrhage.        SECONDARY PROCEDURE  Procedure: Ultrasound of veins of both lower extremities for deep vein thrombosis (DVT)  Findings and Treatment:   IMPRESSION:  Acute deep venous thrombosis: above and below the knee.      Procedure: Abdomen CT  Findings and Treatment: T12 fracture unchanged since 9/18/2024. L2 fracture is similar in   appearance to 10/4/2024.      Procedure: CT head  Findings and Treatment: 1.  Motion degraded exam without gross evidence of acute intracranial   hemorrhage.  2.  Chronic infarct and senescent cerebral changes.

## 2024-10-11 NOTE — OCCUPATIONAL THERAPY INITIAL EVALUATION ADULT - GENERAL OBSERVATIONS, REHAB EVAL
Upon entry, patient semi-supine in bed +NGT +02 via NC + tele +pulse ox +IV +condom cath, patient agreeable to OT eval, cleared for OT evaluation as per RN.

## 2024-10-11 NOTE — CONSULT NOTE ADULT - SUBJECTIVE AND OBJECTIVE BOX
Chief Complaint: dysphagia      HPI:  Jarrett Overton is a 70 yo M with PMH of CAD s/p CABG, CVA, HTN, and T2DM presenting for fall to [facility]. Patient managed for alcohol withdrawal and treated with librium. Patient then developed acute hypercapnic respiratory failure due to librium requiring intubation and treated with flumazenil Patient found on CTH to have subacute and chronic SDH. He was successfully extubated on 9/19. CTA showed large PE of main pulmonary artery. LE duplex showed b/l DVTs in L peroneal vein, L popliteal vein, L tibial vein, and L peroneal vein. Patient treated with eliquis then heparin gtt for PE/DVTs. Patient receiving empiric zosyn for possible aspiration pna (10/5-10/12).     Course c/b persistent dysphagia. GI consulted for PEG tube placement. FEES study shows uncontrolled spillage of material and repetitive silent aspiration in pharyngeal phase. Patient has no history of abdominal surgery and is agreeable to PEG tube placement. Patient breathing comfortably on 2 L NC.     Allergies:  No Known Allergies    Hospital Medications:  albuterol/ipratropium for Nebulization 3 milliLiter(s) Nebulizer every 6 hours PRN  aMIOdarone    Tablet 200 milliGRAM(s) Oral daily  amLODIPine   Tablet 10 milliGRAM(s) Oral daily  atorvastatin 40 milliGRAM(s) Oral at bedtime  dextrose 5%. 1000 milliLiter(s) IV Continuous <Continuous>  dextrose 5%. 1000 milliLiter(s) IV Continuous <Continuous>  dextrose 50% Injectable 12.5 Gram(s) IV Push once  dextrose 50% Injectable 25 Gram(s) IV Push once  dextrose 50% Injectable 25 Gram(s) IV Push once  dextrose Oral Gel 15 Gram(s) Oral once  fluticasone propionate/ salmeterol 250-50 MICROgram(s) Diskus 1 Dose(s) Inhalation two times a day  glucagon  Injectable 1 milliGRAM(s) IntraMuscular once  heparin  Infusion 1400 Unit(s)/Hr IV Continuous <Continuous>  insulin lispro (ADMELOG) corrective regimen sliding scale   SubCutaneous at bedtime  insulin lispro (ADMELOG) corrective regimen sliding scale   SubCutaneous three times a day before meals  metoprolol tartrate 25 milliGRAM(s) Oral two times a day  pantoprazole  Injectable 40 milliGRAM(s) IV Push daily  piperacillin/tazobactam IVPB.. 3.375 Gram(s) IV Intermittent every 8 hours  polyethylene glycol 3350 17 Gram(s) Oral daily  thiamine 100 milliGRAM(s) Oral daily      PMHX/PSHX:  HTN (hypertension)    T2DM (type 2 diabetes mellitus)    CVA (cerebrovascular accident)    Atrial flutter    CAD (coronary artery disease)    History of alcohol use disorder    History of repair of left hip joint        Family history:      Denies family history of colon cancer/polyps, stomach cancer/polyps, pancreatic cancer/masses, liver cancer/disease, ovarian cancer and endometrial cancer.    Social History:     Tob: Denies  EtOH: As above  Illicit Drugs: Denies    ROS:   General:  No wt loss, fevers, chills, night sweats, fatigue  Eyes:  Good vision, no reported pain  ENT:  No sore throat, pain, runny nose, dysphagia  CV:  No pain, palpitations, hypo/hypertension  Pulm:  No dyspnea, cough, tachypnea, wheezing  GI:  As per HPI  :  No pain, bleeding, incontinence, nocturia  Muscle:  No pain, weakness  Neuro:  No weakness, tingling, memory problems  Psych:  No fatigue, insomnia, mood problems, depression  Endocrine:  No polyuria, polydipsia, cold/heat intolerance  Heme:  No petechiae, ecchymosis, easy bruisability  Skin:  No rash, tattoos, scars, edema    PHYSICAL EXAM:   GENERAL:  No acute distress, dysphonic, on 2 L NC  HEENT:  Normocephalic/atraumatic, no scleral icterus  CHEST:  No accessory muscle use  HEART:  Regular rate and rhythm  ABDOMEN:  Soft, non-tender, non-distended, normoactive bowel sounds,  no surgical scar  EXTREMITIES: No cyanosis, clubbing, or edema  SKIN:  No rash  NEURO:  Alert and oriented x 3, no asterixis    Vital Signs:  Vital Signs Last 24 Hrs  T(C): 36.4 (11 Oct 2024 11:59), Max: 37.1 (10 Oct 2024 17:30)  T(F): 97.5 (11 Oct 2024 11:59), Max: 98.8 (10 Oct 2024 17:30)  HR: 67 (11 Oct 2024 11:59) (67 - 80)  BP: 101/69 (11 Oct 2024 11:59) (101/69 - 119/81)  BP(mean): --  RR: 18 (11 Oct 2024 11:59) (18 - 19)  SpO2: 100% (11 Oct 2024 11:59) (97% - 100%)    Parameters below as of 11 Oct 2024 11:59  Patient On (Oxygen Delivery Method): nasal cannula  O2 Flow (L/min): 3    Daily     Daily     LABS:                        10.4   5.61  )-----------( 261      ( 11 Oct 2024 05:46 )             32.5     Mean Cell Volume: 92.6 fl (10-11-24 @ 05:46)    10-11    140  |  99  |  8   ----------------------------<  148[H]  4.0   |  31  |  0.79    Ca    9.8      11 Oct 2024 05:46  Phos  4.5     10-11  Mg     2.0     10-11    TPro  7.2  /  Alb  3.5  /  TBili  0.4  /  DBili  x   /  AST  18  /  ALT  11  /  AlkPhos  102  10-11    LIVER FUNCTIONS - ( 11 Oct 2024 05:46 )  Alb: 3.5 g/dL / Pro: 7.2 g/dL / ALK PHOS: 102 U/L / ALT: 11 U/L / AST: 18 U/L / GGT: x           PTT - ( 10 Oct 2024 18:42 )  PTT:35.7 sec  Urinalysis Basic - ( 11 Oct 2024 05:46 )    Color: x / Appearance: x / SG: x / pH: x  Gluc: 148 mg/dL / Ketone: x  / Bili: x / Urobili: x   Blood: x / Protein: x / Nitrite: x   Leuk Esterase: x / RBC: x / WBC x   Sq Epi: x / Non Sq Epi: x / Bacteria: x                              10.4   5.61  )-----------( 261      ( 11 Oct 2024 05:46 )             32.5                         10.4   5.13  )-----------( 262      ( 10 Oct 2024 05:37 )             32.4                         9.6    6.01  )-----------( 268      ( 09 Oct 2024 05:51 )             30.6

## 2024-10-11 NOTE — PROGRESS NOTE ADULT - PROBLEM SELECTOR PLAN 1
- Hospital course at [facility] c/b AHRF  - CTA chest (10/4) -> Large PE of R main pulmonary artery  - US of b/l LEs -> R peroneal vein and L popliteal, tibial trunk, peroneal, and posterior tibial DVTs  - EKG showing evidence of right heart strain, S1Q3T3 changes, T wave depressions in precordial leads; not present on EKG from 9/15  - TTE (9/15) unremarkable  - Troponin 92, BNP 7168 on transfer  > CTH not acutely concerning  > C/w heparin gtt w/ target PTT-> 0.4-0.6 Repeat TTE not concerning  > Vascular cardiology consulted rec heparin for now and eventual transition to DOAC  - Neurosurg consulted for AC management, they said okay to PTT 60-80  - Transitioned to DOAC loading eliquis 10/8, maintenance dose 5mg bid 10/10 - Hospital course at [facility] c/b AHRF  - CTA chest (10/4) -> Large PE of R main pulmonary artery  - US of b/l LEs -> R peroneal vein and L popliteal, tibial trunk, peroneal, and posterior tibial DVTs  - EKG showing evidence of right heart strain, S1Q3T3 changes, T wave depressions in precordial leads; not present on EKG from 9/15  - TTE (9/15) unremarkable  - Troponin 92, BNP 7168 on transfer  > CTH not acutely concerning  > C/w heparin gtt w/ target PTT-> 0.4-0.6 Repeat TTE not concerning  > Vascular cardiology consulted rec heparin for now and eventual transition to DOAC  - Neurosurg consulted for AC management, they said okay to PTT 60-80  - Transitioned to DOAC loading eliquis but transitioned back to heparin pending PEG placement

## 2024-10-11 NOTE — OCCUPATIONAL THERAPY INITIAL EVALUATION ADULT - DIAGNOSIS, OT EVAL
decreased functional activity endurance, decreased strength, Impaired balance, impaired cognitive status, impacting ability to perform ADL and functional mobility.

## 2024-10-11 NOTE — CHART NOTE - NSCHARTNOTEFT_GEN_A_CORE
NUTRITION FOLLOW UP NOTE    PATIENT SEEN FOR: Enteral Nutrition Follow Up     SOURCE: [X] Patient  [x] Current Medical Record  [X] RN  [] Family/support person at bedside  [] Patient unavailable/inappropriate  [] Other:    CHART REVIEWED/EVENTS NOTED.  [] No changes to nutrition care plan to note  [X] Nutrition Status:  - Stroke (10/8); per 10/8 MRI brain  - Significant dysphagia s/p extubation (). S/p FEES (10/10), speech language pathologist recommended continuing NPO with non-oral means of nutrition.   - Noted ongoing GOC conversation; per sandeep, pt does not want PEG tube. Noted pending consult for ENT/GI    DIET ORDER:   Diet, NPO with Tube Feed:   Tube Feeding Modality: Nasogastric  Glucerna 1.2 Ronaldo (GLUCERNARTH)  Total Volume for 24 Hours (mL): 1440  Continuous  Starting Tube Feed Rate {mL per Hour}: 10  Increase Tube Feed Rate by (mL): 10     Every 12 hours  Until Goal Tube Feed Rate (mL per Hour): 60  Tube Feed Duration (in Hours): 24  Tube Feed Start Time: 13:00 (10-08-24)      CURRENT DIET ORDER IS:  [x] Appropriate:  [] Inadequate:  [] Other:    NUTRITION INTAKE/PROVISION:  [] PO:  [X] Enteral Nutrition:  - Pt endorsing feeling hungry and wants to eat; reports tolerating current EN regimen via NG tube with Glucerna 1.2 @ 60 mL/hr. Noted with nasal bridal.   - EN Regimen at goal provides 1440mL total volume, 1728 kcal/day, 86 g pro/day, 1159 mL free water/day; provides 25.5 kcal/kg and 1.27 g/kg; based on current dosing weight 67.7 kg.     Protein Modular(s) Provides: N/A    Current Pump Rate: 60 mL/hr  Unable to assess EN provision secondary to feeds held from 10/6 - 10/8; restarted on 10/8 and were being titrated upwards to goal rate x last 2 days.   [] Parenteral Nutrition:    ANTHROPOMETRICS:  Drug Dosing Weight  Height (cm): 175.3 (per [facility])  Weight (kg): 67.7 (04 Oct 2024 22:19)  BMI (kg/m^2): 22.1 (based on updated height)    Weights:   Daily wts in k.7 (10/4, bed). No further wts available at this time; bedscale not working.   Per David FUCHS, wt hx in k.6 (24). Possible wt loss of 17.% x < 2 mos (based on wts from  and 10/4); obtain updated wt as able.   RD will continue to monitor wt trends as able/available.     NUTRITIONALLY PERTINENT MEDICATIONS:  MEDICATIONS  (STANDING):  aMIOdarone    Tablet  amLODIPine   Tablet  atorvastatin  dextrose 5%.  dextrose 5%.  dextrose 50% Injectable  dextrose 50% Injectable  dextrose 50% Injectable  dextrose Oral Gel  glucagon  Injectable  insulin lispro (ADMELOG) corrective regimen sliding scale  insulin lispro (ADMELOG) corrective regimen sliding scale  metoprolol tartrate  pantoprazole  Injectable  piperacillin/tazobactam IVPB..  polyethylene glycol 3350  thiamine       NUTRITIONALLY PERTINENT LABS:  10-11 Na140 mmol/L Glu 148 mg/dL[H] K+ 4.0 mmol/L Cr  0.79 mg/dL BUN 8 mg/dL 10-11 Phos 4.5 mg/dL 10-11 Alb 3.5 g/dL 10-10 Chol 159 mg/dL LDL --    HDL 34 mg/dL[L] Trig 114 mg/dL10-11 ALT 11 U/L AST 18 U/L Alkaline Phosphatase 102 U/L  10-05-24 @ 14:44 a1c 6.1    A1C with Estimated Average Glucose Result: 6.1 % (10-05-24 @ 14:44)      Finger Sticks:  POCT Blood Glucose.: 159 mg/dL (10-11 @ 11:43)  POCT Blood Glucose.: 173 mg/dL (10-11 @ 09:16)  POCT Blood Glucose.: 158 mg/dL (10-10 @ 23:44)  POCT Blood Glucose.: 142 mg/dL (10-10 @ 18:29)      NUTRITIONALLY PERTINENT MEDICATIONS/LABS:  [x] Reviewed  [x] Relevant notes on medications/labs:  - Updated A1C of 6.1% (10/5)- fair glycemic control. POCTs x 24 hrs WNL; ordered for SSI.   - Ordered for thiamine.   - Ordered for Abx.     EDEMA:  [x] Reviewed  [] Relevant notes:    GI/ I&O:  [x] Reviewed  [X] Relevant notes: No N/V nor diarrhea reported; last BM 10/10 (+2) per chart. Ordered for Miralax.   [X] Other: Ordered for PPI.     SKIN:   [x] No pressure injuries documented, per nursing flowsheet  [] Pressure injury previously noted  [] Change in pressure injury documentation:  [] Other:    ESTIMATED NEEDS:  [x] No change:  [] Updated:  Energy:  1692 - 2031 kcal/day (25 - 30 kcal/kg)  Protein:  67.7 - 81 g/day (1 - 1.2 g/kg)  Fluid:   ml/day or [x] defer to team  Based on: dosing wt of 67.7 kg (10/4)    NUTRITION DIAGNOSIS:  [X] Prior Dx: (1) Inadequate Protein Energy Intake   [] New Dx:    EDUCATION:  [] Yes:  [X] Not appropriate/warranted    NUTRITION CARE PLAN:  [X] Continue with current EN regimen via NG tube: Glucerna 1.2 @ 60 mL/hr x 24 hrs; EN Regimen at goal provides 1440mL total volume, 1728 kcal/day, 86 g pro/day, 1159 mL free water/day; provides 25.5 kcal/kg and 1.27 g/kg; based on current dosing weight 67.7 kg. Defer free water flushes to team.           [X] RD remains available upon request for TF formulary/rate adjustments prn.   [X] Consider adding multivitamin and folic acid secondary to hx of EtOH use; continue with thiamine as medically feasible.   [X] Continue to address pt's/family's GOC regarding long term nutrition support as medically appropriate/able.    [] Achieved - Continue current nutrition intervention(s)  [] Current medical condition precludes nutrition intervention at this time.    MONITORING AND EVALUATION:   RD remains available upon request and will follow up per protocol.    Jessica Villegas RD  Available on MS TEAMS NUTRITION FOLLOW UP NOTE    PATIENT SEEN FOR: Enteral Nutrition Follow Up     SOURCE: [X] Patient  [x] Current Medical Record  [X] RN  [] Family/support person at bedside  [] Patient unavailable/inappropriate  [] Other:    CHART REVIEWED/EVENTS NOTED.  [] No changes to nutrition care plan to note  [X] Nutrition Status:  - Stroke (10/8); per 10/8 MRI brain  - Significant dysphagia s/p extubation (). S/p FEES (10/10), speech language pathologist recommended continuing NPO with non-oral means of nutrition.   - Noted ongoing GOC conversation; per sandeep, pt does not want PEG tube. Noted pending consult for ENT/GI    DIET ORDER:   Diet, NPO with Tube Feed:   Tube Feeding Modality: Nasogastric  Glucerna 1.2 Ronaldo (GLUCERNARTH)  Total Volume for 24 Hours (mL): 1440  Continuous  Starting Tube Feed Rate {mL per Hour}: 10  Increase Tube Feed Rate by (mL): 10     Every 12 hours  Until Goal Tube Feed Rate (mL per Hour): 60  Tube Feed Duration (in Hours): 24  Tube Feed Start Time: 13:00 (10-08-24)      CURRENT DIET ORDER IS:  [x] Appropriate:  [] Inadequate:  [] Other:    NUTRITION INTAKE/PROVISION:  [] PO:  [X] Enteral Nutrition:  - Noted pt with fluctuating diet status of NPO and EN feeds iso testing/NG tube placements since 10/4.   - Pt endorsing feeling hungry and wants to eat; reports tolerating current EN regimen via NG tube with Glucerna 1.2 @ 60 mL/hr. Noted with nasal bridal.   - EN Regimen at goal provides 1440mL total volume, 1728 kcal/day, 86 g pro/day, 1159 mL free water/day; provides 25.5 kcal/kg and 1.27 g/kg; based on current dosing weight 67.7 kg.     Protein Modular(s) Provides: N/A    Current Pump Rate: 60 mL/hr  Unable to assess EN provision secondary to feeds held from 10/7 - 10/8; restarted and were being titrated upwards to goal rate x last 2 days. Suspected inadequate EN provision < 75% x > 7 days based on fluctuating diet order status.   [] Parenteral Nutrition:    ANTHROPOMETRICS:  Drug Dosing Weight  Height (cm): 175.3 (per [facility])  Weight (kg): 67.7 (04 Oct 2024 22:19)  BMI (kg/m^2): 22.1 (based on updated height)    Weights:   Daily wts in k.2 (10/11, RD obtained bedscale), 67.7 (10/4, bed).   Per David FUCHS, wt hx in k.6 (24 - ? accuracy). Possible wt loss of 17.% x < 2 mos (based on wts from  and 10/4 - ? complete accuracy of sources of wts) vs acute wt loss of 2.2% x ~1 week secondary to combination of inadequate EN provision and NPO status (based on wts from 10/4 and 10/11).   RD will continue to monitor wt trends as able/available.     NUTRITIONALLY PERTINENT MEDICATIONS:  MEDICATIONS  (STANDING):  aMIOdarone    Tablet  amLODIPine   Tablet  atorvastatin  dextrose 5%.  dextrose 5%.  dextrose 50% Injectable  dextrose 50% Injectable  dextrose 50% Injectable  dextrose Oral Gel  glucagon  Injectable  insulin lispro (ADMELOG) corrective regimen sliding scale  insulin lispro (ADMELOG) corrective regimen sliding scale  metoprolol tartrate  pantoprazole  Injectable  piperacillin/tazobactam IVPB..  polyethylene glycol 3350  thiamine       NUTRITIONALLY PERTINENT LABS:  10-11 Na140 mmol/L Glu 148 mg/dL[H] K+ 4.0 mmol/L Cr  0.79 mg/dL BUN 8 mg/dL 10-11 Phos 4.5 mg/dL 10-11 Alb 3.5 g/dL 10-10 Chol 159 mg/dL LDL --    HDL 34 mg/dL[L] Trig 114 mg/dL10-11 ALT 11 U/L AST 18 U/L Alkaline Phosphatase 102 U/L  10-05-24 @ 14:44 a1c 6.1    A1C with Estimated Average Glucose Result: 6.1 % (10-05-24 @ 14:44)      Finger Sticks:  POCT Blood Glucose.: 159 mg/dL (10-11 @ 11:43)  POCT Blood Glucose.: 173 mg/dL (10-11 @ 09:16)  POCT Blood Glucose.: 158 mg/dL (10-10 @ 23:44)  POCT Blood Glucose.: 142 mg/dL (10-10 @ 18:29)      NUTRITIONALLY PERTINENT MEDICATIONS/LABS:  [x] Reviewed  [x] Relevant notes on medications/labs:  - Updated A1C of 6.1% (10/5)- fair glycemic control. POCTs x 24 hrs WNL; ordered for SSI.   - Ordered for thiamine.   - Ordered for Abx.     EDEMA:  [x] Reviewed  [] Relevant notes:    GI/ I&O:  [x] Reviewed  [X] Relevant notes: No N/V nor diarrhea reported; last BM 10/10 (+2) per chart. Ordered for Miralax.   [X] Other: Ordered for PPI.     SKIN:   [x] No pressure injuries documented, per nursing flowsheet  [] Pressure injury previously noted  [] Change in pressure injury documentation:  [] Other:    ESTIMATED NEEDS:  [x] No change:  [] Updated:  Energy:  1692 - 2031 kcal/day (25 - 30 kcal/kg)  Protein:  67.7 - 81 g/day (1 - 1.2 g/kg)  Fluid:   ml/day or [x] defer to team  Based on: dosing wt of 67.7 kg (10/4)    NUTRITION DIAGNOSIS:  [X] Prior Dx: (1) Inadequate Protein Energy Intake (upgraded)  [X] New Dx: Moderate protein calorie malnutrition in context of acute illness related to inability to meet estimated nutrient needs as evidenced by <75% EER x > 7 days, wt loss > 2% x 1 week  Goal: Pt will consume >/= 75% of estimated nutrient needs via best tolerated route.     EDUCATION:  [] Yes:  [X] Not appropriate/warranted    NUTRITION CARE PLAN:  [X] Continue with current EN regimen via NG tube: Glucerna 1.2 @ 60 mL/hr x 24 hrs; EN Regimen at goal provides 1440mL total volume, 1728 kcal/day, 86 g pro/day, 1159 mL free water/day; provides 25.5 kcal/kg and 1.27 g/kg; based on current dosing weight 67.7 kg. Defer free water flushes to team.           [X] RD remains available upon request for TF formulary/rate adjustments prn.   [X] Consider adding multivitamin and folic acid secondary to hx of EtOH use; continue with thiamine as medically feasible.   [X] Continue to address pt's/family's GOC regarding long term nutrition support as medically appropriate/able.  [X] malnutrition sticker placed In chart    [] Achieved - Continue current nutrition intervention(s)  [] Current medical condition precludes nutrition intervention at this time.    MONITORING AND EVALUATION:   RD remains available upon request and will follow up per protocol.    Jessica Villegas RD  Available on MS TEAMS

## 2024-10-11 NOTE — PROGRESS NOTE ADULT - SUBJECTIVE AND OBJECTIVE BOX
PROGRESS NOTE:   Authored by: Stephanie Pedroza MD  PGY-1    Patient is a 69y old  Male who presents with a chief complaint of Pulmonary Embolism (10 Oct 2024 07:22)      SUBJECTIVE / OVERNIGHT EVENTS:  No acute events overnight. Pt doing well this morning.    MEDICATIONS  (STANDING):  aMIOdarone    Tablet 200 milliGRAM(s) Oral daily  amLODIPine   Tablet 10 milliGRAM(s) Oral daily  atorvastatin 80 milliGRAM(s) Oral at bedtime  dextrose 5%. 1000 milliLiter(s) (100 mL/Hr) IV Continuous <Continuous>  dextrose 5%. 1000 milliLiter(s) (50 mL/Hr) IV Continuous <Continuous>  dextrose 50% Injectable 12.5 Gram(s) IV Push once  dextrose 50% Injectable 25 Gram(s) IV Push once  dextrose 50% Injectable 25 Gram(s) IV Push once  dextrose Oral Gel 15 Gram(s) Oral once  fluticasone propionate/ salmeterol 250-50 MICROgram(s) Diskus 1 Dose(s) Inhalation two times a day  glucagon  Injectable 1 milliGRAM(s) IntraMuscular once  heparin  Infusion 1400 Unit(s)/Hr (14 mL/Hr) IV Continuous <Continuous>  insulin lispro (ADMELOG) corrective regimen sliding scale   SubCutaneous three times a day before meals  insulin lispro (ADMELOG) corrective regimen sliding scale   SubCutaneous at bedtime  metoprolol tartrate 25 milliGRAM(s) Oral two times a day  pantoprazole  Injectable 40 milliGRAM(s) IV Push daily  piperacillin/tazobactam IVPB.. 3.375 Gram(s) IV Intermittent every 8 hours  polyethylene glycol 3350 17 Gram(s) Oral daily  thiamine 100 milliGRAM(s) Oral daily    MEDICATIONS  (PRN):  albuterol/ipratropium for Nebulization 3 milliLiter(s) Nebulizer every 6 hours PRN Shortness of Breath and/or Wheezing      CAPILLARY BLOOD GLUCOSE      POCT Blood Glucose.: 173 mg/dL (11 Oct 2024 09:16)  POCT Blood Glucose.: 158 mg/dL (10 Oct 2024 23:44)  POCT Blood Glucose.: 142 mg/dL (10 Oct 2024 18:29)  POCT Blood Glucose.: 166 mg/dL (10 Oct 2024 12:17)    I&O's Summary    10 Oct 2024 07:01  -  11 Oct 2024 07:00  --------------------------------------------------------  IN: 0 mL / OUT: 1350 mL / NET: -1350 mL        PHYSICAL EXAM:  Vital Signs Last 24 Hrs  T(C): 36.4 (11 Oct 2024 09:06), Max: 37.1 (10 Oct 2024 17:30)  T(F): 97.6 (11 Oct 2024 09:06), Max: 98.8 (10 Oct 2024 17:30)  HR: 80 (11 Oct 2024 09:06) (71 - 80)  BP: 105/76 (11 Oct 2024 09:06) (105/68 - 119/81)  BP(mean): --  RR: 18 (11 Oct 2024 09:06) (18 - 19)  SpO2: 97% (11 Oct 2024 09:06) (97% - 100%)    Parameters below as of 11 Oct 2024 09:06  Patient On (Oxygen Delivery Method): nasal cannula        CONSTITUTIONAL: NAD, well-developed  RESPIRATORY: Normal respiratory effort; lungs are clear to auscultation bilaterally  CARDIOVASCULAR: Regular rate and rhythm, normal S1 and S2, no murmur/rub/gallop; Peripheral pulses are 2+ bilaterally  ABDOMEN: Nontender to palpation, normoactive bowel sounds, no rebound/guarding  MUSCLOSKELETAL:  Moving all limbs spontaneously; No lower extremity edema  PSYCH: Affect appropriate    LABS:                        10.4   5.61  )-----------( 261      ( 11 Oct 2024 05:46 )             32.5     10-11    140  |  99  |  8   ----------------------------<  148[H]  4.0   |  31  |  0.79    Ca    9.8      11 Oct 2024 05:46  Phos  4.5     10-11  Mg     2.0     10-11    TPro  7.2  /  Alb  3.5  /  TBili  0.4  /  DBili  x   /  AST  18  /  ALT  11  /  AlkPhos  102  10-11    PTT - ( 10 Oct 2024 18:42 )  PTT:35.7 sec        MICRO:  Urinalysis Basic - ( 11 Oct 2024 05:46 )    Color: x / Appearance: x / SG: x / pH: x  Gluc: 148 mg/dL / Ketone: x  / Bili: x / Urobili: x   Blood: x / Protein: x / Nitrite: x   Leuk Esterase: x / RBC: x / WBC x   Sq Epi: x / Non Sq Epi: x / Bacteria: x          IMAGING: PROGRESS NOTE:   Authored by: Stephanie Pedroza MD  PGY-1    Patient is a 69y old  Male who presents with a chief complaint of Pulmonary Embolism (10 Oct 2024 07:22)      SUBJECTIVE / OVERNIGHT EVENTS:  No acute events overnight. Pt doing well this morning. Pt amenable to having PEG placement.    MEDICATIONS  (STANDING):  aMIOdarone    Tablet 200 milliGRAM(s) Oral daily  amLODIPine   Tablet 10 milliGRAM(s) Oral daily  atorvastatin 80 milliGRAM(s) Oral at bedtime  dextrose 5%. 1000 milliLiter(s) (100 mL/Hr) IV Continuous <Continuous>  dextrose 5%. 1000 milliLiter(s) (50 mL/Hr) IV Continuous <Continuous>  dextrose 50% Injectable 12.5 Gram(s) IV Push once  dextrose 50% Injectable 25 Gram(s) IV Push once  dextrose 50% Injectable 25 Gram(s) IV Push once  dextrose Oral Gel 15 Gram(s) Oral once  fluticasone propionate/ salmeterol 250-50 MICROgram(s) Diskus 1 Dose(s) Inhalation two times a day  glucagon  Injectable 1 milliGRAM(s) IntraMuscular once  heparin  Infusion 1400 Unit(s)/Hr (14 mL/Hr) IV Continuous <Continuous>  insulin lispro (ADMELOG) corrective regimen sliding scale   SubCutaneous three times a day before meals  insulin lispro (ADMELOG) corrective regimen sliding scale   SubCutaneous at bedtime  metoprolol tartrate 25 milliGRAM(s) Oral two times a day  pantoprazole  Injectable 40 milliGRAM(s) IV Push daily  piperacillin/tazobactam IVPB.. 3.375 Gram(s) IV Intermittent every 8 hours  polyethylene glycol 3350 17 Gram(s) Oral daily  thiamine 100 milliGRAM(s) Oral daily    MEDICATIONS  (PRN):  albuterol/ipratropium for Nebulization 3 milliLiter(s) Nebulizer every 6 hours PRN Shortness of Breath and/or Wheezing      CAPILLARY BLOOD GLUCOSE      POCT Blood Glucose.: 173 mg/dL (11 Oct 2024 09:16)  POCT Blood Glucose.: 158 mg/dL (10 Oct 2024 23:44)  POCT Blood Glucose.: 142 mg/dL (10 Oct 2024 18:29)  POCT Blood Glucose.: 166 mg/dL (10 Oct 2024 12:17)    I&O's Summary    10 Oct 2024 07:01  -  11 Oct 2024 07:00  --------------------------------------------------------  IN: 0 mL / OUT: 1350 mL / NET: -1350 mL        PHYSICAL EXAM:  Vital Signs Last 24 Hrs  T(C): 36.4 (11 Oct 2024 09:06), Max: 37.1 (10 Oct 2024 17:30)  T(F): 97.6 (11 Oct 2024 09:06), Max: 98.8 (10 Oct 2024 17:30)  HR: 80 (11 Oct 2024 09:06) (71 - 80)  BP: 105/76 (11 Oct 2024 09:06) (105/68 - 119/81)  BP(mean): --  RR: 18 (11 Oct 2024 09:06) (18 - 19)  SpO2: 97% (11 Oct 2024 09:06) (97% - 100%)    Parameters below as of 11 Oct 2024 09:06  Patient On (Oxygen Delivery Method): nasal cannula    CONSTITUTIONAL: NAD, well-developed, AOx1  RESPIRATORY: Normal respiratory effort; lungs are clear to auscultation bilaterally  CARDIOVASCULAR: Regular rate and rhythm, normal S1 and S2, no murmur/rub/gallop; Peripheral pulses are 2+ bilaterally  ABDOMEN: Nontender to palpation, normoactive bowel sounds, no rebound/guarding  MUSCLOSKELETAL:  Moving all limbs spontaneously; No lower extremity edema    LABS:                        10.4   5.61  )-----------( 261      ( 11 Oct 2024 05:46 )             32.5     10-11    140  |  99  |  8   ----------------------------<  148[H]  4.0   |  31  |  0.79    Ca    9.8      11 Oct 2024 05:46  Phos  4.5     10-11  Mg     2.0     10-11    TPro  7.2  /  Alb  3.5  /  TBili  0.4  /  DBili  x   /  AST  18  /  ALT  11  /  AlkPhos  102  10-11    PTT - ( 10 Oct 2024 18:42 )  PTT:35.7 sec        MICRO:  Urinalysis Basic - ( 11 Oct 2024 05:46 )    Color: x / Appearance: x / SG: x / pH: x  Gluc: 148 mg/dL / Ketone: x  / Bili: x / Urobili: x   Blood: x / Protein: x / Nitrite: x   Leuk Esterase: x / RBC: x / WBC x   Sq Epi: x / Non Sq Epi: x / Bacteria: x          IMAGING: no new

## 2024-10-11 NOTE — DISCHARGE NOTE PROVIDER - NSDCFUADDAPPT_GEN_ALL_CORE_FT
APPTS ARE READY TO BE MADE: [ ] YES    Best Family or Patient Contact (if needed):    Additional Information about above appointments (if needed):    1: Please follow up with PCP within one week  2: Please follow up with gastroenterology within one week  3: Please follow up with nuerologist within one week    Other comments or requests:    APPTS ARE READY TO BE MADE: [ ] YES    Best Family or Patient Contact (if needed):    Additional Information about above appointments (if needed):    1: Please follow up with PCP within one week  2: Please follow up with gastroenterology within one week  3: Please follow up with neurologist within one week    Other comments or requests:    APPTS ARE READY TO BE MADE: [ ] YES    Best Family or Patient Contact (if needed):    Additional Information about above appointments (if needed):    1: PCP  2: GI  3: Neurologist     Other comments or requests:    APPTS ARE READY TO BE MADE: [ ] YES    Best Family or Patient Contact (if needed):    Additional Information about above appointments (if needed):    1: PCP  2: GI  3: Neurologist   4. Orthopedic surgery     Other comments or requests:

## 2024-10-11 NOTE — DISCHARGE NOTE PROVIDER - INSTRUCTIONS
TF Tube feeds with Glucerna 1.5 Ca starting rate 40cc/hr; increase by 10cc every 4 hours until goal tube rate of 60cc/hr reached.  Supplement with Juvin twice daily

## 2024-10-11 NOTE — DISCHARGE NOTE PROVIDER - NSFOLLOWUPCLINICS_GEN_ALL_ED_FT
[facility] [place]  [facility]  .  [place]   Phone: [phone]  Fax:

## 2024-10-11 NOTE — CONSULT NOTE ADULT - ASSESSMENT
68 yo M with PMH of CAD s/p CABG, CVA, HTN, and T2DM presenting for fall to [facility], found to have alcohol withdrawal with course c/b acute hypercapnic respiratory failure s/p extubation and b/l PEs and DVT on heparin gtt. GI consulted for dysphagia.     Impression:  #Dysphagia    GI consulted for PEG tube placement for oropharyngeal dysphagia. FEES showed uncontrolled spillage of material and pharyngeal stage with repetitive silent aspiration.     - Tentative plan for PEG tube placement Tuesday  - Obtain cardiac clearance for procedure  - Keep patient NPO MN night before procedure  - Hold heparin gtt 6 hours before procedure  - Maintain NGT in place for feeds    All recommendations are tentative until note is attested by attending.     Venita Proctor, PGY4  Gastroenterology/Hepatology Fellow  Available on [employer] Teams  [phone] (Long Range Pager)  [phone] (Short Range Pager [facility]IC)    After 5 pm, please contact the on-call GI fellow for any urgent issues via the Hospital Call

## 2024-10-11 NOTE — PROGRESS NOTE ADULT - PROBLEM SELECTOR PLAN 3
- US of b/l LEs at [facility] -> R peroneal vein and L popliteal, tibial trunk, peroneal, and posterior tibial DVTs  > Repeat US of b/l LEs showing mulitple DVTs  > C/w eliquis, as above - US of b/l LEs at [facility] -> R peroneal vein and L popliteal, tibial trunk, peroneal, and posterior tibial DVTs  > Repeat US of b/l LEs showing mulitple DVTs  > C/w heparin as above

## 2024-10-11 NOTE — PROGRESS NOTE ADULT - ASSESSMENT
Mr. Jarrett Overton is a 69-year-old w/ PMH of HTN, T2DM, CVA, A. flutter, CAD s/p CABG x3 (~2018), and alcohol use disorder who initially presented to [facility] on 9/14 for a mechanical fall. On 10/4, patient was found to be hypoxic with subsequent CTA chest showing large PE of R main pulmonary artery. US of b/l LEs was further significant for b/l DVTs in the R peroneal vein and L popliteal, tibial trunk, peroneal, and posterior tibial veins. Patient was transferred to [facility] for further management of PE and embolectomy evaluation.

## 2024-10-11 NOTE — DISCHARGE NOTE PROVIDER - NSDCMRMEDTOKEN_GEN_ALL_CORE_FT
acetaminophen 325 mg oral tablet: 2 tab(s) orally every 6 hours as needed for  mild pain  amiodarone 200 mg oral tablet: 1 tab(s) orally once a day  amLODIPine 5 mg oral tablet: 1 tab(s) orally once a day  budesonide-formoterol 160 mcg-4.5 mcg/inh inhalation aerosol: 2 puff(s) inhaled 2 times a day  cyanocobalamin 250 mcg oral tablet: 1 tab(s) orally once a day  Eliquis 5 mg oral tablet: 1 tab(s) orally 2 times a day  ergocalciferol 1.25 mg (50,000 intl units) oral tablet: 1 tab(s) orally once a week  esomeprazole 20 mg oral delayed release capsule: 1 cap(s) orally once a day  heparin 25,000 units/mL injectable solution: injectable 18 u/kg/hr  Insulin Aspart: sliding scale  metoprolol tartrate 25 mg oral tablet: 1 tab(s) orally every 12 hours  MiraLax oral powder for reconstitution: 17 gram(s) orally once a day  morphine 1 mg/mL injectable solution: 1 milligram(s) intravenously every 6 hours as needed for  moderate pain  thiamine 100 mg oral tablet: 1 tab(s) orally once a day  Zosyn 4 g-0.5 g/100 mL intravenous solution: 4.5 gram(s) intravenously every 8 hours   acetaminophen 325 mg oral tablet: 2 tab(s) orally every 6 hours as needed for  mild pain  amiodarone 200 mg oral tablet: 1 tab(s) orally once a day  amLODIPine 10 mg oral tablet: 1 tab(s) orally once a day  atorvastatin 40 mg oral tablet: 1 tab(s) orally once a day (at bedtime)  budesonide-formoterol 160 mcg-4.5 mcg/inh inhalation aerosol: 2 puff(s) inhaled 2 times a day  cyanocobalamin 250 mcg oral tablet: 1 tab(s) orally once a day  Eliquis 5 mg oral tablet: 1 tab(s) orally 2 times a day  ergocalciferol 1.25 mg (50,000 intl units) oral tablet: 1 tab(s) orally once a week  esomeprazole 20 mg oral delayed release capsule: 1 cap(s) orally once a day  metoprolol tartrate 25 mg oral tablet: 1 tab(s) orally every 12 hours  MiraLax oral powder for reconstitution: 17 gram(s) orally once a day  thiamine 100 mg oral tablet: 1 tab(s) orally once a day

## 2024-10-11 NOTE — DISCHARGE NOTE PROVIDER - NSDCCPCAREPLAN_GEN_ALL_CORE_FT
PRINCIPAL DISCHARGE DIAGNOSIS  Diagnosis: Pulmonary embolism  Assessment and Plan of Treatment: You came to the hospital for a clot in your lungs. We gave you medication to treat that clot. We gave you antibiotics to treat an infection in your lungs. Please reach out to your primary care provider (PCP) for any medication refills. Please see your PCP within 1 week of discharge. Please return to the hospital if you have fevers or shaking chills, if you have chest pain or shortness of breath that do not quickly resolve, if you pass out, if you have bleeding, or if you have any other symptoms that worry you. It was a pleasure treating you.       PRINCIPAL DISCHARGE DIAGNOSIS  Diagnosis: Pulmonary embolism  Assessment and Plan of Treatment: You came to the hospital for a clot in your lungs. We gave you medication to treat that clot. We gave you antibiotics to treat an infection in your lungs. Please reach out to your primary care provider (PCP) for any medication refills. Please see your PCP within 1 week of discharge. Please return to the hospital if you have fevers or shaking chills, if you have chest pain or shortness of breath that do not quickly resolve, if you pass out, if you have bleeding, or if you have any other symptoms that worry you. It was a pleasure treating you.        SECONDARY DISCHARGE DIAGNOSES  Diagnosis: S/P percutaneous endoscopic gastrostomy (PEG) tube placement  Assessment and Plan of Treatment: We placed a PEG tube for you during this admission. The problem is that you were unable to tolerate foods by mouth. When patient's like yourself ar at risk of inhaling food particles by accident, they can develop serious infections in their lungs. The PEG tube is a tube that you will be able to give food to yourself and avoid that risk of inhaling food particles and so that long term you can still allow your stomacht o process food. Many patients do well on PEG tube foods, but just watch out when you are at home to make sure that you are still having appropriate bowel movements and make sure that that foods in your stomach dont end up coming back up through your mouth.     PRINCIPAL DISCHARGE DIAGNOSIS  Diagnosis: Pulmonary embolism  Assessment and Plan of Treatment: You came to the hospital for a large clot in your lung. You were also found to have other clots around your body. These are dangerous because they can prevent blood from flowing to the places where they normally go, but also because the clots can move around and lead to possible strokes in your brain and impede blood flow to arms or legs. You were transferred to [facility] because we evaluated whether or not you needed to have the clot in your lung mechanically removed. We concluded that you did not need in removed in that manner, rather, we made your blood thinner with medication in order to dissolve the clots. One of the medications we gave you was heparin through your IV, but while you are outside of the hospital, we will be giving your the medication Eliquis 5 mg twice daily to take through your PEG tube. It's important to stay on this medication to continue dissolving the clots. If you have an increasingly terrible headache, drastic changes in your vision, excruciating pain in any of your limbs, or excruciating abdominal pain, please seek a medical professional's help immediately.         SECONDARY DISCHARGE DIAGNOSES  Diagnosis: S/P percutaneous endoscopic gastrostomy (PEG) tube placement  Assessment and Plan of Treatment: We placed a PEG tube for you during this admission. The problem is that you were unable to tolerate foods by mouth. When patient's like yourself ar at risk of inhaling food particles by accident, they can develop serious infections in their lungs. The PEG tube is a tube that you will be able to give food to yourself and avoid that risk of inhaling food particles and so that long term you can still allow your stomacht o process food. Many patients do well on PEG tube foods, but just watch out when you are at home to make sure that you are still having appropriate bowel movements and make sure that that foods in your stomach dont end up coming back up through your mouth.     PRINCIPAL DISCHARGE DIAGNOSIS  Diagnosis: Pulmonary embolism  Assessment and Plan of Treatment: You came to the hospital for a large clot in your lung. You were also found to have other clots around your body. These are dangerous because they can prevent blood from flowing to the places where they normally go, but also because the clots can move around and lead to possible strokes in your brain and impede blood flow to arms or legs. You were transferred to [facility] because we evaluated whether or not you needed to have the clot in your lung mechanically removed. We concluded that you did not need in removed in that manner, rather, we made your blood thinner with medication in order to dissolve the clots. One of the medications we gave you was heparin through your IV, but while you are outside of the hospital, we will be giving your the medication Eliquis 5 mg twice daily to take through your PEG tube. It's important to stay on this medication to continue dissolving the clots. If you have an increasingly terrible headache, drastic changes in your vision, excruciating pain in any of your limbs, or excruciating abdominal pain, please seek a medical professional's help immediately.         SECONDARY DISCHARGE DIAGNOSES  Diagnosis: S/P percutaneous endoscopic gastrostomy (PEG) tube placement  Assessment and Plan of Treatment: We placed a PEG tube for you during this admission. The problem is that you were unable to tolerate foods by mouth. When patient's like yourself ar at risk of inhaling food particles by accident, they can develop serious infections in their lungs. The PEG tube is a tube that you will be able to give food to yourself and avoid that risk of inhaling food particles and so that long term you can still allow your stomacht o process food. Many patients do well on PEG tube foods, but just watch out when you are at home to make sure that you are still having appropriate bowel movements and make sure that that foods in your stomach dont end up coming back up through your mouth.    Diagnosis: Humeral fracture  Assessment and Plan of Treatment: You suffered a fall last month. He have a fracture in your right arm in the upper bone called the humerus. This fracture required orthopedic surgery to intervene and fix the fracture with metal screws and possibly a mandy or other interventions they would require to fix its position for it to be properly aligned. This surgery was postponed because of the clots found in your lung and in your lower legs. These clots are dangerous to your well being especially if you would need to go under anesthesia for surgery. Once you are in a better health, please follow up with the recommendations from orthopedic surgery to fix your right arm once your circumstances improve.    Diagnosis: History of subdural hematoma  Assessment and Plan of Treatment: Once you were in the MICU at [facility], you were found to have collections of blood called hematomas inside of your head. There was concern for continued bleeds and whether or not these hematomas were dangerous to your health, but on repeat imaging, the hematomas were found to be stable. They are likely still in your head and the images you get of your head after will likely show the old bleeds, but you should still follow up with your neurologist. If you have an increased headache, changes in your vision, or any other changes that make you concerned, please seek medical attention immediately.     PRINCIPAL DISCHARGE DIAGNOSIS  Diagnosis: Pulmonary embolism  Assessment and Plan of Treatment: You came to the hospital for a large clot in your lung. You were also found to have other clots around your body. These are dangerous because they can prevent blood from flowing to the places where they normally go, but also because the clots can move around and lead to possible strokes in your brain and impede blood flow to arms or legs. You were transferred to [facility] because we evaluated whether or not you needed to have the clot in your lung mechanically removed. We concluded that you did not need in removed in that manner, rather, we made your blood thinner with medication in order to dissolve the clots. One of the medications we gave you was heparin through your IV, but while you are outside of the hospital, we will be giving your the medication Eliquis 5 mg twice daily to take through your PEG tube. It's important to stay on this medication to continue dissolving the clots. If you have an increasingly terrible headache, drastic changes in your vision, excruciating pain in any of your limbs, or excruciating abdominal pain, please seek a medical professional's help immediately.         SECONDARY DISCHARGE DIAGNOSES  Diagnosis: S/P percutaneous endoscopic gastrostomy (PEG) tube placement  Assessment and Plan of Treatment: We placed a PEG tube for you during this admission. The problem is that you were unable to tolerate foods by mouth. When patient's like yourself are at risk of inhaling food particles by accident, they can develop serious infections in their lungs. The PEG tube is a tube that you will be able to give food to yourself and avoid that risk of inhaling food particles and so that long term you can still allow your stomach to process food. Many patients do well on PEG tube feeds, but please follow up with your primary care doctor for routine follow up.    Diagnosis: Humeral fracture  Assessment and Plan of Treatment: You suffered a fall last month. He have a fracture in your right arm in the upper bone called the humerus. This fracture required orthopedic surgery to intervene and fix the fracture with metal screws and possibly a mandy or other interventions they would require to fix its position for it to be properly aligned. This surgery was postponed because of the clots found in your lung and in your lower legs. These clots are dangerous to your well being especially if you would need to go under anesthesia for surgery. Once you are in a better health, please follow up with the recommendations from orthopedic surgery to fix your right arm once your circumstances improve.    Diagnosis: History of subdural hematoma  Assessment and Plan of Treatment: Once you were in the MICU at [facility], you were found to have collections of blood called hematomas inside of your head. There was concern for continued bleeds and whether or not these hematomas were dangerous to your health, but on repeat imaging, the hematomas were found to be stable. They are likely still in your head and the images you get of your head after will likely show the old bleeds, but you should still follow up with your neurologist. If you have an increased headache, changes in your vision, or any other changes that make you concerned, please seek medical attention immediately.     PRINCIPAL DISCHARGE DIAGNOSIS  Diagnosis: Pulmonary embolism  Assessment and Plan of Treatment: You came to the hospital for a large clot in your lung. You were also found to have other clots around your body. These are dangerous because they can prevent blood from flowing to the places where they normally go, but also because the clots can move around and lead to possible strokes in your brain and impede blood flow to arms or legs. You were transferred to [facility] because we evaluated whether or not you needed to have the clot in your lung mechanically removed. We concluded that you did not need it removed in that manner, rather, we started you on a blood thinner with medication in order to dissolve the clots. Continue Eliquis 5 mg twice daily to take through your PEG tube. It's important to stay on this medication to continue dissolving the clots. If you have an increasingly terrible headache, drastic changes in your vision, excruciating pain in any of your limbs, or excruciating abdominal pain, please seek a medical professional's help immediately.         SECONDARY DISCHARGE DIAGNOSES  Diagnosis: S/P percutaneous endoscopic gastrostomy (PEG) tube placement  Assessment and Plan of Treatment: We placed a PEG tube for you during this admission. The problem is that you were unable to tolerate foods by mouth. When patient's like yourself are at risk of inhaling food particles by accident, they can develop serious infections in their lungs. The PEG tube is a tube where artificial nutrition can be administered with a lower risk of inhaling food particles and so that long term you can still allow your stomach to process food. Many patients do well on PEG tube feeds, but please follow up with your primary care doctor for routine follow up.    Diagnosis: Humeral fracture  Assessment and Plan of Treatment: You suffered a fall last month. He have a fracture in your right arm in the upper bone called the humerus. This fracture required orthopedic surgery to intervene and fix the fracture with metal screws and possibly a mandy or other interventions they would require to fix its position for it to be properly aligned. This surgery was postponed because of the clots found in your lung and in your lower legs. These clots are dangerous to your well being especially if you would need to go under anesthesia for surgery. Once you are in a better health, please follow up with the recommendations from orthopedic surgery to fix your right arm once your circumstances improve.    Diagnosis: History of subdural hematoma  Assessment and Plan of Treatment: Once you were in the MICU at [facility], you were found to have collections of blood called hematomas inside of your head. There was concern for continued bleeds and whether or not these hematomas were dangerous to your health, but on repeat imaging, the hematomas were found to be stable. They are likely still in your head and the images you get of your head after will likely show the old bleeds, but you should still follow up with your neurologist. If you have an increased headache, changes in your vision, or any other changes that make you concerned, please seek medical attention immediately.

## 2024-10-11 NOTE — PROGRESS NOTE ADULT - PROBLEM SELECTOR PLAN 4
- Initially presented to [facility] on 9/14 for a mechanical fall after he tripped and fell onto his R shoulder  - XR showing R proximal humerus fracture, ORIF with Orthopedic Surgery at [facility] cancelled due to MICU course  > Orthopedic surgery on 10/7 said do outpt surgery.

## 2024-10-11 NOTE — DIETITIAN NUTRITION RISK NOTIFICATION - TREATMENT: THE FOLLOWING DIET HAS BEEN RECOMMENDED
Diet, NPO with Tube Feed:   Tube Feeding Modality: Nasogastric  Glucerna 1.2 Ronaldo (GLUCERNARTH)  Total Volume for 24 Hours (mL): 1440  Continuous  Starting Tube Feed Rate {mL per Hour}: 10  Increase Tube Feed Rate by (mL): 10     Every 12 hours  Until Goal Tube Feed Rate (mL per Hour): 60  Tube Feed Duration (in Hours): 24  Tube Feed Start Time: 13:00 (10-08-24 @ 14:51) [Active]

## 2024-10-11 NOTE — DISCHARGE NOTE PROVIDER - DETAILS OF MALNUTRITION DIAGNOSIS/DIAGNOSES
This patient has been assessed with a concern for Malnutrition and was treated during this hospitalization for the following Nutrition diagnosis/diagnoses:     -  10/11/2024: Moderate protein-calorie malnutrition

## 2024-10-11 NOTE — PROGRESS NOTE ADULT - PROBLEM SELECTOR PLAN 7
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  - per neuro, ordered myasthenia gravis labs, outpatient f/u, can consider ENT/GI consult  - outpatient f/u  - s/p FEES 10/10, recommended to remain NPO  - GI consulted for PEG placement

## 2024-10-11 NOTE — OCCUPATIONAL THERAPY INITIAL EVALUATION ADULT - SHORT TERM MEMORY, REHAB EVAL
Pt scored 18/28 error points on SBT indicated cognitive impairment, t/o assessment Pt agitated 'do not ask me more questions'/impaired

## 2024-10-11 NOTE — DISCHARGE NOTE PROVIDER - HOSPITAL COURSE
HPI:  Mr. Jarrett Overton is a 69-year-old w/ PMH of HTN, T2DM, CVA, A. flutter, CAD s/p CABG x3 (~2018), and alcohol use disorder who initially presented to [facility] on 9/14 for a mechanical fall after he tripped and fell onto his R shoulder, denied any headstrike or LOC, subsequent XR showing R proximal humerus fracture with planned ORIF with Orthopedic Surgery. Course was c/b alcohol withdrawal and was started on CIWA protocol. On 9/18, RRT was called for AMS and patient was found to be in acute hypercapnic respiratory failure iso prior Librium dose. Patient was given flumazenil and then intubated and transferred to the MICU. ORIF of R humerus was cancelled and CT head showed signs of subacute and chronic subdural hematomas. Patient was extubated on 9/19 and repeat CT head showed stable hematomas. Following extubation, patient suffered from significant dysphagia but patient declined PEG tube offered by GI and instead NG tube was placed. On 10/4, patient was found to be hypoxic with subsequent CTA chest showing large PE of R main pulmonary artery. US of b/l LEs was further significant for b/l DVTs in the R peroneal vein and L popliteal, tibial trunk, peroneal, and posterior tibial veins. Patient was transferred to [facility] for further management of PE and embolectomy evaluation. (05 Oct 2024 01:45)    Hospital Course: Pt treated with heparin then transitioned to eliquis then transitioned back to heparin. He was treated with zosyn for concern for aspiration PNA. GI was consulted for PEG tube placement. On day of discharge, patient is clinically stable with no new exam findings or acute symptoms compared to prior. The patient was seen by the attending physician on the date of discharge and deemed stable and acceptable for discharge. The patient's chronic medical conditions were treated accordingly per the patient's home medication regimen. The patient's medication reconciliation (with changes made to chronic medications), follow up appointments, discharge orders, instructions, and significant lab and diagnostic studies are as noted.    Important Medication Changes and Reason:    Active or Pending Issues Requiring Follow-up:    Discharge Diagnoses:  Pulmonary Embolism  Heart Failure       HPI:  Mr. Jarrett Overton is a 69-year-old w/ PMH of HTN, T2DM, CVA, A. flutter, CAD s/p CABG x3 (~2018), and alcohol use disorder who initially presented to [facility] on 9/14 for a mechanical fall after he tripped and fell onto his R shoulder, denied any headstrike or LOC, subsequent XR showing R proximal humerus fracture with planned ORIF with Orthopedic Surgery. Course was c/b alcohol withdrawal and was started on CIWA protocol. On 9/18, RRT was called for AMS and patient was found to be in acute hypercapnic respiratory failure iso prior Librium dose. Patient was given flumazenil and then intubated and transferred to the MICU. ORIF of R humerus was cancelled and CT head showed signs of subacute and chronic subdural hematomas. Patient was extubated on 9/19 and repeat CT head showed stable hematomas. Following extubation, patient suffered from significant dysphagia but patient declined PEG tube offered by GI and instead NG tube was placed. On 10/4, patient was found to be hypoxic with subsequent CTA chest showing large PE of R main pulmonary artery. US of b/l LEs was further significant for b/l DVTs in the R peroneal vein and L popliteal, tibial trunk, peroneal, and posterior tibial veins. Patient was transferred to [facility] for further management of PE and embolectomy evaluation. (05 Oct 2024 01:45)    Hospital Course: Pt treated with heparin then transitioned to eliquis then transitioned back to heparin. He was treated with zosyn for concern for aspiration PNA. GI was consulted for PEG tube placement. On day of discharge, patient is clinically stable with no new exam findings or acute symptoms compared to prior. The patient was seen by the attending physician on the date of discharge and deemed stable and acceptable for discharge. The patient's chronic medical conditions were treated accordingly per the patient's home medication regimen. The patient's medication reconciliation (with changes made to chronic medications), follow up appointments, discharge orders, instructions, and significant lab and diagnostic studies are as noted. PEG tube to be placed on 10/15 after reviewing CT abdomen on 10/14.     Important Medication Changes and Reason:    Active or Pending Issues Requiring Follow-up:    Discharge Diagnoses:  Pulmonary Embolism  Heart Failure       HPI:  Mr. Jarrett Overton is a 69-year-old w/ PMH of HTN, T2DM, CVA, A. flutter, CAD s/p CABG x3 (~2018), and alcohol use disorder who initially presented to [facility] on 9/14 for a mechanical fall after he tripped and fell onto his R shoulder, denied any headstrike or LOC, subsequent XR showing R proximal humerus fracture with planned ORIF with Orthopedic Surgery. Course was c/b alcohol withdrawal and was started on CIWA protocol. On 9/18, RRT was called for AMS and patient was found to be in acute hypercapnic respiratory failure iso prior Librium dose. Patient was given flumazenil and then intubated and transferred to the MICU. ORIF of R humerus was cancelled and CT head showed signs of subacute and chronic subdural hematomas. Patient was extubated on 9/19 and repeat CT head showed stable hematomas. Following extubation, patient suffered from significant dysphagia but patient declined PEG tube offered by GI and instead NG tube was placed. On 10/4, patient was found to be hypoxic with subsequent CTA chest showing large PE of R main pulmonary artery. US of b/l LEs was further significant for b/l DVTs in the R peroneal vein and L popliteal, tibial trunk, peroneal, and posterior tibial veins. Patient was transferred to [facility] for further management of PE and embolectomy evaluation. (05 Oct 2024 01:45)    Hospital Course: Pt treated with heparin then transitioned to eliquis then transitioned back to heparin. He was treated with zosyn for concern for aspiration PNA. Patient was concerning for the increase in his dysphagia and he failed multiple speech and swallow evaluations. GI was consulted for PEG tube placement. PEG tube to be placed on 10/15 after reviewing CT abdomen on 10/14. Patient was able to tolerate a rate of 65 ml/h for the feeds through the peg tube. After placement, patient was ultimately returned to Eliquis 5mg BID for anticoagulation of his thrombi. On day of discharge, patient is clinically stable with no new exam findings or acute symptoms compared to prior. The patient was seen by the attending physician on the date of discharge and deemed stable and acceptable for discharge. The patient's chronic medical conditions were treated accordingly per the patient's home medication regimen. The patient's medication reconciliation (with changes made to chronic medications), follow up appointments, discharge orders, instructions, and significant lab and diagnostic studies are as noted.    Important Medication Changes and Reason:  Eliquis 5 mg twice daily for the DVTs and PE     Active or Pending Issues Requiring Follow-up:  PE and DVT follow up  Follow up with GI after PEG tube placement  Follow up with cardiology     Discharge Diagnoses:  Pulmonary Embolism  DVTs  Heart Failure  PEG tube placement        HPI:  Mr. Jarrett Overton is a 69-year-old w/ PMH of HTN, T2DM, CVA, A. flutter, CAD s/p CABG x3 (~2018), and alcohol use disorder who initially presented to [facility] on 9/14 for a mechanical fall after he tripped and fell onto his R shoulder, denied any headstrike or LOC, subsequent XR showing R proximal humerus fracture with planned ORIF with Orthopedic Surgery. Course was c/b alcohol withdrawal and was started on CIWA protocol. On 9/18, RRT was called for AMS and patient was found to be in acute hypercapnic respiratory failure iso prior Librium dose. Patient was given flumazenil and then intubated and transferred to the MICU. ORIF of R humerus was cancelled and CT head showed signs of subacute and chronic subdural hematomas. Patient was extubated on 9/19 and repeat CT head showed stable hematomas. Following extubation, patient suffered from significant dysphagia but patient declined PEG tube offered by GI and instead NG tube was placed. On 10/4, patient was found to be hypoxic with subsequent CTA chest showing large PE of R main pulmonary artery. US of b/l LEs was further significant for b/l DVTs in the R peroneal vein and L popliteal, tibial trunk, peroneal, and posterior tibial veins. Patient was transferred to [facility] for further management of PE and embolectomy evaluation. (05 Oct 2024 01:45)    Hospital Course: Pt treated with heparin then transitioned to eliquis then transitioned back to heparin. He was treated with zosyn for concern for aspiration PNA. Patient was concerning for the increase in his dysphagia and he failed multiple speech and swallow evaluations. GI was consulted for PEG tube placement. PEG tube to be placed on 10/15 after reviewing CT abdomen on 10/14. Patient was able to tolerate a rate of 65 ml/h for the feeds through the peg tube. After placement, patient was ultimately returned to Eliquis 5mg BID for anticoagulation of his thrombi. On day of discharge, patient is clinically stable with no new exam findings or acute symptoms compared to prior. The patient was seen by the attending physician on the date of discharge and deemed stable and acceptable for discharge. The patient's chronic medical conditions were treated accordingly per the patient's home medication regimen. The patient's medication reconciliation (with changes made to chronic medications), follow up appointments, discharge orders, instructions, and significant lab and diagnostic studies are as noted.    Wound care recs for right elbow:  Recommend:  1.) topical therapy: Right elbow wound - cleanse with VASHE cleanser, pat dry, apply medihoney paste, cover with allevyn foam dressing daily  2.) Incontinence Management - incontinence cleanser, pads, pericare BID  3.) Maintain on an alternating air with low air loss surface  4.) Turn and reposition Q 2 hours  5.) Nutrition optimization - please add Talat  6.) Offload heels/feet with complete cair air fluidized boots/pillows; ensure that the soles of the feet are not resting on the foot board of the bed.  7.) chair cushion for chair sitting  8.) glycemic control  9.) Keep skin under/in contact with medical devices clean and dry  10.) Monitor skin under/in contact with medical devices for skin breakdown  11.) Discontinue medical devices as soon as no longer indicated  12.) Utilize medical devices per  guidelines  13.) Protect skin under medical devices with foam dressings for first sign of irritation  14.) Adjust medical devices as needed to ensure proper fit        Important Medication Changes and Reason:  Eliquis 5 mg twice daily for the DVTs and PE     Active or Pending Issues Requiring Follow-up:  PE and DVT follow up  Follow up with GI after PEG tube placement  Follow up with cardiology     Discharge Diagnoses:  Pulmonary Embolism  DVTs  Heart Failure  PEG tube placement        HPI:  Mr. Jarrett Overton is a 69-year-old w/ PMH of HTN, T2DM, CVA, A. flutter, CAD s/p CABG x3 (~2018), and alcohol use disorder who initially presented to [facility] on 9/14 for a mechanical fall after he tripped and fell onto his R shoulder, denied any headstrike or LOC, subsequent XR showing R proximal humerus fracture with planned ORIF with Orthopedic Surgery. Course was c/b alcohol withdrawal and was started on CIWA protocol. On 9/18, RRT was called for AMS and patient was found to be in acute hypercapnic respiratory failure iso prior Librium dose. Patient was given flumazenil and then intubated and transferred to the MICU. ORIF of R humerus was cancelled and CT head showed signs of subacute and chronic subdural hematomas. Patient was extubated on 9/19 and repeat CT head showed stable hematomas. Following extubation, patient suffered from significant dysphagia but patient declined PEG tube offered by GI and instead NG tube was placed. On 10/4, patient was found to be hypoxic with subsequent CTA chest showing large PE of R main pulmonary artery. US of b/l LEs was further significant for b/l DVTs in the R peroneal vein and L popliteal, tibial trunk, peroneal, and posterior tibial veins. Patient was transferred to [facility] for further management of PE and embolectomy evaluation. (05 Oct 2024 01:45)    Hospital Course: Pt was started on heparin drip. He was treated with Zosyn for concern for aspiration PNA. Patient failed multiple speech and swallow evaluations. GI was consulted for PEG tube placement. PEG tube to be placed on 10/15 after reviewing CT abdomen on 10/14. Patient was able to tolerate a rate of 65 ml/h for the feeds through the PEG tube. After placement, patient was ultimately returned to Eliquis 5mg BID for anticoagulation of his thrombi. On day of discharge, patient is clinically stable with no new exam findings or acute symptoms compared to prior. The patient was seen by the attending physician on the date of discharge and deemed stable and acceptable for discharge. The patient's chronic medical conditions were treated accordingly per the patient's home medication regimen. The patient's medication reconciliation (with changes made to chronic medications), follow up appointments, discharge orders, instructions, and significant lab and diagnostic studies are as noted.    Wound care recs for right elbow:  Recommend:  1.) Topical therapy: Right elbow wound - cleanse with VASHE cleanser, pat dry, apply Medihoney paste, cover with Allevyn foam dressing daily  2.) Incontinence Management - incontinence cleanser, pads, pericare BID  3.) Maintain on an alternating air with low air loss surface  4.) Turn and reposition Q 2 hours  5.) Nutrition optimization - please add Talat  6.) Offload heels/feet with complete Cair air fluidized boots/pillows; ensure that the soles of the feet are not resting on the foot board of the bed.  7.) chair cushion for chair sitting  8.) glycemic control  9.) Keep skin under/in contact with medical devices clean and dry  10.) Monitor skin under/in contact with medical devices for skin breakdown  11.) Discontinue medical devices as soon as no longer indicated  12.) Utilize medical devices per  guidelines  13.) Protect skin under medical devices with foam dressings for first sign of irritation  14.) Adjust medical devices as needed to ensure proper fit        Important Medication Changes and Reason:  Eliquis 5 mg twice daily for the DVTs and PE     Active or Pending Issues Requiring Follow-up:  Follow up with GI after PEG tube placement  Follow up with cardiology     Discharge Diagnoses:  Intermediate-High Risk PE  DVTs  Heart Failure  Failure to Thrive  Functional Quadriplegia - PPSV2 <30%.  Dysphagia 2/2 PEGT  Paroxysmal Atrial Flutter        HPI:  Mr. Jarrett Overton is a 69-year-old w/ PMH of HTN, T2DM, CVA, A. flutter, CAD s/p CABG x3 (~2018), and alcohol use disorder who initially presented to [facility] on 9/14 for a mechanical fall after he tripped and fell onto his R shoulder, denied any headstrike or LOC, subsequent XR showing R proximal humerus fracture with planned ORIF with Orthopedic Surgery. Course was c/b alcohol withdrawal and was started on CIWA protocol. On 9/18, RRT was called for AMS and patient was found to be in acute hypercapnic respiratory failure iso prior Librium dose. Patient was given flumazenil and then intubated and transferred to the MICU. ORIF of R humerus was cancelled and CT head showed signs of subacute and chronic subdural hematomas. Patient was extubated on 9/19 and repeat CT head showed stable hematomas. Following extubation, patient suffered from significant dysphagia but patient declined PEG tube offered by GI and instead NG tube was placed. On 10/4, patient was found to be hypoxic with subsequent CTA chest showing large PE of R main pulmonary artery. US of b/l LEs was further significant for b/l DVTs in the R peroneal vein and L popliteal, tibial trunk, peroneal, and posterior tibial veins. Patient was transferred to [facility] for further management of PE and embolectomy evaluation. (05 Oct 2024 01:45)    Hospital Course: Pt was started on heparin drip. He was treated with Zosyn for concern for aspiration PNA. Patient failed multiple speech and swallow evaluations. GI was consulted for PEG tube placement. PEG tube to be placed on 10/15 after reviewing CT abdomen on 10/14. Patient was able to tolerate a rate of 65 ml/h for the feeds through the PEG tube. After placement, patient was ultimately returned to Eliquis 5mg BID for anticoagulation of his thrombi. On day of discharge, patient is clinically stable with no new exam findings or acute symptoms compared to prior. The patient was seen by the attending physician on the date of discharge and deemed stable and acceptable for discharge. The patient's chronic medical conditions were treated accordingly per the patient's home medication regimen. The patient's medication reconciliation (with changes made to chronic medications), follow up appointments, discharge orders, instructions, and significant lab and diagnostic studies are as noted.    Wound care recs for right elbow:  Recommend:  1.) Topical therapy: Right elbow wound - cleanse with VASHE cleanser, pat dry, apply Medihoney paste, cover with Allevyn foam dressing daily  2.) Incontinence Management - incontinence cleanser, pads, pericare BID  3.) Maintain on an alternating air with low air loss surface  4.) Turn and reposition Q 2 hours  5.) Nutrition optimization - please add Talat  6.) Offload heels/feet with complete Cair air fluidized boots/pillows; ensure that the soles of the feet are not resting on the foot board of the bed.  7.) chair cushion for chair sitting  8.) glycemic control  9.) Keep skin under/in contact with medical devices clean and dry  10.) Monitor skin under/in contact with medical devices for skin breakdown  11.) Discontinue medical devices as soon as no longer indicated  12.) Utilize medical devices per  guidelines  13.) Protect skin under medical devices with foam dressings for first sign of irritation  14.) Adjust medical devices as needed to ensure proper fit        Important Medication Changes and Reason:  Eliquis 5 mg twice daily for the DVTs and PE     Active or Pending Issues Requiring Follow-up:  Follow up with GI after PEG tube placement  Follow up with cardiology     Discharge Diagnoses:  Intermediate-High Risk PE  DVTs  Acute hypoxic respiratory failure   Failure to Thrive  Functional Quadriplegia - PPSV2 <30%.  Dysphagia 2/2 PEGT  Chronic Atrial Flutter        HPI:  Mr. Jarrett Overton is a 69-year-old w/ PMH of HTN, T2DM, CVA, A. flutter, CAD s/p CABG x3 (~2018), and alcohol use disorder who initially presented to [facility] on 9/14 for a mechanical fall after he tripped and fell onto his R shoulder, denied any headstrike or LOC, subsequent XR showing R proximal humerus fracture with planned ORIF with Orthopedic Surgery. Course was c/b alcohol withdrawal and was started on CIWA protocol. On 9/18, RRT was called for AMS and patient was found to be in acute hypercapnic respiratory failure iso prior Librium dose. Patient was given flumazenil and then intubated and transferred to the MICU. ORIF of R humerus was cancelled and CT head showed signs of subacute and chronic subdural hematomas. Patient was extubated on 9/19 and repeat CT head showed stable hematomas. Following extubation, patient suffered from significant dysphagia but patient declined PEG tube offered by GI and instead NG tube was placed. On 10/4, patient was found to be hypoxic with subsequent CTA chest showing large PE of R main pulmonary artery. US of b/l LEs was further significant for b/l DVTs in the R peroneal vein and L popliteal, tibial trunk, peroneal, and posterior tibial veins. Patient was transferred to [facility] for further management of PE and embolectomy evaluation. (05 Oct 2024 01:45)    Hospital Course: Pt was started on heparin drip. He was treated with Zosyn for concern for aspiration PNA. Patient failed multiple speech and swallow evaluations. GI was consulted for PEG tube placement. PEG tube to be placed on 10/15 after reviewing CT abdomen on 10/14. Patient was able to tolerate a rate of 65 ml/h for the feeds through the PEG tube. After placement, patient was ultimately returned to Eliquis 5mg BID for anticoagulation of his thrombi. On day of discharge, patient is clinically stable with no new exam findings or acute symptoms compared to prior. The patient was seen by the attending physician on the date of discharge and deemed stable and acceptable for discharge. The patient's chronic medical conditions were treated accordingly per the patient's home medication regimen. The patient's medication reconciliation (with changes made to chronic medications), follow up appointments, discharge orders, instructions, and significant lab and diagnostic studies are as noted.    Wound care recs for right elbow:  Recommend:  1.) Topical therapy: Right elbow wound - cleanse with VASHE cleanser, pat dry, apply Medihoney paste, cover with Allevyn foam dressing daily  2.) Incontinence Management - incontinence cleanser, pads, pericare BID  3.) Maintain on an alternating air with low air loss surface  4.) Turn and reposition Q 2 hours  5.) Nutrition optimization - please add Talat  6.) Offload heels/feet with complete Cair air fluidized boots/pillows; ensure that the soles of the feet are not resting on the foot board of the bed.  7.) chair cushion for chair sitting  8.) glycemic control  9.) Keep skin under/in contact with medical devices clean and dry  10.) Monitor skin under/in contact with medical devices for skin breakdown  11.) Discontinue medical devices as soon as no longer indicated  12.) Utilize medical devices per  guidelines  13.) Protect skin under medical devices with foam dressings for first sign of irritation  14.) Adjust medical devices as needed to ensure proper fit        Important Medication Changes and Reason:  Eliquis 5 mg twice daily for the DVTs and PE     Active or Pending Issues Requiring Follow-up:  Follow up with GI after PEG tube placement  Follow up with cardiology     Discharge Diagnoses:  Intermediate-High Risk PE  DVTs  Acute hypoxic respiratory failure   Failure to Thrive  Functional Quadriplegia - PPSV2 <30%.  Dysphagia 2/2 PEGT  Chronic Atrial Flutter   Subacute/chronic subdural hematoma

## 2024-10-12 LAB
ALBUMIN SERPL ELPH-MCNC: 3.7 G/DL — SIGNIFICANT CHANGE UP (ref 3.3–5)
ALP SERPL-CCNC: 102 U/L — SIGNIFICANT CHANGE UP (ref 40–120)
ALT FLD-CCNC: 13 U/L — SIGNIFICANT CHANGE UP (ref 10–45)
ANION GAP SERPL CALC-SCNC: 11 MMOL/L — SIGNIFICANT CHANGE UP (ref 5–17)
APTT BLD: 102.5 SEC — HIGH (ref 24.5–35.6)
APTT BLD: 91.1 SEC — HIGH (ref 24.5–35.6)
AST SERPL-CCNC: 18 U/L — SIGNIFICANT CHANGE UP (ref 10–40)
BASOPHILS # BLD AUTO: 0.05 K/UL — SIGNIFICANT CHANGE UP (ref 0–0.2)
BASOPHILS NFR BLD AUTO: 0.7 % — SIGNIFICANT CHANGE UP (ref 0–2)
BILIRUB SERPL-MCNC: 0.3 MG/DL — SIGNIFICANT CHANGE UP (ref 0.2–1.2)
BUN SERPL-MCNC: 11 MG/DL — SIGNIFICANT CHANGE UP (ref 7–23)
CALCIUM SERPL-MCNC: 10 MG/DL — SIGNIFICANT CHANGE UP (ref 8.4–10.5)
CHLORIDE SERPL-SCNC: 97 MMOL/L — SIGNIFICANT CHANGE UP (ref 96–108)
CO2 SERPL-SCNC: 30 MMOL/L — SIGNIFICANT CHANGE UP (ref 22–31)
CREAT SERPL-MCNC: 0.84 MG/DL — SIGNIFICANT CHANGE UP (ref 0.5–1.3)
EGFR: 96 ML/MIN/1.73M2 — SIGNIFICANT CHANGE UP
EOSINOPHIL # BLD AUTO: 0.27 K/UL — SIGNIFICANT CHANGE UP (ref 0–0.5)
EOSINOPHIL NFR BLD AUTO: 4 % — SIGNIFICANT CHANGE UP (ref 0–6)
GLUCOSE BLDC GLUCOMTR-MCNC: 137 MG/DL — HIGH (ref 70–99)
GLUCOSE BLDC GLUCOMTR-MCNC: 141 MG/DL — HIGH (ref 70–99)
GLUCOSE BLDC GLUCOMTR-MCNC: 149 MG/DL — HIGH (ref 70–99)
GLUCOSE BLDC GLUCOMTR-MCNC: 151 MG/DL — HIGH (ref 70–99)
GLUCOSE BLDC GLUCOMTR-MCNC: 164 MG/DL — HIGH (ref 70–99)
GLUCOSE SERPL-MCNC: 142 MG/DL — HIGH (ref 70–99)
HCT VFR BLD CALC: 33.3 % — LOW (ref 39–50)
HGB BLD-MCNC: 10.5 G/DL — LOW (ref 13–17)
IMM GRANULOCYTES NFR BLD AUTO: 0.6 % — SIGNIFICANT CHANGE UP (ref 0–0.9)
LYMPHOCYTES # BLD AUTO: 1.81 K/UL — SIGNIFICANT CHANGE UP (ref 1–3.3)
LYMPHOCYTES # BLD AUTO: 26.7 % — SIGNIFICANT CHANGE UP (ref 13–44)
MAGNESIUM SERPL-MCNC: 2 MG/DL — SIGNIFICANT CHANGE UP (ref 1.6–2.6)
MCHC RBC-ENTMCNC: 29.4 PG — SIGNIFICANT CHANGE UP (ref 27–34)
MCHC RBC-ENTMCNC: 31.5 GM/DL — LOW (ref 32–36)
MCV RBC AUTO: 93.3 FL — SIGNIFICANT CHANGE UP (ref 80–100)
MONOCYTES # BLD AUTO: 0.74 K/UL — SIGNIFICANT CHANGE UP (ref 0–0.9)
MONOCYTES NFR BLD AUTO: 10.9 % — SIGNIFICANT CHANGE UP (ref 2–14)
NEUTROPHILS # BLD AUTO: 3.87 K/UL — SIGNIFICANT CHANGE UP (ref 1.8–7.4)
NEUTROPHILS NFR BLD AUTO: 57.1 % — SIGNIFICANT CHANGE UP (ref 43–77)
NRBC # BLD: 0 /100 WBCS — SIGNIFICANT CHANGE UP (ref 0–0)
PHOSPHATE SERPL-MCNC: 4.2 MG/DL — SIGNIFICANT CHANGE UP (ref 2.5–4.5)
PLATELET # BLD AUTO: 242 K/UL — SIGNIFICANT CHANGE UP (ref 150–400)
POTASSIUM SERPL-MCNC: 4.1 MMOL/L — SIGNIFICANT CHANGE UP (ref 3.5–5.3)
POTASSIUM SERPL-SCNC: 4.1 MMOL/L — SIGNIFICANT CHANGE UP (ref 3.5–5.3)
PROT SERPL-MCNC: 7.3 G/DL — SIGNIFICANT CHANGE UP (ref 6–8.3)
RBC # BLD: 3.57 M/UL — LOW (ref 4.2–5.8)
RBC # FLD: 13.3 % — SIGNIFICANT CHANGE UP (ref 10.3–14.5)
SODIUM SERPL-SCNC: 138 MMOL/L — SIGNIFICANT CHANGE UP (ref 135–145)
WBC # BLD: 6.78 K/UL — SIGNIFICANT CHANGE UP (ref 3.8–10.5)
WBC # FLD AUTO: 6.78 K/UL — SIGNIFICANT CHANGE UP (ref 3.8–10.5)

## 2024-10-12 RX ADMIN — FLUTICASONE PROPIONATE AND SALMETEROL XINAFOATE 1 DOSE(S): 230; 21 AEROSOL, METERED RESPIRATORY (INHALATION) at 17:36

## 2024-10-12 RX ADMIN — Medication 1: at 18:08

## 2024-10-12 RX ADMIN — POLYETHYLENE GLYCOL 3350 17 GRAM(S): 17 POWDER, FOR SOLUTION ORAL at 11:38

## 2024-10-12 RX ADMIN — HEPARIN SODIUM 10 UNIT(S)/HR: 10000 INJECTION INTRAVENOUS; SUBCUTANEOUS at 18:57

## 2024-10-12 RX ADMIN — Medication 200 MILLIGRAM(S): at 06:48

## 2024-10-12 RX ADMIN — Medication 10 MILLIGRAM(S): at 06:48

## 2024-10-12 RX ADMIN — HEPARIN SODIUM 10 UNIT(S)/HR: 10000 INJECTION INTRAVENOUS; SUBCUTANEOUS at 22:16

## 2024-10-12 RX ADMIN — Medication 100 MILLIGRAM(S): at 11:38

## 2024-10-12 RX ADMIN — IPRATROPIUM BROMIDE AND ALBUTEROL SULFATE 3 MILLILITER(S): .5; 2.5 SOLUTION RESPIRATORY (INHALATION) at 17:38

## 2024-10-12 RX ADMIN — HEPARIN SODIUM 13 UNIT(S)/HR: 10000 INJECTION INTRAVENOUS; SUBCUTANEOUS at 11:37

## 2024-10-12 RX ADMIN — PANTOPRAZOLE SODIUM 40 MILLIGRAM(S): 40 TABLET, DELAYED RELEASE ORAL at 11:38

## 2024-10-12 RX ADMIN — Medication 25 MILLIGRAM(S): at 17:36

## 2024-10-12 RX ADMIN — FLUTICASONE PROPIONATE AND SALMETEROL XINAFOATE 1 DOSE(S): 230; 21 AEROSOL, METERED RESPIRATORY (INHALATION) at 06:48

## 2024-10-12 RX ADMIN — Medication 40 MILLIGRAM(S): at 22:17

## 2024-10-12 RX ADMIN — Medication 0: at 22:16

## 2024-10-12 RX ADMIN — Medication 25 MILLIGRAM(S): at 06:47

## 2024-10-12 NOTE — PROGRESS NOTE ADULT - PROBLEM SELECTOR PLAN 2
- Hospital course at [facility] c/b AHRF  - S/p intubation on 9/18 for likely benzodiazepine overdose, extubated on 9/19  - Diffuse rhonchi with copious secretions on exam  - CTA chest (10/4) -> Large PE of R main pulmonary artery, scattered GGOs and interstitial changes predominantly in R and L lower lobes  - Likely PE +/- superimposed aspiration PNA  > C/w management of PE, as above  > C/w empiric Zosyn for 7d (10/5-10/12)  > C/w oxygen supplementation prn  - attempt to d/c nasal cannula today - Hospital course at [facility] c/b AHRF  - S/p intubation on 9/18 for likely benzodiazepine overdose, extubated on 9/19  - Diffuse rhonchi with copious secretions on exam  - CTA chest (10/4) -> Large PE of R main pulmonary artery, scattered GGOs and interstitial changes predominantly in R and L lower lobes  - Likely PE +/- superimposed aspiration PNA  > C/w management of PE, as above  >Empiric Zosyn for 7d (10/5-10/12)  > C/w oxygen supplementation prn  - planto d/c nasal cannula today

## 2024-10-12 NOTE — PROGRESS NOTE ADULT - PROBLEM SELECTOR PLAN 7
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  - per neuro, ordered myasthenia gravis labs, outpatient f/u, can consider ENT/GI consult  - outpatient f/u  - s/p FEES 10/10, recommended to remain NPO  - GI consulted for PEG placement - Significant dysphagia s/p extubation on 9/19  - Patient declined PEG tube, NG tube placed instead, pt pulled out, now replaced  > failed bedside S&S eval w/ laryngeal aspiration, rec'd NPO and FEES  > 10/8 MRI brain: Small acute/subacute infarcts within the right cerebellar   hemisphere and left posterior centrum semiovale with associated cytotoxic   edema.  - per neuro, ordered myasthenia gravis labs, outpatient f/u, can consider ENT/GI consult  - outpatient f/u  - s/p FEES 10/10, recommended to remain NPO  - GI consulted for PEG placement, planned 10/15, obtain cardiac clearance 10/14

## 2024-10-12 NOTE — PROGRESS NOTE ADULT - PROBLEM SELECTOR PLAN 8
10/8 MRI brain: Small acute/subacute infarcts within the right cerebellar   hemisphere and left posterior centrum semiovale with associated cytotoxic   edema  -Neuro consulted  - statin 40  - target LDL <70, SBP <130  - start thiamine

## 2024-10-12 NOTE — PROGRESS NOTE ADULT - PROBLEM SELECTOR PLAN 4
- Initially presented to [facility] on 9/14 for a mechanical fall after he tripped and fell onto his R shoulder  - XR showing R proximal humerus fracture, ORIF with Orthopedic Surgery at [facility] cancelled due to MICU course  > Orthopedic surgery on 10/7 said do outpt surgery.

## 2024-10-12 NOTE — PROGRESS NOTE ADULT - PROBLEM SELECTOR PLAN 3
- US of b/l LEs at [facility] -> R peroneal vein and L popliteal, tibial trunk, peroneal, and posterior tibial DVTs  > Repeat US of b/l LEs showing mulitple DVTs  > C/w heparin as above

## 2024-10-12 NOTE — PROGRESS NOTE ADULT - SUBJECTIVE AND OBJECTIVE BOX
PROGRESS NOTE:   Authored by: Stephanie Pedroza MD  PGY-1    Patient is a 69y old  Male who presents with a chief complaint of Pulmonary Embolism (11 Oct 2024 18:43)      SUBJECTIVE / OVERNIGHT EVENTS:  No acute events overnight. Pt doing well this morning.    MEDICATIONS  (STANDING):  aMIOdarone    Tablet 200 milliGRAM(s) Oral daily  amLODIPine   Tablet 10 milliGRAM(s) Oral daily  atorvastatin 40 milliGRAM(s) Oral at bedtime  dextrose 5%. 1000 milliLiter(s) (100 mL/Hr) IV Continuous <Continuous>  dextrose 5%. 1000 milliLiter(s) (50 mL/Hr) IV Continuous <Continuous>  dextrose 50% Injectable 12.5 Gram(s) IV Push once  dextrose 50% Injectable 25 Gram(s) IV Push once  dextrose 50% Injectable 25 Gram(s) IV Push once  dextrose Oral Gel 15 Gram(s) Oral once  fluticasone propionate/ salmeterol 250-50 MICROgram(s) Diskus 1 Dose(s) Inhalation two times a day  glucagon  Injectable 1 milliGRAM(s) IntraMuscular once  heparin  Infusion 1400 Unit(s)/Hr (13 mL/Hr) IV Continuous <Continuous>  insulin lispro (ADMELOG) corrective regimen sliding scale   SubCutaneous at bedtime  insulin lispro (ADMELOG) corrective regimen sliding scale   SubCutaneous every 6 hours  metoprolol tartrate 25 milliGRAM(s) Oral two times a day  pantoprazole  Injectable 40 milliGRAM(s) IV Push daily  polyethylene glycol 3350 17 Gram(s) Oral daily  thiamine 100 milliGRAM(s) Oral daily    MEDICATIONS  (PRN):  albuterol/ipratropium for Nebulization 3 milliLiter(s) Nebulizer every 6 hours PRN Shortness of Breath and/or Wheezing      CAPILLARY BLOOD GLUCOSE      POCT Blood Glucose.: 137 mg/dL (12 Oct 2024 06:28)  POCT Blood Glucose.: 147 mg/dL (11 Oct 2024 23:57)  POCT Blood Glucose.: 149 mg/dL (11 Oct 2024 17:44)  POCT Blood Glucose.: 159 mg/dL (11 Oct 2024 11:43)    I&O's Summary    11 Oct 2024 07:01  -  12 Oct 2024 07:00  --------------------------------------------------------  IN: 368 mL / OUT: 800 mL / NET: -432 mL    12 Oct 2024 07:01  -  12 Oct 2024 09:53  --------------------------------------------------------  IN: 0 mL / OUT: 75 mL / NET: -75 mL        PHYSICAL EXAM:  Vital Signs Last 24 Hrs  T(C): 37 (12 Oct 2024 04:01), Max: 37 (12 Oct 2024 04:01)  T(F): 98.6 (12 Oct 2024 04:01), Max: 98.6 (12 Oct 2024 04:01)  HR: 76 (12 Oct 2024 04:01) (67 - 78)  BP: 112/74 (12 Oct 2024 04:01) (93/59 - 118/74)  BP(mean): --  RR: 18 (12 Oct 2024 04:01) (18 - 18)  SpO2: 98% (12 Oct 2024 04:01) (94% - 100%)    Parameters below as of 12 Oct 2024 04:01  Patient On (Oxygen Delivery Method): nasal cannula  O2 Flow (L/min): 2      CONSTITUTIONAL: NAD, well-developed  RESPIRATORY: Normal respiratory effort; lungs are clear to auscultation bilaterally  CARDIOVASCULAR: Regular rate and rhythm, normal S1 and S2, no murmur/rub/gallop; Peripheral pulses are 2+ bilaterally  ABDOMEN: Nontender to palpation, normoactive bowel sounds, no rebound/guarding  MUSCLOSKELETAL:  Moving all limbs spontaneously; No lower extremity edema  PSYCH: Affect appropriate    LABS:                        10.5   6.78  )-----------( 242      ( 12 Oct 2024 07:09 )             33.3     10-12    138  |  97  |  11  ----------------------------<  142[H]  4.1   |  30  |  0.84    Ca    10.0      12 Oct 2024 07:08  Phos  4.2     10-12  Mg     2.0     10-12    TPro  7.3  /  Alb  3.7  /  TBili  0.3  /  DBili  x   /  AST  18  /  ALT  13  /  AlkPhos  102  10-12    PTT - ( 12 Oct 2024 07:09 )  PTT:91.1 sec        MICRO:  Urinalysis Basic - ( 12 Oct 2024 07:08 )    Color: x / Appearance: x / SG: x / pH: x  Gluc: 142 mg/dL / Ketone: x  / Bili: x / Urobili: x   Blood: x / Protein: x / Nitrite: x   Leuk Esterase: x / RBC: x / WBC x   Sq Epi: x / Non Sq Epi: x / Bacteria: x          IMAGING: PROGRESS NOTE:   Authored by: Stephanie Pedroza MD  PGY-1    Patient is a 69y old  Male who presents with a chief complaint of Pulmonary Embolism (11 Oct 2024 18:43)      SUBJECTIVE / OVERNIGHT EVENTS:  No acute events overnight. Pt doing well this morning.    MEDICATIONS  (STANDING):  aMIOdarone    Tablet 200 milliGRAM(s) Oral daily  amLODIPine   Tablet 10 milliGRAM(s) Oral daily  atorvastatin 40 milliGRAM(s) Oral at bedtime  dextrose 5%. 1000 milliLiter(s) (100 mL/Hr) IV Continuous <Continuous>  dextrose 5%. 1000 milliLiter(s) (50 mL/Hr) IV Continuous <Continuous>  dextrose 50% Injectable 12.5 Gram(s) IV Push once  dextrose 50% Injectable 25 Gram(s) IV Push once  dextrose 50% Injectable 25 Gram(s) IV Push once  dextrose Oral Gel 15 Gram(s) Oral once  fluticasone propionate/ salmeterol 250-50 MICROgram(s) Diskus 1 Dose(s) Inhalation two times a day  glucagon  Injectable 1 milliGRAM(s) IntraMuscular once  heparin  Infusion 1400 Unit(s)/Hr (13 mL/Hr) IV Continuous <Continuous>  insulin lispro (ADMELOG) corrective regimen sliding scale   SubCutaneous at bedtime  insulin lispro (ADMELOG) corrective regimen sliding scale   SubCutaneous every 6 hours  metoprolol tartrate 25 milliGRAM(s) Oral two times a day  pantoprazole  Injectable 40 milliGRAM(s) IV Push daily  polyethylene glycol 3350 17 Gram(s) Oral daily  thiamine 100 milliGRAM(s) Oral daily    MEDICATIONS  (PRN):  albuterol/ipratropium for Nebulization 3 milliLiter(s) Nebulizer every 6 hours PRN Shortness of Breath and/or Wheezing      CAPILLARY BLOOD GLUCOSE      POCT Blood Glucose.: 137 mg/dL (12 Oct 2024 06:28)  POCT Blood Glucose.: 147 mg/dL (11 Oct 2024 23:57)  POCT Blood Glucose.: 149 mg/dL (11 Oct 2024 17:44)  POCT Blood Glucose.: 159 mg/dL (11 Oct 2024 11:43)    I&O's Summary    11 Oct 2024 07:01  -  12 Oct 2024 07:00  --------------------------------------------------------  IN: 368 mL / OUT: 800 mL / NET: -432 mL    12 Oct 2024 07:01  -  12 Oct 2024 09:53  --------------------------------------------------------  IN: 0 mL / OUT: 75 mL / NET: -75 mL        PHYSICAL EXAM:  Vital Signs Last 24 Hrs  T(C): 37 (12 Oct 2024 04:01), Max: 37 (12 Oct 2024 04:01)  T(F): 98.6 (12 Oct 2024 04:01), Max: 98.6 (12 Oct 2024 04:01)  HR: 76 (12 Oct 2024 04:01) (67 - 78)  BP: 112/74 (12 Oct 2024 04:01) (93/59 - 118/74)  BP(mean): --  RR: 18 (12 Oct 2024 04:01) (18 - 18)  SpO2: 98% (12 Oct 2024 04:01) (94% - 100%)    Parameters below as of 12 Oct 2024 04:01  Patient On (Oxygen Delivery Method): nasal cannula  O2 Flow (L/min): 2    CONSTITUTIONAL: NAD  RESPIRATORY: Normal respiratory effort; lungs are clear to auscultation bilaterally  CARDIOVASCULAR: Regular rate and rhythm, normal S1 and S2, no murmur/rub/gallop; Peripheral pulses are 2+ bilaterally  ABDOMEN: Nontender to palpation, normoactive bowel sounds, no rebound/guarding  MUSCLOSKELETAL:  Moving all limbs spontaneously; No lower extremity edema  PSYCH: Affect appropriate    LABS:                        10.5   6.78  )-----------( 242      ( 12 Oct 2024 07:09 )             33.3     10-12    138  |  97  |  11  ----------------------------<  142[H]  4.1   |  30  |  0.84    Ca    10.0      12 Oct 2024 07:08  Phos  4.2     10-12  Mg     2.0     10-12    TPro  7.3  /  Alb  3.7  /  TBili  0.3  /  DBili  x   /  AST  18  /  ALT  13  /  AlkPhos  102  10-12    PTT - ( 12 Oct 2024 07:09 )  PTT:91.1 sec        MICRO:  Urinalysis Basic - ( 12 Oct 2024 07:08 )    Color: x / Appearance: x / SG: x / pH: x  Gluc: 142 mg/dL / Ketone: x  / Bili: x / Urobili: x   Blood: x / Protein: x / Nitrite: x   Leuk Esterase: x / RBC: x / WBC x   Sq Epi: x / Non Sq Epi: x / Bacteria: x          IMAGING: No new

## 2024-10-12 NOTE — PROGRESS NOTE ADULT - ASSESSMENT
Mr. Jarrett Overton is a 69-year-old w/ PMH of HTN, T2DM, CVA, A. flutter, CAD s/p CABG x3 (~2018), and alcohol use disorder who initially presented to [facility] on 9/14 for a mechanical fall. On 10/4, patient was found to be hypoxic with subsequent CTA chest showing large PE of R main pulmonary artery. US of b/l LEs was further significant for b/l DVTs in the R peroneal vein and L popliteal, tibial trunk, peroneal, and posterior tibial veins. Patient was transferred to [facility] for further management of PE and embolectomy evaluation.

## 2024-10-12 NOTE — PROGRESS NOTE ADULT - ATTENDING COMMENTS
69 year old male, with PMH of HTN, DM2, ?CVA/TIA, Aflutter, CAD s/p CABG (2018), EtOH use disorder (two 40oz beers daily), current smoker (8 cigs/day), L total hip arthroplasty, initially admitted to [facility] (9/14/24-10/4/24) after mech fall c/b R prox humeral fx and EtOH withdrawal. Was on CIWA with Librium/Ativan PRN. TTE on 9/15/24 showed LVEF 60-65%, grad I DD, and an aortic valve prosthesis with normal function. Course c/b AMS, acute hypoxemic/hypercapnic respiratory, and agonal breathing on 9/18/24. Was initially given flumazenil x2 with some improvement, but was intubated for airway protection given excessive secretions and transferred to MICU 9/18/24. CTA chest was somewhat limited study but did not find PE at the time, noted RUL GGOs "suspicious for atypical or viral infection." CTH showed subacute and chronic SDHs as well as chronic lacunar infarct within L lentiform nucleus. Repeat CT showed stable SDHs. Extubated on 9/19/24. Pt was downgraded to Medicine floors on 9/21/24. Noted to have loss of voice and dysphagia. Evaluated by S+S, found dysphagia on video flouroscopy to all textures and recommended for PEG tube, but pt refused and opted for NGT placement. Ortho surgery that was planned for humeral fx was cancelled and ultimately recommended for conservative management only. CXR on 10/3/24 showed new LLL infiltrate, started on Zosyn. On 10/4/24, pt desatted on supplemental O2, CTA chest found massive PE in R main bronchus with pulm trunk dilatation. BLE duplex also found b/l LE DVTs (L>R). Started on hep gtt and transferred to [facility] for embolectomy evaluation.      #Acute hypoxemic respiratory failure  #R main bronchus PE, b/l LE DVTs  #?PNA  #CVA  #Dysphagia  #R humeral fx  #Chronic aflutter  #DM2  #EtOH/tobacco use disorder    - on 2L NC, wean off oxygen and monitor   - c/w empiric zosyn for now; likely continue for 7 day course   - c/w amio and metoprolol (was on at OSH)  - c/w advair BID and duonebs q6h PRN  - c/w low ISS for now, monitor FS  - monitor on telemetry  - MRI head showing acute/subacute infarcts; neuro eval pending   - statin, thiamine  - TTE no acute findings   - speech and swallow, NPO, FEES -> did not pass study   - transitioned to eliquis 10mg BID for 7 days (including heparin drip days); now back on heparin drip for planned PEG tube placement; when ready to switch back to eliquis, will be on 5mg BID  - ortho - outpatient f/u   - started on NG feeds 10/5 but patient pulled out NGT 10/6 overnight; ENT replaced NGT on 10/7. Resume NGT feeds; dietitian recs noted    - plan for PEG placement tuesday,   - cardio clearance monday   - plan for ROSALBA     see above. 69 year old male, with PMH of HTN, DM2, ?CVA/TIA, Aflutter, CAD s/p CABG (2018), EtOH use disorder (two 40oz beers daily), current smoker (8 cigs/day), L total hip arthroplasty, initially admitted to [facility] (9/14/24-10/4/24) after mech fall c/b R prox humeral fx and EtOH withdrawal. Was on CIWA with Librium/Ativan PRN. TTE on 9/15/24 showed LVEF 60-65%, grad I DD, and an aortic valve prosthesis with normal function. Course c/b AMS, acute hypoxemic/hypercapnic respiratory, and agonal breathing on 9/18/24. Was initially given flumazenil x2 with some improvement, but was intubated for airway protection given excessive secretions and transferred to MICU 9/18/24. CTA chest was somewhat limited study but did not find PE at the time, noted RUL GGOs "suspicious for atypical or viral infection." CTH showed subacute and chronic SDHs as well as chronic lacunar infarct within L lentiform nucleus. Repeat CT showed stable SDHs. Extubated on 9/19/24. Pt was downgraded to Medicine floors on 9/21/24. Noted to have loss of voice and dysphagia. Evaluated by S+S, found dysphagia on video flouroscopy to all textures and recommended for PEG tube, but pt refused and opted for NGT placement. Ortho surgery that was planned for humeral fx was cancelled and ultimately recommended for conservative management only. CXR on 10/3/24 showed new LLL infiltrate, started on Zosyn. On 10/4/24, pt desatted on supplemental O2, CTA chest found massive PE in R main bronchus with pulm trunk dilatation. BLE duplex also found b/l LE DVTs (L>R). Started on hep gtt and transferred to [facility] for embolectomy evaluation.      #Acute hypoxemic respiratory failure  #R main bronchus PE, b/l LE DVTs  #?PNA  #CVA  #Dysphagia  #R humeral fx  #Chronic aflutter  #DM2  #EtOH/tobacco use disorder    - on 2L NC, wean off oxygen and monitor   - c/w empiric zosyn, last dose today   - c/w amio and metoprolol (was on at OSH)  - c/w advair BID and duonebs q6h PRN  - c/w low ISS for now, monitor FS  - monitor on telemetry  - MRI head showing acute/subacute infarcts; neuro eval pending   - statin, thiamine  - TTE no acute findings   - speech and swallow, NPO, FEES -> did not pass study   - transitioned to eliquis 10mg BID for 7 days (including heparin drip days); now back on heparin drip for planned PEG tube placement; when ready to switch back to eliquis, will be on 5mg BID  - ortho - outpatient f/u   - started on NG feeds 10/5 but patient pulled out NGT 10/6 overnight; ENT replaced NGT on 10/7. Resume NGT feeds; dietitian recs noted    - plan for PEG placement tuesday,   - cardio clearance monday   - plan for ROSALBA     see above.

## 2024-10-12 NOTE — PROGRESS NOTE ADULT - PROBLEM SELECTOR PLAN 1
- Hospital course at [facility] c/b AHRF  - CTA chest (10/4) -> Large PE of R main pulmonary artery  - US of b/l LEs -> R peroneal vein and L popliteal, tibial trunk, peroneal, and posterior tibial DVTs  - EKG showing evidence of right heart strain, S1Q3T3 changes, T wave depressions in precordial leads; not present on EKG from 9/15  - TTE (9/15) unremarkable  - Troponin 92, BNP 7168 on transfer  > CTH not acutely concerning  > C/w heparin gtt w/ target PTT-> 0.4-0.6 Repeat TTE not concerning  > Vascular cardiology consulted rec heparin for now and eventual transition to DOAC  - Neurosurg consulted for AC management, they said okay to PTT 60-80  - Transitioned to DOAC loading eliquis but transitioned back to heparin pending PEG placement

## 2024-10-13 LAB
ALBUMIN SERPL ELPH-MCNC: 3.6 G/DL — SIGNIFICANT CHANGE UP (ref 3.3–5)
ALP SERPL-CCNC: 97 U/L — SIGNIFICANT CHANGE UP (ref 40–120)
ALT FLD-CCNC: 14 U/L — SIGNIFICANT CHANGE UP (ref 10–45)
ANION GAP SERPL CALC-SCNC: 13 MMOL/L — SIGNIFICANT CHANGE UP (ref 5–17)
APTT BLD: 70.3 SEC — HIGH (ref 24.5–35.6)
APTT BLD: 76.9 SEC — HIGH (ref 24.5–35.6)
AST SERPL-CCNC: 19 U/L — SIGNIFICANT CHANGE UP (ref 10–40)
BASOPHILS # BLD AUTO: 0.04 K/UL — SIGNIFICANT CHANGE UP (ref 0–0.2)
BASOPHILS NFR BLD AUTO: 0.6 % — SIGNIFICANT CHANGE UP (ref 0–2)
BILIRUB SERPL-MCNC: 0.3 MG/DL — SIGNIFICANT CHANGE UP (ref 0.2–1.2)
BUN SERPL-MCNC: 12 MG/DL — SIGNIFICANT CHANGE UP (ref 7–23)
CALCIUM SERPL-MCNC: 10 MG/DL — SIGNIFICANT CHANGE UP (ref 8.4–10.5)
CHLORIDE SERPL-SCNC: 96 MMOL/L — SIGNIFICANT CHANGE UP (ref 96–108)
CO2 SERPL-SCNC: 29 MMOL/L — SIGNIFICANT CHANGE UP (ref 22–31)
CREAT SERPL-MCNC: 0.74 MG/DL — SIGNIFICANT CHANGE UP (ref 0.5–1.3)
EGFR: 99 ML/MIN/1.73M2 — SIGNIFICANT CHANGE UP
EOSINOPHIL # BLD AUTO: 0.23 K/UL — SIGNIFICANT CHANGE UP (ref 0–0.5)
EOSINOPHIL NFR BLD AUTO: 3.2 % — SIGNIFICANT CHANGE UP (ref 0–6)
GLUCOSE BLDC GLUCOMTR-MCNC: 127 MG/DL — HIGH (ref 70–99)
GLUCOSE BLDC GLUCOMTR-MCNC: 135 MG/DL — HIGH (ref 70–99)
GLUCOSE BLDC GLUCOMTR-MCNC: 137 MG/DL — HIGH (ref 70–99)
GLUCOSE BLDC GLUCOMTR-MCNC: 189 MG/DL — HIGH (ref 70–99)
GLUCOSE SERPL-MCNC: 122 MG/DL — HIGH (ref 70–99)
HCT VFR BLD CALC: 32.5 % — LOW (ref 39–50)
HGB BLD-MCNC: 10.4 G/DL — LOW (ref 13–17)
IMM GRANULOCYTES NFR BLD AUTO: 0.4 % — SIGNIFICANT CHANGE UP (ref 0–0.9)
LYMPHOCYTES # BLD AUTO: 1.81 K/UL — SIGNIFICANT CHANGE UP (ref 1–3.3)
LYMPHOCYTES # BLD AUTO: 25.4 % — SIGNIFICANT CHANGE UP (ref 13–44)
MAGNESIUM SERPL-MCNC: 2 MG/DL — SIGNIFICANT CHANGE UP (ref 1.6–2.6)
MCHC RBC-ENTMCNC: 30.1 PG — SIGNIFICANT CHANGE UP (ref 27–34)
MCHC RBC-ENTMCNC: 32 GM/DL — SIGNIFICANT CHANGE UP (ref 32–36)
MCV RBC AUTO: 93.9 FL — SIGNIFICANT CHANGE UP (ref 80–100)
MONOCYTES # BLD AUTO: 0.73 K/UL — SIGNIFICANT CHANGE UP (ref 0–0.9)
MONOCYTES NFR BLD AUTO: 10.2 % — SIGNIFICANT CHANGE UP (ref 2–14)
NEUTROPHILS # BLD AUTO: 4.3 K/UL — SIGNIFICANT CHANGE UP (ref 1.8–7.4)
NEUTROPHILS NFR BLD AUTO: 60.2 % — SIGNIFICANT CHANGE UP (ref 43–77)
NRBC # BLD: 0 /100 WBCS — SIGNIFICANT CHANGE UP (ref 0–0)
PHOSPHATE SERPL-MCNC: 4.5 MG/DL — SIGNIFICANT CHANGE UP (ref 2.5–4.5)
PLATELET # BLD AUTO: 249 K/UL — SIGNIFICANT CHANGE UP (ref 150–400)
POTASSIUM SERPL-MCNC: 4.1 MMOL/L — SIGNIFICANT CHANGE UP (ref 3.5–5.3)
POTASSIUM SERPL-SCNC: 4.1 MMOL/L — SIGNIFICANT CHANGE UP (ref 3.5–5.3)
PROT SERPL-MCNC: 7.3 G/DL — SIGNIFICANT CHANGE UP (ref 6–8.3)
RBC # BLD: 3.46 M/UL — LOW (ref 4.2–5.8)
RBC # FLD: 13.6 % — SIGNIFICANT CHANGE UP (ref 10.3–14.5)
SODIUM SERPL-SCNC: 138 MMOL/L — SIGNIFICANT CHANGE UP (ref 135–145)
TROPONIN T, HIGH SENSITIVITY RESULT: 28 NG/L — SIGNIFICANT CHANGE UP (ref 0–51)
WBC # BLD: 7.14 K/UL — SIGNIFICANT CHANGE UP (ref 3.8–10.5)
WBC # FLD AUTO: 7.14 K/UL — SIGNIFICANT CHANGE UP (ref 3.8–10.5)

## 2024-10-13 PROCEDURE — 93010 ELECTROCARDIOGRAM REPORT: CPT

## 2024-10-13 PROCEDURE — 99232 SBSQ HOSP IP/OBS MODERATE 35: CPT

## 2024-10-13 RX ORDER — ACETAMINOPHEN 500 MG
1000 TABLET ORAL ONCE
Refills: 0 | Status: COMPLETED | OUTPATIENT
Start: 2024-10-13 | End: 2024-10-13

## 2024-10-13 RX ADMIN — Medication 1: at 11:15

## 2024-10-13 RX ADMIN — Medication 40 MILLIGRAM(S): at 22:00

## 2024-10-13 RX ADMIN — Medication 25 MILLIGRAM(S): at 05:51

## 2024-10-13 RX ADMIN — Medication 0: at 05:52

## 2024-10-13 RX ADMIN — Medication 25 MILLIGRAM(S): at 17:49

## 2024-10-13 RX ADMIN — Medication 100 MILLIGRAM(S): at 11:15

## 2024-10-13 RX ADMIN — Medication 10 MILLIGRAM(S): at 05:51

## 2024-10-13 RX ADMIN — FLUTICASONE PROPIONATE AND SALMETEROL XINAFOATE 1 DOSE(S): 230; 21 AEROSOL, METERED RESPIRATORY (INHALATION) at 17:49

## 2024-10-13 RX ADMIN — HEPARIN SODIUM 10 UNIT(S)/HR: 10000 INJECTION INTRAVENOUS; SUBCUTANEOUS at 10:49

## 2024-10-13 RX ADMIN — Medication 0: at 22:00

## 2024-10-13 RX ADMIN — POLYETHYLENE GLYCOL 3350 17 GRAM(S): 17 POWDER, FOR SOLUTION ORAL at 11:17

## 2024-10-13 RX ADMIN — HEPARIN SODIUM 10 UNIT(S)/HR: 10000 INJECTION INTRAVENOUS; SUBCUTANEOUS at 22:00

## 2024-10-13 RX ADMIN — Medication 200 MILLIGRAM(S): at 05:51

## 2024-10-13 RX ADMIN — PANTOPRAZOLE SODIUM 40 MILLIGRAM(S): 40 TABLET, DELAYED RELEASE ORAL at 11:12

## 2024-10-13 RX ADMIN — FLUTICASONE PROPIONATE AND SALMETEROL XINAFOATE 1 DOSE(S): 230; 21 AEROSOL, METERED RESPIRATORY (INHALATION) at 05:52

## 2024-10-13 RX ADMIN — HEPARIN SODIUM 10 UNIT(S)/HR: 10000 INJECTION INTRAVENOUS; SUBCUTANEOUS at 03:02

## 2024-10-13 NOTE — PROGRESS NOTE ADULT - PROBLEM SELECTOR PLAN 4
- Initially presented to [facility] on 9/14 for a mechanical fall after he tripped and fell onto his R shoulder  - XR showing R proximal humerus fracture, ORIF with Orthopedic Surgery at [facility] cancelled due to MICU course  > Orthopedic surgery on 10/7 said do outpt surgery.

## 2024-10-13 NOTE — PROVIDER CONTACT NOTE (OTHER) - ASSESSMENT
Pt a&ox2. VSS. No s/s of active bleed noted. Heparin gtt currently at 15mL/hr.
No s/s of bleeding.
A&Ox2. No s/s of sob, difficulty breathing, chest pain. No s/s of pain or discomfort. Agitated- keeps repeating "please leave me alone" and that he's "not playing" with me despite educating on importance of medication.
Pt AAOx2. VSS, on 4LNC, SR on tele; denies CP, SOB, no acute events.
Tele Event: Accelerated junctional  Patient asymptomatic, no c/o chest pains, SOB, palpitations
FS repeated 166 mg/dl  Patient asymptomatic
Pt a/ox1-2, VSS. Pt noted to be restless and pulling at lines including IV and nasal cannula. Pt currently under enhanced observation.
AAOx2-3. VSS, NGT in place at goal 60ml, heparin gtt at 10ml at ordered.
Patient asymptomatic

## 2024-10-13 NOTE — PROVIDER CONTACT NOTE (OTHER) - DATE AND TIME:
05-Oct-2024 09:36
06-Oct-2024 00:57
11-Oct-2024 15:46
69yo M hx of DM, HTN, CAD s/p PCI, CHF on lasix in past but has been off lasix for years unknown EF, PUD c/b GIB in plast, here w/ complaint of over 6 episodes of intermittent (lasting seconds) CP w/ radiation to shoulder, neck and back, with associated nausea. No diaphoresis or SOB. All episodes at rest or with regular walking. Has had mild CP in past but this is most severe episode in years. Used to follow up w/ cards at [place], but in process of changing doctors. Did see Dr. Elias here for preop clearance a few months ago, states had a echocardiogram but unknown results. Endorses chronic pedal edema, states was worse in past requiring lasix, states a few years ago transitioned to ?captopril, recently edema has been increasing but is having good effect w/ compression stockings.
08-Oct-2024 07:44
07-Oct-2024 23:16
13-Oct-2024 13:15
05-Oct-2024 20:44
11-Oct-2024 05:51
05-Oct-2024 10:59

## 2024-10-13 NOTE — PROVIDER CONTACT NOTE (OTHER) - RECOMMENDATIONS
Notify provider.
EKG done, notify provider
NNO
Keep heparin gtt at 14mL/hr.
NNO
New NG tube and possible restraints.
Notify Provider.
Notify provider. Adjust heparin gtt rate order?
NNO
36.6

## 2024-10-13 NOTE — PROGRESS NOTE ADULT - PROBLEM SELECTOR PLAN 7
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  - per neuro, ordered myasthenia gravis labs, outpatient f/u, can consider ENT/GI consult  - outpatient f/u  - s/p FEES 10/10, recommended to remain NPO  - GI consulted for PEG placement, planned 10/15, obtain cardiac clearance 10/14

## 2024-10-13 NOTE — PROVIDER CONTACT NOTE (OTHER) - BACKGROUND
Pt admitted for pulmonary embolism. PMH HTN, DM, CVA, CAD, AFlutter, alcohol use disorder.
Pt admitted with Other pulmonary embolism without acute cor pulmonale.
68 yo M admitted for s/p fall, right proximal humerus fx
CTA chest showing large PE of R main pulmonary artery. US of b/l LEs was further significant for b/l DVTs.
70 yo M admitted for s/p fall, right proximal humerus fx
Mr. Jarrett Overton is a 69-year-old w/ PMH of HTN, T2DM, CVA, A. flutter, CAD s/p CABG x3 (~2018), and alcohol use disorder who initially presented to [facility] on 9/14 for a
Pt adm with hypoxia, s/p fall, PE, DVT
68 yo M admitted for s/p fall, right proximal humerus fx
Pt admitted for PE on heparin gtt 16 mL/hr. Hx of CABGx3, intubation/extubation, CAD, DVTs. Pt is s/p constant observation.

## 2024-10-13 NOTE — PROGRESS NOTE ADULT - PROBLEM SELECTOR PLAN 8
10/8 MRI brain: Small acute/subacute infarcts within the right cerebellar   hemisphere and left posterior centrum semiovale with associated cytotoxic   edema  -Neuro consulted  - statin 40  - target LDL <70, SBP <130  - start thiamine 10/8 MRI brain: Small acute/subacute infarcts within the right cerebellar   hemisphere and left posterior centrum semiovale with associated cytotoxic   edema  -Neuro consulted  - statin 40  - target LDL <70, SBP <130  - start thiamine  -f/u neuro outpt

## 2024-10-13 NOTE — PROGRESS NOTE ADULT - ASSESSMENT
Mr. Jarrett Overton is a 69-year-old w/ PMH of HTN, T2DM, CVA, A. flutter, CAD s/p CABG x3 (~2018), and alcohol use disorder who initially presented to [facility] on 9/14 for a mechanical fall. On 10/4, patient was found to be hypoxic with subsequent CTA chest showing large PE of R main pulmonary artery. US of b/l LEs was further significant for b/l DVTs in the R peroneal vein and L popliteal, tibial trunk, peroneal, and posterior tibial veins. Patient was transferred to [facility] for further management of PE and embolectomy evaluation.

## 2024-10-13 NOTE — PROVIDER CONTACT NOTE (OTHER) - ACTION/TREATMENT ORDERED:
MD aware; ok to keep heparin gtt at 14mL/hr. Goal range 60-80.
Will continue to monitor.
provider aware, POC ongoing.
Will assess pt at bedside and reinsert NG tube. Continue monitoring.
Will continue to monitor
Provider aware, still awaiting update/answer about adjusting heparin gtt rate. No orders changed at this time.
Provider aware, hep gtt rate decreased from 16ml/hr to 15ml/hr, will redraw ptt in 6hrs.
Provider made aware. Will inform day nurse to reapproach patient when he is not agitated and aggressive.
Will continue to monitor.

## 2024-10-13 NOTE — PROGRESS NOTE ADULT - PROBLEM SELECTOR PLAN 1
- Hospital course at [facility] c/b AHRF  - CTA chest (10/4) -> Large PE of R main pulmonary artery  - US of b/l LEs -> R peroneal vein and L popliteal, tibial trunk, peroneal, and posterior tibial DVTs  - EKG showing evidence of right heart strain, S1Q3T3 changes, T wave depressions in precordial leads; not present on EKG from 9/15  - TTE (9/15) unremarkable  - Troponin 92, BNP 7168 on transfer  > CTH not acutely concerning  > C/w heparin gtt w/ target PTT-> 0.4-0.6 Repeat TTE not concerning  > Vascular cardiology consulted rec heparin for now and eventual transition to DOAC  - Neurosurg consulted for AC management, they said okay to PTT 60-80  - Transitioned to DOAC loading eliquis but transitioned back to heparin pending PEG placement - Hospital course at [facility] c/b AHRF  - CTA chest (10/4) -> Large PE of R main pulmonary artery  - US of b/l LEs -> R peroneal vein and L popliteal, tibial trunk, peroneal, and posterior tibial DVTs  - EKG showing evidence of right heart strain, S1Q3T3 changes, T wave depressions in precordial leads; not present on EKG from 9/15  - TTE (9/15) unremarkable  - Troponin 92, BNP 7168 on transfer  > CTH not acutely concerning  > C/w heparin gtt w/ target PTT-> 0.4-0.6 Repeat TTE not concerning  > Vascular cardiology consulted rec heparin for now and eventual transition to DOAC  - Neurosurg consulted for AC management, they said okay to PTT 60-80  - Transitioned to DOAC loading eliquis but transitioned back to heparin pending PEG placement (need to stop 6 hours before procedure and obtain cardiac clearance on Monday)

## 2024-10-13 NOTE — PROGRESS NOTE ADULT - ATTENDING COMMENTS
69 year old male, with PMH of HTN, DM2, ?CVA/TIA, Aflutter, CAD s/p CABG (2018), EtOH use disorder (two 40oz beers daily), current smoker (8 cigs/day), L total hip arthroplasty, initially admitted to [facility] (9/14/24-10/4/24) after mech fall c/b R prox humeral fx and EtOH withdrawal. Was on CIWA with Librium/Ativan PRN. TTE on 9/15/24 showed LVEF 60-65%, grad I DD, and an aortic valve prosthesis with normal function. Course c/b AMS, acute hypoxemic/hypercapnic respiratory, and agonal breathing on 9/18/24. Was initially given flumazenil x2 with some improvement, but was intubated for airway protection given excessive secretions and transferred to MICU 9/18/24. CTA chest was somewhat limited study but did not find PE at the time, noted RUL GGOs "suspicious for atypical or viral infection." CTH showed subacute and chronic SDHs as well as chronic lacunar infarct within L lentiform nucleus. Repeat CT showed stable SDHs. Extubated on 9/19/24. Pt was downgraded to Medicine floors on 9/21/24. Noted to have loss of voice and dysphagia. Evaluated by S+S, found dysphagia on video flouroscopy to all textures and recommended for PEG tube, but pt refused and opted for NGT placement. Ortho surgery that was planned for humeral fx was cancelled and ultimately recommended for conservative management only. CXR on 10/3/24 showed new LLL infiltrate, started on Zosyn. On 10/4/24, pt desatted on supplemental O2, CTA chest found massive PE in R main bronchus with pulm trunk dilatation. BLE duplex also found b/l LE DVTs (L>R). Started on hep gtt and transferred to [facility] for embolectomy evaluation.      #Acute hypoxemic respiratory failure  #R main bronchus PE, b/l LE DVTs  #?PNA  #CVA  #Dysphagia  #R humeral fx  #Chronic aflutter  #DM2  #EtOH/tobacco use disorder    - on RA  - s/p zosyn   - c/w amio and metoprolol (was on at OSH)  - c/w advair BID and duonebs q6h PRN  - c/w low ISS for now, monitor FS  - monitor on telemetry  - MRI head showing acute/subacute infarcts; neuro eval pending   - statin, thiamine  - TTE no acute findings   - speech and swallow, NPO, FEES -> did not pass study   - transitioned to eliquis 10mg BID for 7 days (including heparin drip days); now back on heparin drip for planned PEG tube placement; when ready to switch back to eliquis, will be on 5mg BID  - ortho - outpatient f/u   - started on NG feeds 10/5 but patient pulled out NGT 10/6 overnight; ENT replaced NGT on 10/7. Resume NGT feeds; dietitian recs noted    - plan for PEG placement tuesday,   - cardio clearance monday   - plan for ROSALBA     rest as above.

## 2024-10-13 NOTE — PROGRESS NOTE ADULT - SUBJECTIVE AND OBJECTIVE BOX
Neurology Progress Note    S: Patient seen and examined. No new events overnight.     Medication:  albuterol/ipratropium for Nebulization 3 milliLiter(s) Nebulizer every 6 hours PRN  aMIOdarone    Tablet 200 milliGRAM(s) Oral daily  amLODIPine   Tablet 10 milliGRAM(s) Oral daily  atorvastatin 40 milliGRAM(s) Oral at bedtime  dextrose 5%. 1000 milliLiter(s) IV Continuous <Continuous>  dextrose 5%. 1000 milliLiter(s) IV Continuous <Continuous>  dextrose 50% Injectable 12.5 Gram(s) IV Push once  dextrose 50% Injectable 25 Gram(s) IV Push once  dextrose 50% Injectable 25 Gram(s) IV Push once  dextrose Oral Gel 15 Gram(s) Oral once  fluticasone propionate/ salmeterol 250-50 MICROgram(s) Diskus 1 Dose(s) Inhalation two times a day  glucagon  Injectable 1 milliGRAM(s) IntraMuscular once  heparin  Infusion 1400 Unit(s)/Hr IV Continuous <Continuous>  insulin lispro (ADMELOG) corrective regimen sliding scale   SubCutaneous at bedtime  insulin lispro (ADMELOG) corrective regimen sliding scale   SubCutaneous every 6 hours  metoprolol tartrate 25 milliGRAM(s) Oral two times a day  pantoprazole  Injectable 40 milliGRAM(s) IV Push daily  polyethylene glycol 3350 17 Gram(s) Oral daily  thiamine 100 milliGRAM(s) Oral daily      Vitals:  Vital Signs Last 24 Hrs  T(C): 36.7 (13 Oct 2024 04:54), Max: 36.9 (12 Oct 2024 17:48)  T(F): 98 (13 Oct 2024 04:54), Max: 98.5 (12 Oct 2024 17:48)  HR: 74 (13 Oct 2024 04:54) (72 - 80)  BP: 111/76 (13 Oct 2024 04:54) (100/71 - 111/76)  BP(mean): --  RR: 18 (13 Oct 2024 04:54) (18 - 19)  SpO2: 97% (13 Oct 2024 04:54) (87% - 99%)    Parameters below as of 13 Oct 2024 04:54  Patient On (Oxygen Delivery Method): nasal cannula  O2 Flow (L/min): 2      General Exam:   General Appearance: Appropriately dressed and in no acute distress       Head: Normocephalic, atraumatic and no dysmorphic features  Ear, Nose, and Throat: Moist mucous membranes +NGT   CVS: S1S2+  Resp: No SOB, no wheeze or rhonchi  Abd: soft NTND  Extremities: No edema, no cyanosis  Skin: No bruises, no rashes     Neurological Exam:    - Awake, Alert  - Oriented to: person/age. Knows he is in the hospital, but not which. Does not know month/year--> better AAOx2 now   - Speech: fluent  - Repetition and naming: intact   - Follows simple commands. Performs cross command incorrectly - does not cross.  - Fund of knowledge: knows current president    Cranial nerves - PERRL, BTT b/l, EOMI - there is no difficulty with prolonged upgaze, no eyelid ptosis, face sensation (V1-V3) intact b/l, facial strength intact without asymmetry b/l - no eyelid closure weakness, no air escape when puffed cheeks are pushed in, hearing grossly intact, palate with symmetric elevation, shoulder shrug likely slightly reduced R side d/t orthopedic limitation, tongue midline on protrusion with full lateral movement  Dysarthria: not clearly dysarthric but extremely hypophonic    Motor -  No drift in the LUE x at least 10 seconds  He can move his RUE distally, but cannot lift it AG - orthopedic limitation d/t R proximal humerus fracture  LLE/RLE - no drift x at least 5 seconds b/l    Sensation - Light touch intact throughout    No ankle clonus    Coordination - FNF intact LUE (cannot perform RUE d/t orthopedic limitation), HTS intact RLE, cannot perform LLE but suspect d/t orthopedic limitation - toe to finger intact b/l.    Gait and station - Unable to assess d/t fall risk/safety concerns.      I personally reviewed the below data/images/labs:      CBC Full  -  ( 13 Oct 2024 06:53 )  WBC Count : 7.14 K/uL  RBC Count : 3.46 M/uL  Hemoglobin : 10.4 g/dL  Hematocrit : 32.5 %  Platelet Count - Automated : 249 K/uL  Mean Cell Volume : 93.9 fl  Mean Cell Hemoglobin : 30.1 pg  Mean Cell Hemoglobin Concentration : 32.0 gm/dL  Auto Neutrophil # : 4.30 K/uL  Auto Lymphocyte # : 1.81 K/uL  Auto Monocyte # : 0.73 K/uL  Auto Eosinophil # : 0.23 K/uL  Auto Basophil # : 0.04 K/uL  Auto Neutrophil % : 60.2 %  Auto Lymphocyte % : 25.4 %  Auto Monocyte % : 10.2 %  Auto Eosinophil % : 3.2 %  Auto Basophil % : 0.6 %    10-13    138  |  96  |  12  ----------------------------<  122[H]  4.1   |  29  |  0.74    Ca    10.0      13 Oct 2024 06:55  Phos  4.5     10-13  Mg     2.0     10-13    TPro  7.3  /  Alb  3.6  /  TBili  0.3  /  DBili  x   /  AST  19  /  ALT  14  /  AlkPhos  97  10-13    LIVER FUNCTIONS - ( 13 Oct 2024 06:55 )  Alb: 3.6 g/dL / Pro: 7.3 g/dL / ALK PHOS: 97 U/L / ALT: 14 U/L / AST: 19 U/L / GGT: x           PTT - ( 13 Oct 2024 02:31 )  PTT:76.9 sec  Urinalysis Basic - ( 13 Oct 2024 06:55 )    Color: x / Appearance: x / SG: x / pH: x  Gluc: 122 mg/dL / Ketone: x  / Bili: x / Urobili: x   Blood: x / Protein: x / Nitrite: x   Leuk Esterase: x / RBC: x / WBC x   Sq Epi: x / Non Sq Epi: x / Bacteria: x      :  MR BRAIN WAW IC    PROCEDURE DATE:  10/08/2024      INTERPRETATION:  .    CLINICAL INFORMATION: Dysphagia. Subacute on chronic subdural hematomas   on outside head CT.    TECHNIQUE: Multiplanar multisequential MRI of the brain was acquired with   and without the administration of IV gadolinium. 7.5 cc's of IV Gadavist   was administered for the purposes of this examination. 0 cc were   discarded.    COMPARISON: Prior CT study of the head from 10/7/2024. Outside CT study   of the head dated 9/19/2024.    FINDINGS: There is a small focus of diffusion restriction within the   right cerebellar hemisphere (series 5, image 38). An additional small   focus of diffusion reduction is seen within the left posterior centrum   semiovale (series 5, image 54). Corresponding areas of T2/FLAIR   hyperintense signal change are seen in these areas.    A small area of encephalomalacia and gliosis is seen within the left   basal ganglia.    Multiple additional patchy confluent nonspecific T2/FLAIR hyperintense   signal changes are noted throughout the bihemispheric white matter   without associated mass effect or restricted diffusion.    There is no abnormal brain parenchymal or leptomeningeal enhancement.    No hydrocephalus is seen. There are no abnormal extra-axial fluid   collections. Flow-voids are noted throughout the major intracranial   vessels, on the T2 weighted images, consistent with their patency. The   sella turcica and posterior fossa are unremarkable.    Mild scattered mucosal thickening is seen throughout the paranasal   sinuses. The bilateral tympanomastoid cavities are clear. The calvarium   appears intact. The orbits appear unremarkable apart from a disconjugate   gaze.    IMPRESSION: Small acute/subacute infarcts within the right cerebellar   hemisphere and left posterior centrum semiovale with associated cytotoxic   edema.    No acute intracranial hemorrhage.    Multiple patchy confluent nonspecific abnormal white matter foci of   T2/FLAIR prolongation statistically favoring microvascular type changes.    Additional small area of encephalomalacia and gliosis within the left   basal ganglia which may related to chronic infarction and/or hemorrhage.

## 2024-10-13 NOTE — PROVIDER CONTACT NOTE (OTHER) - SITUATION
Pt c/o chest pain on the R side, pain scale 7/10. /74, HR 74, SPO2 94%
Patient is on a patient specific heparin nomogram with goal PTT 60-80. PTT resulted at 81
Patient refusing care- will not allow writer to touch NG tube or IV access. Also refusing incontinence care.
Pt found to have pulled out NG tube.
Critical Lab : Hep Assay > 2.0
Critical Lab- Glucose 621, Ionized calcium 0.65 all from drawn VBG
Tele Event: Accelerated junctional
Pt ptt 85.9 supratherapeutic from goal.
Patient aptt resulted at 70.7; keep rate same or change

## 2024-10-13 NOTE — PROGRESS NOTE ADULT - ASSESSMENT
69-year-old PMH of HTN, T2DM, ?stroke, atrial flutter, CAD s/p CABG x3 (~2018), and alcohol use disorder who initially presented to [facility] on 9/14 for a mechanical fall after he tripped and fell onto his R shoulder. He was diagnosed with R proximal humerus fracture with planned ORIF with Orthopedic Surgery. Patient's course was c/b alcohol w/d and he was started on CIWA protocol with chlordiazepoxide. Patient experienced acute hypercapnic RF - thought to be d/t benzodiazepine use. Was given flumazenil and was intubated. Transferred to MICU. His ORIF was cancelled d/t CT head with evidence of subacute to chronic SDHs. Extubated 9/19 - with ongoing dysphagia since. He refused PEG placement - NGT in place. On 10/4 - he was found to be hypoxic with CTA chest showing large PE of the R main PA. US of the LEs disclosed multiple b/l DVTs. Transferred to [facility] for further management of this PE. Given history of subacute vs. chronic subdural hematomas, patient underwent CTH on 10/7/2024 - exam degraded by motion at the vertex. Primary team d/w NSGY - they were OK with initiating heparin gtt. Patient now transitioned to apixaban 10mg BID for VTE. Underwent MRI brain on 10/8/2024, given ongoing dysphagia since extubation on 9/19/2024. Punctate infarcts seen in the right cerebellum and left centrum semiovale. Additionally, patient underwent swallow bedside assessment on 10/9 - he has prolonged bolus manipulation, prolonged bolus transport, and there is suspicion for laryngeal penetration/aspiration. Due for FEES. Vascular Neurology is consulted for MRI findings.    Recent vitals notable for: afebrile, HR 70s-80s, BP to 149/80, RR 18, SpO2 94-98% on 4L O2 via NC. Labs notable for: Hg 9.6, potassium 3.4, glucose 136, A1c 6.1. MRI brain w/wo with punctate acute/subacute infarcts in the R cerebellum and left centrum semiovale, possible chronic infarct in the left basal ganglia, and white matter hyperintensities of presumed vascular origin. Exam notable for chronically ill-appearing man with NGT in place, RUE immobilized in sling, there is no difficulty with prolonged upgaze, no eyelid ptosis, facial strength intact without asymmetry b/l - no eyelid closure weakness, no air escape when puffed cheeks are pushed in, +hypophonia, no drift in his extremities (other than RUE which has a proximal humeral fracture).  NIHSS: 4 (+1 questions, +3 RUE)  LKN: Unknown  pre-MRS: estimated at least 1  MRI brain w/wo with punctate acute/subacute infarcts in the R cerebellum and left centrum semiovale, possible chronic infarct in the left basal ganglia, and white matter hyperintensities  A1c 6.1 (10/5/2024)  TTE (10/5/2024): LVEF 60-65%, no RWMA, normal left atrium, intact interatrial septum      Impression:   1) Incidental infarcts in the right cerebellum and left centrum semiovale. Mechanism cardioembolic d/t atrial flutter.  2) Isolated dysphagia/dysphonia - MRI brain w/wo unrevealing with regard to etiology. Exam is not c/w myasthenia gravis.  3) mechanical fall   4) h/o SDH   5) ETOH abuse     Recommendations:  - Dysphagia screen already performed - on tube feeds via NGT  - SBP goal - OK for normotension from neurovascular perspective (BP goal <130/80)  - Continue apixaban for lifelong anticoagulation for atrial flutter - currently on loading dose 10mg BID for VTE, he can continue 5mg BID for secondary stroke prevention when dose is reduced  - Regarding initiation of statin -  would start atorvastatin 40mg QHS if no objection, target LDL <70 for secondary stroke prevention  - CT angiogram of the head/neck w/IV contrast can be performed on a nonurgent basis to complete stroke workup but unlikely to change stroke management in this patient  - Vitals ordered q4h per primary, likely does not need routine neurochecks at this time (from neurovascular perspective) given infarcts are asymptomatic  - Fall and aspiration precautions  - PT/OT  - DVT prophylaxis: therapeutic AC  - Stroke education provided  - Smoking cessation education provided  - Please document MRS on discharge  - for ETOH h/o would c/w thimiamine.  folic acid   - Regarding dysphagia/dysphonia:  Needs ongoing multidisciplinary workup. F/u SLP recommendations. Consider ENT and GI evaluations; from neuro standpoint no etiology for dysphagia.  Can obtain myasthenia gravis labs although very low clinical suspicion: acetylcholine blocking antibody, acetylcholine modulating antibody, acetylcholine receptor binding antibody, LRP4 autoantibody test, MuSK antibody test  - If no other explanation is found for symptoms: follow up outpatient with Neurology for further evaluation including possible EMG  - Upon discharge, he should follow up with Dr. Akbar Elena (Vascular Neurology): [address], [place] [address]. [phone].    Akbar Elena MD  Vascular Neurology  Office: [phone] .

## 2024-10-13 NOTE — PROGRESS NOTE ADULT - PROBLEM SELECTOR PLAN 2
- Hospital course at [facility] c/b AHRF  - S/p intubation on 9/18 for likely benzodiazepine overdose, extubated on 9/19  - Diffuse rhonchi with copious secretions on exam  - CTA chest (10/4) -> Large PE of R main pulmonary artery, scattered GGOs and interstitial changes predominantly in R and L lower lobes  - Likely PE +/- superimposed aspiration PNA  > C/w management of PE, as above  >Empiric Zosyn for 7d (10/5-10/12)  > C/w oxygen supplementation prn  - planto d/c nasal cannula today - Hospital course at [facility] c/b AHRF  - S/p intubation on 9/18 for likely benzodiazepine overdose, extubated on 9/19  - Diffuse rhonchi with copious secretions on exam  - CTA chest (10/4) -> Large PE of R main pulmonary artery, scattered GGOs and interstitial changes predominantly in R and L lower lobes  - Likely PE +/- superimposed aspiration PNA  > C/w management of PE, as above  >Empiric Zosyn for 7d (10/5-10/12)  - On RA

## 2024-10-13 NOTE — PROGRESS NOTE ADULT - SUBJECTIVE AND OBJECTIVE BOX
Nurse attempted to call patient to reschedule his surgery. Nurse told patient at the time of visit 12/26/23. Had to move cases to accommodate some other cases and tried to call patient and emergency contact to reschedule surgery date and can not leave  at this time.    PROGRESS NOTE:   Authored by: Stephanie Pedroza MD  PGY-1    Patient is a 69y old  Male who presents with a chief complaint of Pulmonary Embolism (12 Oct 2024 09:53)      SUBJECTIVE / OVERNIGHT EVENTS:  No acute events overnight. Pt doing well this morning.    MEDICATIONS  (STANDING):  aMIOdarone    Tablet 200 milliGRAM(s) Oral daily  amLODIPine   Tablet 10 milliGRAM(s) Oral daily  atorvastatin 40 milliGRAM(s) Oral at bedtime  dextrose 5%. 1000 milliLiter(s) (50 mL/Hr) IV Continuous <Continuous>  dextrose 5%. 1000 milliLiter(s) (100 mL/Hr) IV Continuous <Continuous>  dextrose 50% Injectable 12.5 Gram(s) IV Push once  dextrose 50% Injectable 25 Gram(s) IV Push once  dextrose 50% Injectable 25 Gram(s) IV Push once  dextrose Oral Gel 15 Gram(s) Oral once  fluticasone propionate/ salmeterol 250-50 MICROgram(s) Diskus 1 Dose(s) Inhalation two times a day  glucagon  Injectable 1 milliGRAM(s) IntraMuscular once  heparin  Infusion 1400 Unit(s)/Hr (10 mL/Hr) IV Continuous <Continuous>  insulin lispro (ADMELOG) corrective regimen sliding scale   SubCutaneous at bedtime  insulin lispro (ADMELOG) corrective regimen sliding scale   SubCutaneous every 6 hours  metoprolol tartrate 25 milliGRAM(s) Oral two times a day  pantoprazole  Injectable 40 milliGRAM(s) IV Push daily  polyethylene glycol 3350 17 Gram(s) Oral daily  thiamine 100 milliGRAM(s) Oral daily    MEDICATIONS  (PRN):  albuterol/ipratropium for Nebulization 3 milliLiter(s) Nebulizer every 6 hours PRN Shortness of Breath and/or Wheezing      CAPILLARY BLOOD GLUCOSE      POCT Blood Glucose.: 135 mg/dL (13 Oct 2024 04:59)  POCT Blood Glucose.: 141 mg/dL (12 Oct 2024 23:41)  POCT Blood Glucose.: 164 mg/dL (12 Oct 2024 22:14)  POCT Blood Glucose.: 151 mg/dL (12 Oct 2024 18:06)  POCT Blood Glucose.: 149 mg/dL (12 Oct 2024 11:41)    I&O's Summary    12 Oct 2024 07:01  -  13 Oct 2024 07:00  --------------------------------------------------------  IN: 700 mL / OUT: 1125 mL / NET: -425 mL        PHYSICAL EXAM:  Vital Signs Last 24 Hrs  T(C): 36.7 (13 Oct 2024 04:54), Max: 36.9 (12 Oct 2024 17:48)  T(F): 98 (13 Oct 2024 04:54), Max: 98.5 (12 Oct 2024 17:48)  HR: 74 (13 Oct 2024 04:54) (72 - 80)  BP: 111/76 (13 Oct 2024 04:54) (100/71 - 111/76)  BP(mean): --  RR: 18 (13 Oct 2024 04:54) (18 - 19)  SpO2: 97% (13 Oct 2024 04:54) (87% - 99%)    Parameters below as of 13 Oct 2024 04:54  Patient On (Oxygen Delivery Method): nasal cannula  O2 Flow (L/min): 2      CONSTITUTIONAL: NAD, well-developed  RESPIRATORY: Normal respiratory effort; lungs are clear to auscultation bilaterally  CARDIOVASCULAR: Regular rate and rhythm, normal S1 and S2, no murmur/rub/gallop; Peripheral pulses are 2+ bilaterally  ABDOMEN: Nontender to palpation, normoactive bowel sounds, no rebound/guarding  MUSCLOSKELETAL:  Moving all limbs spontaneously; No lower extremity edema  PSYCH: Affect appropriate    LABS:                        10.5   6.78  )-----------( 242      ( 12 Oct 2024 07:09 )             33.3     10-12    138  |  97  |  11  ----------------------------<  142[H]  4.1   |  30  |  0.84    Ca    10.0      12 Oct 2024 07:08  Phos  4.2     10-12  Mg     2.0     10-12    TPro  7.3  /  Alb  3.7  /  TBili  0.3  /  DBili  x   /  AST  18  /  ALT  13  /  AlkPhos  102  10-12    PTT - ( 13 Oct 2024 02:31 )  PTT:76.9 sec        MICRO:  Urinalysis Basic - ( 12 Oct 2024 07:08 )    Color: x / Appearance: x / SG: x / pH: x  Gluc: 142 mg/dL / Ketone: x  / Bili: x / Urobili: x   Blood: x / Protein: x / Nitrite: x   Leuk Esterase: x / RBC: x / WBC x   Sq Epi: x / Non Sq Epi: x / Bacteria: x          IMAGING: PROGRESS NOTE:   Authored by: Stephanie Pedroza MD  PGY-1    Patient is a 69y old  Male who presents with a chief complaint of Pulmonary Embolism (12 Oct 2024 09:53)      SUBJECTIVE / OVERNIGHT EVENTS:  No acute events overnight. Pt in his usual state this morning.    MEDICATIONS  (STANDING):  aMIOdarone    Tablet 200 milliGRAM(s) Oral daily  amLODIPine   Tablet 10 milliGRAM(s) Oral daily  atorvastatin 40 milliGRAM(s) Oral at bedtime  dextrose 5%. 1000 milliLiter(s) (50 mL/Hr) IV Continuous <Continuous>  dextrose 5%. 1000 milliLiter(s) (100 mL/Hr) IV Continuous <Continuous>  dextrose 50% Injectable 12.5 Gram(s) IV Push once  dextrose 50% Injectable 25 Gram(s) IV Push once  dextrose 50% Injectable 25 Gram(s) IV Push once  dextrose Oral Gel 15 Gram(s) Oral once  fluticasone propionate/ salmeterol 250-50 MICROgram(s) Diskus 1 Dose(s) Inhalation two times a day  glucagon  Injectable 1 milliGRAM(s) IntraMuscular once  heparin  Infusion 1400 Unit(s)/Hr (10 mL/Hr) IV Continuous <Continuous>  insulin lispro (ADMELOG) corrective regimen sliding scale   SubCutaneous at bedtime  insulin lispro (ADMELOG) corrective regimen sliding scale   SubCutaneous every 6 hours  metoprolol tartrate 25 milliGRAM(s) Oral two times a day  pantoprazole  Injectable 40 milliGRAM(s) IV Push daily  polyethylene glycol 3350 17 Gram(s) Oral daily  thiamine 100 milliGRAM(s) Oral daily    MEDICATIONS  (PRN):  albuterol/ipratropium for Nebulization 3 milliLiter(s) Nebulizer every 6 hours PRN Shortness of Breath and/or Wheezing      CAPILLARY BLOOD GLUCOSE      POCT Blood Glucose.: 135 mg/dL (13 Oct 2024 04:59)  POCT Blood Glucose.: 141 mg/dL (12 Oct 2024 23:41)  POCT Blood Glucose.: 164 mg/dL (12 Oct 2024 22:14)  POCT Blood Glucose.: 151 mg/dL (12 Oct 2024 18:06)  POCT Blood Glucose.: 149 mg/dL (12 Oct 2024 11:41)    I&O's Summary    12 Oct 2024 07:01  -  13 Oct 2024 07:00  --------------------------------------------------------  IN: 700 mL / OUT: 1125 mL / NET: -425 mL        PHYSICAL EXAM:  Vital Signs Last 24 Hrs  T(C): 36.7 (13 Oct 2024 04:54), Max: 36.9 (12 Oct 2024 17:48)  T(F): 98 (13 Oct 2024 04:54), Max: 98.5 (12 Oct 2024 17:48)  HR: 74 (13 Oct 2024 04:54) (72 - 80)  BP: 111/76 (13 Oct 2024 04:54) (100/71 - 111/76)  BP(mean): --  RR: 18 (13 Oct 2024 04:54) (18 - 19)  SpO2: 97% (13 Oct 2024 04:54) (87% - 99%)    Parameters below as of 13 Oct 2024 04:54  Patient On (Oxygen Delivery Method): nasal cannula  O2 Flow (L/min): 2    CONSTITUTIONAL: NAD, AOx2  RESPIRATORY: Normal respiratory effort; lungs are clear to auscultation bilaterally  CARDIOVASCULAR: Regular rate and rhythm, normal S1 and S2, no murmur/rub/gallop; Peripheral pulses are 2+ bilaterally  ABDOMEN: Nontender to palpation, normoactive bowel sounds, no rebound/guarding  MUSCLOSKELETAL:  Moving all limbs spontaneously; No lower extremity edema  PSYCH: Affect appropriate    LABS:                        10.5   6.78  )-----------( 242      ( 12 Oct 2024 07:09 )             33.3     10-12    138  |  97  |  11  ----------------------------<  142[H]  4.1   |  30  |  0.84    Ca    10.0      12 Oct 2024 07:08  Phos  4.2     10-12  Mg     2.0     10-12    TPro  7.3  /  Alb  3.7  /  TBili  0.3  /  DBili  x   /  AST  18  /  ALT  13  /  AlkPhos  102  10-12    PTT - ( 13 Oct 2024 02:31 )  PTT:76.9 sec        MICRO:  Urinalysis Basic - ( 12 Oct 2024 07:08 )    Color: x / Appearance: x / SG: x / pH: x  Gluc: 142 mg/dL / Ketone: x  / Bili: x / Urobili: x   Blood: x / Protein: x / Nitrite: x   Leuk Esterase: x / RBC: x / WBC x   Sq Epi: x / Non Sq Epi: x / Bacteria: x          IMAGING: no new

## 2024-10-13 NOTE — PROGRESS NOTE ADULT - PROBLEM SELECTOR PLAN 3
- US of b/l LEs at [facility] -> R peroneal vein and L popliteal, tibial trunk, peroneal, and posterior tibial DVTs  > Repeat US of b/l LEs showing mulitple DVTs  > C/w heparin as above

## 2024-10-13 NOTE — PROVIDER CONTACT NOTE (OTHER) - REASON
Critical Lab
Pt ptt 85.9 supratherapeutic from goal.
Critical Lab
Patient refusing care
Tele Event
Patient aptt resulted at 70.7; keep rate same or change
Pt c/o chest pain on the R side, pain scale 7/10. /74, HR 74, SPO2 94%.
NG tube removed per pt
PTT 81 patient on patient specific heparin nomogram

## 2024-10-14 LAB
ACRM BINDING ANTIBODY: <0.03 NMOL/L — SIGNIFICANT CHANGE UP (ref 0–0.24)
ALBUMIN SERPL ELPH-MCNC: 3.8 G/DL — SIGNIFICANT CHANGE UP (ref 3.3–5)
ALP SERPL-CCNC: 101 U/L — SIGNIFICANT CHANGE UP (ref 40–120)
ALT FLD-CCNC: 15 U/L — SIGNIFICANT CHANGE UP (ref 10–45)
ANION GAP SERPL CALC-SCNC: 13 MMOL/L — SIGNIFICANT CHANGE UP (ref 5–17)
APTT BLD: 53.8 SEC — HIGH (ref 24.5–35.6)
APTT BLD: 64 SEC — HIGH (ref 24.5–35.6)
AST SERPL-CCNC: 16 U/L — SIGNIFICANT CHANGE UP (ref 10–40)
BASOPHILS # BLD AUTO: 0.05 K/UL — SIGNIFICANT CHANGE UP (ref 0–0.2)
BASOPHILS NFR BLD AUTO: 0.9 % — SIGNIFICANT CHANGE UP (ref 0–2)
BILIRUB SERPL-MCNC: 0.3 MG/DL — SIGNIFICANT CHANGE UP (ref 0.2–1.2)
BUN SERPL-MCNC: 13 MG/DL — SIGNIFICANT CHANGE UP (ref 7–23)
CALCIUM SERPL-MCNC: 10.2 MG/DL — SIGNIFICANT CHANGE UP (ref 8.4–10.5)
CHLORIDE SERPL-SCNC: 96 MMOL/L — SIGNIFICANT CHANGE UP (ref 96–108)
CO2 SERPL-SCNC: 28 MMOL/L — SIGNIFICANT CHANGE UP (ref 22–31)
CREAT SERPL-MCNC: 0.71 MG/DL — SIGNIFICANT CHANGE UP (ref 0.5–1.3)
EGFR: 101 ML/MIN/1.73M2 — SIGNIFICANT CHANGE UP
EOSINOPHIL # BLD AUTO: 0.23 K/UL — SIGNIFICANT CHANGE UP (ref 0–0.5)
EOSINOPHIL NFR BLD AUTO: 4 % — SIGNIFICANT CHANGE UP (ref 0–6)
GLUCOSE BLDC GLUCOMTR-MCNC: 118 MG/DL — HIGH (ref 70–99)
GLUCOSE BLDC GLUCOMTR-MCNC: 122 MG/DL — HIGH (ref 70–99)
GLUCOSE BLDC GLUCOMTR-MCNC: 123 MG/DL — HIGH (ref 70–99)
GLUCOSE BLDC GLUCOMTR-MCNC: 123 MG/DL — HIGH (ref 70–99)
GLUCOSE BLDC GLUCOMTR-MCNC: 137 MG/DL — HIGH (ref 70–99)
GLUCOSE BLDC GLUCOMTR-MCNC: 139 MG/DL — HIGH (ref 70–99)
GLUCOSE SERPL-MCNC: 141 MG/DL — HIGH (ref 70–99)
HCT VFR BLD CALC: 34.4 % — LOW (ref 39–50)
HGB BLD-MCNC: 10.9 G/DL — LOW (ref 13–17)
IMM GRANULOCYTES NFR BLD AUTO: 0.5 % — SIGNIFICANT CHANGE UP (ref 0–0.9)
LYMPHOCYTES # BLD AUTO: 1.53 K/UL — SIGNIFICANT CHANGE UP (ref 1–3.3)
LYMPHOCYTES # BLD AUTO: 26.9 % — SIGNIFICANT CHANGE UP (ref 13–44)
MAGNESIUM SERPL-MCNC: 2 MG/DL — SIGNIFICANT CHANGE UP (ref 1.6–2.6)
MCHC RBC-ENTMCNC: 29.5 PG — SIGNIFICANT CHANGE UP (ref 27–34)
MCHC RBC-ENTMCNC: 31.7 GM/DL — LOW (ref 32–36)
MCV RBC AUTO: 93.2 FL — SIGNIFICANT CHANGE UP (ref 80–100)
MONOCYTES # BLD AUTO: 0.58 K/UL — SIGNIFICANT CHANGE UP (ref 0–0.9)
MONOCYTES NFR BLD AUTO: 10.2 % — SIGNIFICANT CHANGE UP (ref 2–14)
NEUTROPHILS # BLD AUTO: 3.27 K/UL — SIGNIFICANT CHANGE UP (ref 1.8–7.4)
NEUTROPHILS NFR BLD AUTO: 57.5 % — SIGNIFICANT CHANGE UP (ref 43–77)
NRBC # BLD: 0 /100 WBCS — SIGNIFICANT CHANGE UP (ref 0–0)
PHOSPHATE SERPL-MCNC: 4.2 MG/DL — SIGNIFICANT CHANGE UP (ref 2.5–4.5)
PLATELET # BLD AUTO: 237 K/UL — SIGNIFICANT CHANGE UP (ref 150–400)
POTASSIUM SERPL-MCNC: 4 MMOL/L — SIGNIFICANT CHANGE UP (ref 3.5–5.3)
POTASSIUM SERPL-SCNC: 4 MMOL/L — SIGNIFICANT CHANGE UP (ref 3.5–5.3)
PROT SERPL-MCNC: 7.5 G/DL — SIGNIFICANT CHANGE UP (ref 6–8.3)
RBC # BLD: 3.69 M/UL — LOW (ref 4.2–5.8)
RBC # FLD: 13.7 % — SIGNIFICANT CHANGE UP (ref 10.3–14.5)
SODIUM SERPL-SCNC: 137 MMOL/L — SIGNIFICANT CHANGE UP (ref 135–145)
WBC # BLD: 5.69 K/UL — SIGNIFICANT CHANGE UP (ref 3.8–10.5)
WBC # FLD AUTO: 5.69 K/UL — SIGNIFICANT CHANGE UP (ref 3.8–10.5)

## 2024-10-14 PROCEDURE — 99232 SBSQ HOSP IP/OBS MODERATE 35: CPT | Mod: GC

## 2024-10-14 PROCEDURE — 99221 1ST HOSP IP/OBS SF/LOW 40: CPT

## 2024-10-14 RX ORDER — HEPARIN SODIUM 10000 [USP'U]/ML
1100 INJECTION INTRAVENOUS; SUBCUTANEOUS
Qty: 25000 | Refills: 0 | Status: DISCONTINUED | OUTPATIENT
Start: 2024-10-14 | End: 2024-10-14

## 2024-10-14 RX ORDER — HEPARIN SODIUM 10000 [USP'U]/ML
11 INJECTION INTRAVENOUS; SUBCUTANEOUS
Qty: 25000 | Refills: 0 | Status: DISCONTINUED | OUTPATIENT
Start: 2024-10-14 | End: 2024-10-14

## 2024-10-14 RX ORDER — HEPARIN SODIUM 10000 [USP'U]/ML
1100 INJECTION INTRAVENOUS; SUBCUTANEOUS
Qty: 25000 | Refills: 0 | Status: DISCONTINUED | OUTPATIENT
Start: 2024-10-14 | End: 2024-10-15

## 2024-10-14 RX ADMIN — Medication 10 MILLIGRAM(S): at 06:22

## 2024-10-14 RX ADMIN — HEPARIN SODIUM 11 UNIT(S)/HR: 10000 INJECTION INTRAVENOUS; SUBCUTANEOUS at 09:44

## 2024-10-14 RX ADMIN — Medication 100 MILLIGRAM(S): at 11:51

## 2024-10-14 RX ADMIN — Medication 25 MILLIGRAM(S): at 06:22

## 2024-10-14 RX ADMIN — Medication 40 MILLIGRAM(S): at 22:27

## 2024-10-14 RX ADMIN — PANTOPRAZOLE SODIUM 40 MILLIGRAM(S): 40 TABLET, DELAYED RELEASE ORAL at 11:51

## 2024-10-14 RX ADMIN — POLYETHYLENE GLYCOL 3350 17 GRAM(S): 17 POWDER, FOR SOLUTION ORAL at 11:51

## 2024-10-14 RX ADMIN — Medication 200 MILLIGRAM(S): at 06:22

## 2024-10-14 RX ADMIN — FLUTICASONE PROPIONATE AND SALMETEROL XINAFOATE 1 DOSE(S): 230; 21 AEROSOL, METERED RESPIRATORY (INHALATION) at 17:28

## 2024-10-14 RX ADMIN — HEPARIN SODIUM 11 UNIT(S)/HR: 10000 INJECTION INTRAVENOUS; SUBCUTANEOUS at 23:26

## 2024-10-14 RX ADMIN — IPRATROPIUM BROMIDE AND ALBUTEROL SULFATE 3 MILLILITER(S): .5; 2.5 SOLUTION RESPIRATORY (INHALATION) at 06:22

## 2024-10-14 RX ADMIN — Medication 25 MILLIGRAM(S): at 17:27

## 2024-10-14 RX ADMIN — HEPARIN SODIUM 11 UNIT(S)/HR: 10000 INJECTION INTRAVENOUS; SUBCUTANEOUS at 18:08

## 2024-10-14 NOTE — PROGRESS NOTE ADULT - ATTENDING COMMENTS
Agree with above. Patient with a few laparoscopic scars in upper abdominal quadrant, he reports possible gastric surgery, ?gastric bypass though he's not reliable. Family does not know prior abdominal surgery history. Would obtain CT A/P to see if patient has hx of gastric bypass before going forward with holding A/C and planning for EGD/PEG.

## 2024-10-14 NOTE — CONSULT NOTE ADULT - SUBJECTIVE AND OBJECTIVE BOX
CHIEF COMPLAINT:    HISTORY OF PRESENT ILLNESS:  Patient is a 69-year-old PMH of HTN, T2DM, ?stroke, atrial flutter, CAD s/p CABG x3 (~2018), and alcohol use disorder who initially presented to [facility] on 9/14 for a mechanical fall after he tripped and fell onto his R shoulder. He was diagnosed with R proximal humerus fracture with planned ORIF with Orthopedic Surgery. Patient's course was c/b alcohol w/d and he was started on CIWA protocol with chlordiazepoxide. Patient experienced acute hypercapnic RF - thought to be d/t benzodiazepine use. Was given flumazenil and was intubated. Transferred to MICU. His ORIF was cancelled d/t CT head with evidence of subacute to chronic SDHs. Extubated 9/19 - with ongoing dysphagia since. He refused PEG placement - NGT in place. On 10/4 - he was found to be hypoxic with CTA chest showing large PE of the R main PA. US of the LEs disclosed multiple b/l DVTs. Transferred to [facility] for further management of this PE. Given history of subacute vs. chronic subdural hematomas, patient underwent CTH on 10/7/2024 - exam degraded by motion at the vertex. Primary team d/w NSGY - they were OK with initiating heparin gtt. Patient now transitioned to apixaban 10mg BID for VTE. Underwent MRI brain on 10/8/2024, given ongoing dysphagia since extubation on 9/19/2024. Punctate infarcts seen in the right cerebellum and left centrum semiovale. Additionally, patient underwent swallow bedside assessment on 10/9 - he has prolonged bolus manipulation, prolonged bolus transport, and there is suspicion for laryngeal penetration/aspiration. Awaiting PEG placement. Cardiology is consulted for cardiac risk stratification.    PAST MEDICAL & SURGICAL HISTORY:  HTN (hypertension)  T2DM (type 2 diabetes mellitus)  CVA (cerebrovascular accident)  Atrial flutter  CAD (coronary artery disease)  History of alcohol use disorder  History of repair of left hip joint      PREVIOUS DIAGNOSTIC TESTING:    [ ] Echocardiogram:  [ ]  Catheterization:  [ ] Stress Test:  	    MEDICATIONS:  aMIOdarone    Tablet 200 milliGRAM(s) Oral daily  amLODIPine   Tablet 10 milliGRAM(s) Oral daily  heparin  Infusion 1100 Unit(s)/Hr IV Continuous <Continuous>  metoprolol tartrate 25 milliGRAM(s) Oral two times a day      albuterol/ipratropium for Nebulization 3 milliLiter(s) Nebulizer every 6 hours PRN  fluticasone propionate/ salmeterol 250-50 MICROgram(s) Diskus 1 Dose(s) Inhalation two times a day      pantoprazole  Injectable 40 milliGRAM(s) IV Push daily  polyethylene glycol 3350 17 Gram(s) Oral daily    atorvastatin 40 milliGRAM(s) Oral at bedtime  dextrose 50% Injectable 12.5 Gram(s) IV Push once  dextrose 50% Injectable 25 Gram(s) IV Push once  dextrose 50% Injectable 25 Gram(s) IV Push once  dextrose Oral Gel 15 Gram(s) Oral once  glucagon  Injectable 1 milliGRAM(s) IntraMuscular once  insulin lispro (ADMELOG) corrective regimen sliding scale   SubCutaneous at bedtime  insulin lispro (ADMELOG) corrective regimen sliding scale   SubCutaneous every 6 hours    dextrose 5%. 1000 milliLiter(s) IV Continuous <Continuous>  dextrose 5%. 1000 milliLiter(s) IV Continuous <Continuous>  thiamine 100 milliGRAM(s) Oral daily      FAMILY HISTORY:      SOCIAL HISTORY:    [ ] Non-smoker  [ ] Smoker  [ ] Alcohol    Allergies    No Known Allergies    Intolerances    	    REVIEW OF SYSTEMS:  CONSTITUTIONAL: No fever, weight loss, or fatigue  EYES: No eye pain, visual disturbances, or discharge  ENMT:  No difficulty hearing, tinnitus, vertigo; No sinus or throat pain  NECK: No pain or stiffness  RESPIRATORY: No cough, wheezing, chills or hemoptysis; No Shortness of Breath  CARDIOVASCULAR: No chest pain, palpitations, passing out, dizziness, or leg swelling  GASTROINTESTINAL: No abdominal or epigastric pain. No nausea, vomiting, or hematemesis; No diarrhea or constipation. No melena or hematochezia.  GENITOURINARY: No dysuria, frequency, hematuria, or incontinence  NEUROLOGICAL: No headaches, memory loss, loss of strength, numbness, or tremors  SKIN: No itching, burning, rashes, or lesions   LYMPH Nodes: No enlarged glands  ENDOCRINE: No heat or cold intolerance; No hair loss  MUSCULOSKELETAL: No joint pain or swelling; No muscle, back, or extremity pain  PSYCHIATRIC: No depression, anxiety, mood swings, or difficulty sleeping  HEME/LYMPH: No easy bruising, or bleeding gums  ALLERY AND IMMUNOLOGIC: No hives or eczema	    [ ] All others negative	  [ ] Unable to obtain    PHYSICAL EXAM:  T(C): 36.7 (10-14-24 @ 05:10), Max: 36.9 (10-14-24 @ 00:01)  HR: 82 (10-14-24 @ 05:10) (69 - 82)  BP: 119/82 (10-14-24 @ 05:10) (115/76 - 124/85)  RR: 18 (10-14-24 @ 05:10) (18 - 18)  SpO2: 94% (10-14-24 @ 05:10) (94% - 96%)  Wt(kg): --  I&O's Summary    13 Oct 2024 07:01  -  14 Oct 2024 07:00  --------------------------------------------------------  IN: 0 mL / OUT: 975 mL / NET: -975 mL    14 Oct 2024 07:01  -  14 Oct 2024 10:25  --------------------------------------------------------  IN: 0 mL / OUT: 100 mL / NET: -100 mL        Appearance: Normal	  HEENT:   Normal oral mucosa, PERRL, EOMI	  Lymphatic: No lymphadenopathy  Cardiovascular: Normal S1 S2, No JVD, No murmurs, No edema  Respiratory: Lungs clear to auscultation	  Psychiatry: A & O x 3, Mood & affect appropriate  Gastrointestinal:  Soft, Non-tender, + BS	  Skin: No rashes, No ecchymoses, No cyanosis	  Neurologic: Non-focal  Extremities: Normal range of motion, No clubbing, cyanosis or edema  Vascular: Peripheral pulses palpable 2+ bilaterally    TELEMETRY: 	    ECG:  	  RADIOLOGY:  OTHER: 	  	  LABS:	 	                        10.9   5.69  )-----------( 237      ( 14 Oct 2024 06:30 )             34.4     10-14    137  |  96  |  13  ----------------------------<  141[H]  4.0   |  28  |  0.71    Ca    10.2      14 Oct 2024 06:26  Phos  4.2     10-14  Mg     2.0     10-14    TPro  7.5  /  Alb  3.8  /  TBili  0.3  /  DBili  x   /  AST  16  /  ALT  15  /  AlkPhos  101  10-14    proBNP:   Lipid Profile:   HgA1c:   TSH:     ASSESSMENT/PLAN:

## 2024-10-14 NOTE — PROGRESS NOTE ADULT - ASSESSMENT
68 yo M with PMH of CAD s/p CABG, CVA, HTN, and T2DM presenting for fall to [facility], found to have alcohol withdrawal with course c/b acute hypercapnic respiratory failure s/p extubation and b/l PEs and DVT on heparin gtt. GI consulted for dysphagia.     Impression:  #Dysphagia    GI consulted for PEG tube placement for oropharyngeal dysphagia. FEES showed uncontrolled spillage of material and pharyngeal stage with repetitive silent aspiration.     - Plan for PEG tube placement tomorrow (pt and sister agreeable and understand risks/alternatives)  - Please obtain CT abdomen noncontrast given ?gastric surgery per pt   - Pt cleared by cardiology for procedure  - Keep patient NPO MN night before procedure  - Hold heparin gtt tomorrow at 6 am  - Maintain NGT in place for feeds    All recommendations are tentative until note is attested by attending.     Venita Proctor, PGY4  Gastroenterology/Hepatology Fellow  Available on Microsoft Teams  [phone] (Long Range Pager)  [phone] (Short Range Pager [facility])    After 5 pm, please contact the on-call GI fellow for any urgent issues via the Hospital Call

## 2024-10-14 NOTE — PROGRESS NOTE ADULT - PROBLEM SELECTOR PLAN 3
- US of b/l LEs at [facility] -> R peroneal vein and L popliteal, tibial trunk, peroneal, and posterior tibial DVTs  > Repeat US of b/l LEs showing mulitple DVTs  > C/w heparin as above

## 2024-10-14 NOTE — PROGRESS NOTE ADULT - SUBJECTIVE AND OBJECTIVE BOX
INTERVAL HPI/OVERNIGHT EVENTS:  Pt seen and examined at bedside. Was refusing nasal cannula last night and was desaturating to the 80s but would come back up without intervention.    Brief Daily Plan:  -    VITAL SIGNS:  T(F): 98 (10-14-24 @ 05:10)  HR: 82 (10-14-24 @ 05:10)  BP: 119/82 (10-14-24 @ 05:10)  RR: 18 (10-14-24 @ 05:10)  SpO2: 94% (10-14-24 @ 05:10)  Wt(kg): --    PHYSICAL EXAM:    CONSTITUTIONAL: NAD, AOx2  RESPIRATORY: Normal respiratory effort; lungs are clear to auscultation bilaterally  CARDIOVASCULAR: Regular rate and rhythm, normal S1 and S2, no murmur/rub/gallop; Peripheral pulses are 2+ bilaterally  ABDOMEN: Nontender to palpation, normoactive bowel sounds, no rebound/guarding  MUSCLOSKELETAL:  Moving all limbs spontaneously; No lower extremity edema  PSYCH: Affect appropriate    MEDICATIONS  (STANDING):  aMIOdarone    Tablet 200 milliGRAM(s) Oral daily  amLODIPine   Tablet 10 milliGRAM(s) Oral daily  atorvastatin 40 milliGRAM(s) Oral at bedtime  dextrose 5%. 1000 milliLiter(s) (50 mL/Hr) IV Continuous <Continuous>  dextrose 5%. 1000 milliLiter(s) (100 mL/Hr) IV Continuous <Continuous>  dextrose 50% Injectable 12.5 Gram(s) IV Push once  dextrose 50% Injectable 25 Gram(s) IV Push once  dextrose 50% Injectable 25 Gram(s) IV Push once  dextrose Oral Gel 15 Gram(s) Oral once  fluticasone propionate/ salmeterol 250-50 MICROgram(s) Diskus 1 Dose(s) Inhalation two times a day  glucagon  Injectable 1 milliGRAM(s) IntraMuscular once  heparin  Infusion 1400 Unit(s)/Hr (10 mL/Hr) IV Continuous <Continuous>  insulin lispro (ADMELOG) corrective regimen sliding scale   SubCutaneous at bedtime  insulin lispro (ADMELOG) corrective regimen sliding scale   SubCutaneous every 6 hours  metoprolol tartrate 25 milliGRAM(s) Oral two times a day  pantoprazole  Injectable 40 milliGRAM(s) IV Push daily  polyethylene glycol 3350 17 Gram(s) Oral daily  thiamine 100 milliGRAM(s) Oral daily    MEDICATIONS  (PRN):  albuterol/ipratropium for Nebulization 3 milliLiter(s) Nebulizer every 6 hours PRN Shortness of Breath and/or Wheezing      Allergies    No Known Allergies    Intolerances        LABS:                        10.4   7.14  )-----------( 249      ( 13 Oct 2024 06:53 )             32.5     10-13    138  |  96  |  12  ----------------------------<  122[H]  4.1   |  29  |  0.74    Ca    10.0      13 Oct 2024 06:55  Phos  4.5     10-13  Mg     2.0     10-13    TPro  7.3  /  Alb  3.6  /  TBili  0.3  /  DBili  x   /  AST  19  /  ALT  14  /  AlkPhos  97  10-13    PTT - ( 13 Oct 2024 10:09 )  PTT:70.3 sec  Urinalysis Basic - ( 13 Oct 2024 06:55 )    Color: x / Appearance: x / SG: x / pH: x  Gluc: 122 mg/dL / Ketone: x  / Bili: x / Urobili: x   Blood: x / Protein: x / Nitrite: x   Leuk Esterase: x / RBC: x / WBC x   Sq Epi: x / Non Sq Epi: x / Bacteria: x        RADIOLOGY & ADDITIONAL TESTS:  Reviewed  ******************  Authored By: Henok Nava MD, PGY1  MS Teams Preferred  ******************   INTERVAL HPI/OVERNIGHT EVENTS:  Pt seen and examined at bedside. Was refusing nasal cannula last night and was desaturating to the 80s but would come back up without intervention. Per RN, maximum NC requirement 2L but not consistently on, now off NC. BM x1. Patient states he is feeling "frustrated" and wants to go home. Unable to articulate why he requires inpatient care, discussed w/ patient plan for PEG placement and ROSALBA.    Brief Daily Plan:  -    VITAL SIGNS:  T(F): 98 (10-14-24 @ 05:10)  HR: 82 (10-14-24 @ 05:10)  BP: 119/82 (10-14-24 @ 05:10)  RR: 18 (10-14-24 @ 05:10)  SpO2: 94% (10-14-24 @ 05:10)  Wt(kg): --    PHYSICAL EXAM:    CONSTITUTIONAL: NAD, AOx2 (states year is 2004 but identifies president as Cynthia)  RESPIRATORY: Normal respiratory effort; lungs are clear to auscultation bilaterally  CARDIOVASCULAR: Regular rate and rhythm, normal S1 and S2, +systolic murmur; Peripheral pulses are 2+ bilaterally  ABDOMEN: Nontender to palpation, normoactive bowel sounds, no rebound/guarding  MUSCLOSKELETAL:  Moving limbs spontaneously; RUE in sling; No lower extremity edema  PSYCH: Affect appropriate    MEDICATIONS  (STANDING):  aMIOdarone    Tablet 200 milliGRAM(s) Oral daily  amLODIPine   Tablet 10 milliGRAM(s) Oral daily  atorvastatin 40 milliGRAM(s) Oral at bedtime  dextrose 5%. 1000 milliLiter(s) (50 mL/Hr) IV Continuous <Continuous>  dextrose 5%. 1000 milliLiter(s) (100 mL/Hr) IV Continuous <Continuous>  dextrose 50% Injectable 12.5 Gram(s) IV Push once  dextrose 50% Injectable 25 Gram(s) IV Push once  dextrose 50% Injectable 25 Gram(s) IV Push once  dextrose Oral Gel 15 Gram(s) Oral once  fluticasone propionate/ salmeterol 250-50 MICROgram(s) Diskus 1 Dose(s) Inhalation two times a day  glucagon  Injectable 1 milliGRAM(s) IntraMuscular once  heparin  Infusion 1400 Unit(s)/Hr (10 mL/Hr) IV Continuous <Continuous>  insulin lispro (ADMELOG) corrective regimen sliding scale   SubCutaneous at bedtime  insulin lispro (ADMELOG) corrective regimen sliding scale   SubCutaneous every 6 hours  metoprolol tartrate 25 milliGRAM(s) Oral two times a day  pantoprazole  Injectable 40 milliGRAM(s) IV Push daily  polyethylene glycol 3350 17 Gram(s) Oral daily  thiamine 100 milliGRAM(s) Oral daily    MEDICATIONS  (PRN):  albuterol/ipratropium for Nebulization 3 milliLiter(s) Nebulizer every 6 hours PRN Shortness of Breath and/or Wheezing      Allergies    No Known Allergies    Intolerances        LABS:                        10.4   7.14  )-----------( 249      ( 13 Oct 2024 06:53 )             32.5     10-13    138  |  96  |  12  ----------------------------<  122[H]  4.1   |  29  |  0.74    Ca    10.0      13 Oct 2024 06:55  Phos  4.5     10-13  Mg     2.0     10-13    TPro  7.3  /  Alb  3.6  /  TBili  0.3  /  DBili  x   /  AST  19  /  ALT  14  /  AlkPhos  97  10-13    PTT - ( 13 Oct 2024 10:09 )  PTT:70.3 sec  Urinalysis Basic - ( 13 Oct 2024 06:55 )    Color: x / Appearance: x / SG: x / pH: x  Gluc: 122 mg/dL / Ketone: x  / Bili: x / Urobili: x   Blood: x / Protein: x / Nitrite: x   Leuk Esterase: x / RBC: x / WBC x   Sq Epi: x / Non Sq Epi: x / Bacteria: x        RADIOLOGY & ADDITIONAL TESTS:  Reviewed  ******************  Authored By: Henok Nava MD, PGY1  MS Teams Preferred  ******************

## 2024-10-14 NOTE — CONSULT NOTE ADULT - TIME BILLING
cardiac clearance  PEG placement   history/lab/test review
education, assessment and coordination of care, separate from time spend with fellow.
-review of the history and records  -general and neurological examination  -generation of assessment and treatment plan  -communication with the patient/family members  -coordination of care.

## 2024-10-14 NOTE — PROGRESS NOTE ADULT - PROBLEM SELECTOR PLAN 8
Under control  Continue current medication  We will re-evaluate at next office visit  10/8 MRI brain: Small acute/subacute infarcts within the right cerebellar   hemisphere and left posterior centrum semiovale with associated cytotoxic   edema  -Neuro consulted  - statin 40  - target LDL <70, SBP <130  - start thiamine  -f/u neuro outpt

## 2024-10-14 NOTE — PROGRESS NOTE ADULT - PROBLEM SELECTOR PLAN 4
- Initially presented to [facility] on 9/14 for a mechanical fall after he tripped and fell onto his R shoulder  - XR showing R proximal humerus fracture, ORIF with Orthopedic Surgery at [facility] cancelled due to MICU course  > Orthopedic surgery on 10/7 said do outpt surgery.

## 2024-10-14 NOTE — PROGRESS NOTE ADULT - PROBLEM SELECTOR PLAN 1
Per mom, infant latching and nursing well. Breastfeeding basics and log sheet discussed. Will page for feeds. [address] Bardales and nursing well. Cassie [phone] latched and nursing well. - Hospital course at [facility] c/b AHRF  - CTA chest (10/4) -> Large PE of R main pulmonary artery  - US of b/l LEs -> R peroneal vein and L popliteal, tibial trunk, peroneal, and posterior tibial DVTs  - EKG showing evidence of right heart strain, S1Q3T3 changes, T wave depressions in precordial leads; not present on EKG from 9/15  - TTE (9/15) unremarkable  - Troponin 92, BNP 7168 on transfer  > CTH not acutely concerning  > C/w heparin gtt w/ target PTT-> 0.4-0.6 Repeat TTE not concerning  > Vascular cardiology consulted rec heparin for now and eventual transition to DOAC  - Neurosurg consulted for AC management, they said okay to PTT 60-80  - Transitioned to DOAC loading eliquis but transitioned back to heparin pending PEG placement (need to stop 6 hours before procedure and obtain cardiac clearance on Monday) - Hospital course at [facility] c/b AHRF  - CTA chest (10/4) -> Large PE of R main pulmonary artery  - US of b/l LEs -> R peroneal vein and L popliteal, tibial trunk, peroneal, and posterior tibial DVTs  - EKG showing evidence of right heart strain, S1Q3T3 changes, T wave depressions in precordial leads; not present on EKG from 9/15  - TTE (9/15) unremarkable  - Troponin 92, BNP 7168 on transfer  > CTH not acutely concerning  > C/w heparin gtt w/ target PTT-> 0.4-0.6 Repeat TTE not concerning  > Vascular cardiology consulted rec heparin for now and eventual transition to DOAC  - Neurosurg consulted for AC management, they said okay to PTT 60-80  - Transitioned to DOAC loading eliquis but transitioned back to heparin pending PEG placement (need to stop 6 hours before procedure and obtain cardiac clearance on Monday)'  - heparin gtt 14 --> 10 --> 11 ml/hr for aPTT 53.8 (goal 60-80)

## 2024-10-14 NOTE — PROGRESS NOTE ADULT - SUBJECTIVE AND OBJECTIVE BOX
Interval Events:   - Pt breathing comfortably on room air  - Receiving heparin gtt    ROS:   12 point review of systems performed and negative except otherwise noted in HPI.    Hospital Medications:  albuterol/ipratropium for Nebulization 3 milliLiter(s) Nebulizer every 6 hours PRN  aMIOdarone    Tablet 200 milliGRAM(s) Oral daily  amLODIPine   Tablet 10 milliGRAM(s) Oral daily  atorvastatin 40 milliGRAM(s) Oral at bedtime  dextrose 5%. 1000 milliLiter(s) IV Continuous <Continuous>  dextrose 5%. 1000 milliLiter(s) IV Continuous <Continuous>  dextrose 50% Injectable 12.5 Gram(s) IV Push once  dextrose 50% Injectable 25 Gram(s) IV Push once  dextrose 50% Injectable 25 Gram(s) IV Push once  dextrose Oral Gel 15 Gram(s) Oral once  fluticasone propionate/ salmeterol 250-50 MICROgram(s) Diskus 1 Dose(s) Inhalation two times a day  glucagon  Injectable 1 milliGRAM(s) IntraMuscular once  heparin  Infusion 1100 Unit(s)/Hr IV Continuous <Continuous>  insulin lispro (ADMELOG) corrective regimen sliding scale   SubCutaneous at bedtime  insulin lispro (ADMELOG) corrective regimen sliding scale   SubCutaneous every 6 hours  metoprolol tartrate 25 milliGRAM(s) Oral two times a day  pantoprazole  Injectable 40 milliGRAM(s) IV Push daily  polyethylene glycol 3350 17 Gram(s) Oral daily  thiamine 100 milliGRAM(s) Oral daily      PHYSICAL EXAM:   Vital Signs:  Vital Signs Last 24 Hrs  T(C): 36.5 (14 Oct 2024 11:12), Max: 36.9 (14 Oct 2024 00:01)  T(F): 97.7 (14 Oct 2024 11:12), Max: 98.4 (14 Oct 2024 00:01)  HR: 76 (14 Oct 2024 11:12) (69 - 82)  BP: 116/77 (14 Oct 2024 11:12) (115/76 - 124/85)  BP(mean): --  RR: 18 (14 Oct 2024 11:12) (18 - 18)  SpO2: 95% (14 Oct 2024 11:12) (94% - 96%)    Parameters below as of 14 Oct 2024 11:12  Patient On (Oxygen Delivery Method): room air      Daily     Daily     GENERAL:  No acute distress, dysphonic, on 2 L NC  HEENT:  Normocephalic/atraumatic, no scleral icterus  CHEST:  No accessory muscle use  HEART:  Regular rate and rhythm  ABDOMEN:  Soft, non-tender, non-distended, normoactive bowel sounds,  no surgical scar  EXTREMITIES: No cyanosis, clubbing, or edema  SKIN:  No rash  NEURO:  Alert and oriented x 2, no asterixis    LABS: reviewed                        10.9   5.69  )-----------( 237      ( 14 Oct 2024 06:30 )             34.4     10-14    137  |  96  |  13  ----------------------------<  141[H]  4.0   |  28  |  0.71    Ca    10.2      14 Oct 2024 06:26  Phos  4.2     10-14  Mg     2.0     10-14    TPro  7.5  /  Alb  3.8  /  TBili  0.3  /  DBili  x   /  AST  16  /  ALT  15  /  AlkPhos  101  10-14    LIVER FUNCTIONS - ( 14 Oct 2024 06:26 )  Alb: 3.8 g/dL / Pro: 7.5 g/dL / ALK PHOS: 101 U/L / ALT: 15 U/L / AST: 16 U/L / GGT: x             Interval Diagnostic Studies: see sunrise for full report   Interval Events:   - Pt breathing comfortably on room air  - Receiving heparin gtt    ROS:   12 point review of systems performed and negative except otherwise noted in HPI.    Hospital Medications:  albuterol/ipratropium for Nebulization 3 milliLiter(s) Nebulizer every 6 hours PRN  aMIOdarone    Tablet 200 milliGRAM(s) Oral daily  amLODIPine   Tablet 10 milliGRAM(s) Oral daily  atorvastatin 40 milliGRAM(s) Oral at bedtime  dextrose 5%. 1000 milliLiter(s) IV Continuous <Continuous>  dextrose 5%. 1000 milliLiter(s) IV Continuous <Continuous>  dextrose 50% Injectable 12.5 Gram(s) IV Push once  dextrose 50% Injectable 25 Gram(s) IV Push once  dextrose 50% Injectable 25 Gram(s) IV Push once  dextrose Oral Gel 15 Gram(s) Oral once  fluticasone propionate/ salmeterol 250-50 MICROgram(s) Diskus 1 Dose(s) Inhalation two times a day  glucagon  Injectable 1 milliGRAM(s) IntraMuscular once  heparin  Infusion 1100 Unit(s)/Hr IV Continuous <Continuous>  insulin lispro (ADMELOG) corrective regimen sliding scale   SubCutaneous at bedtime  insulin lispro (ADMELOG) corrective regimen sliding scale   SubCutaneous every 6 hours  metoprolol tartrate 25 milliGRAM(s) Oral two times a day  pantoprazole  Injectable 40 milliGRAM(s) IV Push daily  polyethylene glycol 3350 17 Gram(s) Oral daily  thiamine 100 milliGRAM(s) Oral daily      PHYSICAL EXAM:   Vital Signs:  Vital Signs Last 24 Hrs  T(C): 36.5 (14 Oct 2024 11:12), Max: 36.9 (14 Oct 2024 00:01)  T(F): 97.7 (14 Oct 2024 11:12), Max: 98.4 (14 Oct 2024 00:01)  HR: 76 (14 Oct 2024 11:12) (69 - 82)  BP: 116/77 (14 Oct 2024 11:12) (115/76 - 124/85)  BP(mean): --  RR: 18 (14 Oct 2024 11:12) (18 - 18)  SpO2: 95% (14 Oct 2024 11:12) (94% - 96%)    Parameters below as of 14 Oct 2024 11:12  Patient On (Oxygen Delivery Method): room air      Daily     Daily     GENERAL:  No acute distress, dysphonic, on 2 L NC  HEENT:  Normocephalic/atraumatic, no scleral icterus  CHEST:  No accessory muscle use  HEART:  Regular rate and rhythm  ABDOMEN:  Soft, non-tender, non-distended, normoactive bowel sounds,  +laparoscopic surgical scar  EXTREMITIES: No cyanosis, clubbing, or edema  SKIN:  No rash  NEURO:  Alert and oriented x 2, no asterixis    LABS: reviewed                        10.9   5.69  )-----------( 237      ( 14 Oct 2024 06:30 )             34.4     10-14    137  |  96  |  13  ----------------------------<  141[H]  4.0   |  28  |  0.71    Ca    10.2      14 Oct 2024 06:26  Phos  4.2     10-14  Mg     2.0     10-14    TPro  7.5  /  Alb  3.8  /  TBili  0.3  /  DBili  x   /  AST  16  /  ALT  15  /  AlkPhos  101  10-14    LIVER FUNCTIONS - ( 14 Oct 2024 06:26 )  Alb: 3.8 g/dL / Pro: 7.5 g/dL / ALK PHOS: 101 U/L / ALT: 15 U/L / AST: 16 U/L / GGT: x             Interval Diagnostic Studies: see sunrise for full report

## 2024-10-14 NOTE — CONSULT NOTE ADULT - ASSESSMENT
Patient is a 69-year-old PMH of HTN, T2DM, ?stroke, atrial flutter, CAD s/p CABG x3 (~2018), and alcohol use disorder who initially presented to [facility] on 9/14 for a mechanical fall after he tripped and fell onto his R shoulder. He was diagnosed with R proximal humerus fracture with planned ORIF with Orthopedic Surgery. Patient's course was c/b alcohol w/d and he was started on CIWA protocol with chlordiazepoxide. Patient experienced acute hypercapnic RF - thought to be d/t benzodiazepine use. Was given flumazenil and was intubated. Transferred to MICU. His ORIF was cancelled d/t CT head with evidence of subacute to chronic SDHs. Extubated 9/19 - with ongoing dysphagia since. He refused PEG placement - NGT in place. On 10/4 - he was found to be hypoxic with CTA chest showing large PE of the R main PA. US of the LEs disclosed multiple b/l DVTs. Transferred to [facility] for further management of this PE. Given history of subacute vs. chronic subdural hematomas, patient underwent CTH on 10/7/2024 - exam degraded by motion at the vertex. Primary team d/w NSGY - they were OK with initiating heparin gtt. Patient now transitioned to apixaban 10mg BID for VTE. Underwent MRI brain on 10/8/2024, given ongoing dysphagia since extubation on 9/19/2024. Punctate infarcts seen in the right cerebellum and left centrum semiovale. Additionally, patient underwent swallow bedside assessment on 10/9 - he has prolonged bolus manipulation, prolonged bolus transport, and there is suspicion for laryngeal penetration/aspiration. Awaiting PEG placement. Cardiology is consulted for cardiac risk stratification.    - pt is hemodynamically stable with no signs/symptoms of ADHF - left or right sided  - TTE reviewed - preserved LV function; bioprosthetic valve with no issues: RV mild enlargement and dysfunction  - RCRI ~ 2 with TTE as above, no need to pursue any further cardiac work up prior to the PEG  - monitor fluids carefully in setting of the the mild RV dysfunction   - c/w the current medical management  - patient is at acceptable risk for the PEG Placement

## 2024-10-14 NOTE — PROGRESS NOTE ADULT - PROBLEM SELECTOR PLAN 2
- Hospital course at [facility] c/b AHRF  - S/p intubation on 9/18 for likely benzodiazepine overdose, extubated on 9/19  - Diffuse rhonchi with copious secretions on exam  - CTA chest (10/4) -> Large PE of R main pulmonary artery, scattered GGOs and interstitial changes predominantly in R and L lower lobes  - Likely PE +/- superimposed aspiration PNA  > C/w management of PE, as above  >Empiric Zosyn for 7d (10/5-10/12)  - On RA Hospital course at [facility] c/b AHRF s/p intubation on 9/18 for likely benzodiazepine overdose, extubated on 9/19. Diffuse rhonchi with copious secretions on exam. CTA chest (10/4) -> Large PE of R main pulmonary artery, scattered GGOs and interstitial changes predominantly in R and L lower lobes. Likely PE +/- superimposed aspiration PNA.    - C/w management of PE, as above  - s/p empiric Zosyn for 7d (10/5-10/12)  - On RA

## 2024-10-14 NOTE — PROGRESS NOTE ADULT - ATTENDING COMMENTS
69 year old male, with PMH of HTN, DM2, ?CVA/TIA, Aflutter, CAD s/p CABG (2018), EtOH use disorder (two 40oz beers daily), current smoker (8 cigs/day), L total hip arthroplasty, initially admitted to [facility] (9/14/24-10/4/24) after mech fall c/b R prox humeral fx and EtOH withdrawal. Was on CIWA with Librium/Ativan PRN. TTE on 9/15/24 showed LVEF 60-65%, grad I DD, and an aortic valve prosthesis with normal function. Course c/b AMS, acute hypoxemic/hypercapnic respiratory, and agonal breathing on 9/18/24. Was initially given flumazenil x2 with some improvement, but was intubated for airway protection given excessive secretions and transferred to MICU 9/18/24. CTA chest was somewhat limited study but did not find PE at the time, noted RUL GGOs "suspicious for atypical or viral infection." CTH showed subacute and chronic SDHs as well as chronic lacunar infarct within L lentiform nucleus. Repeat CT showed stable SDHs. Extubated on 9/19/24. Pt was downgraded to Medicine floors on 9/21/24. Noted to have loss of voice and dysphagia. Evaluated by S+S, found dysphagia on video flouroscopy to all textures and recommended for PEG tube, but pt refused and opted for NGT placement. Ortho surgery that was planned for humeral fx was cancelled and ultimately recommended for conservative management only. CXR on 10/3/24 showed new LLL infiltrate, started on Zosyn. On 10/4/24, pt desatted on supplemental O2, CTA chest found massive PE in R main bronchus with pulm trunk dilatation. BLE duplex also found b/l LE DVTs (L>R). Started on hep gtt and transferred to [facility] for embolectomy evaluation.      #Acute hypoxemic respiratory failure  #R main bronchus PE, b/l LE DVTs  #?PNA  #CVA  #Dysphagia  #R humeral fx  #Chronic aflutter  #DM2  #EtOH/tobacco use disorder    - on RA  - completed 7 day course of zosyn   - c/w amio and metoprolol (was on at OSH)  - c/w advair BID and duonebs q6h PRN  - c/w low ISS for now, monitor FS  - monitor on telemetry  - MRI head showing acute/subacute infarcts; neuro recs appreciated; f/u MG labs   - statin, thiamine  - TTE no acute findings   - speech and swallow, NPO, FEES -> did not pass study   - transitioned to eliquis 10mg BID for 7 days (including heparin drip days); now back on heparin drip for planned PEG tube placement; when ready to switch back to eliquis, will be on 5mg BID  - ortho - outpatient f/u   - started on NG feeds 10/5 but patient pulled out NGT 10/6 overnight; ENT replaced NGT on 10/7. Resume NGT feeds; dietitian recs noted  - plan for PEG placement tuesday, pending CT abd to eval for prior abdominal surgery findings; NPO after MN and hold heparin drip at 6am if no issues    - cardio recs appreciated; no further testing needed for PEG procedure    - eventual plan for ROSALBA     Rest as above. Discussed with HS.

## 2024-10-14 NOTE — PROGRESS NOTE ADULT - ASSESSMENT
69-year-old PMH of HTN, T2DM, ?stroke, atrial flutter, CAD s/p CABG x3 (~2018), and alcohol use disorder who initially presented to [facility] on 9/14 for a mechanical fall after he tripped and fell onto his R shoulder. He was diagnosed with R proximal humerus fracture with planned ORIF with Orthopedic Surgery. Patient's course was c/b alcohol w/d and he was started on CIWA protocol with chlordiazepoxide. Patient experienced acute hypercapnic RF - thought to be d/t benzodiazepine use. Was given flumazenil and was intubated. Transferred to MICU. His ORIF was cancelled d/t CT head with evidence of subacute to chronic SDHs. Extubated 9/19 - with ongoing dysphagia since. He refused PEG placement - NGT in place. On 10/4 - he was found to be hypoxic with CTA chest showing large PE of the R main PA. US of the LEs disclosed multiple b/l DVTs. Transferred to [facility] for further management of this PE. Given history of subacute vs. chronic subdural hematomas, patient underwent CTH on 10/7/2024 - exam degraded by motion at the vertex. Primary team d/w NSGY - they were OK with initiating heparin gtt. Patient now transitioned to apixaban 10mg BID for VTE. Underwent MRI brain on 10/8/2024, given ongoing dysphagia since extubation on 9/19/2024. Punctate infarcts seen in the right cerebellum and left centrum semiovale. Additionally, patient underwent swallow bedside assessment on 10/9 - he has prolonged bolus manipulation, prolonged bolus transport, and there is suspicion for laryngeal penetration/aspiration. Due for FEES. Vascular Neurology is consulted for MRI findings.    Recent vitals notable for: afebrile, HR 70s-80s, BP to 149/80, RR 18, SpO2 94-98% on 4L O2 via NC. Labs notable for: Hg 9.6, potassium 3.4, glucose 136, A1c 6.1. MRI brain w/wo with punctate acute/subacute infarcts in the R cerebellum and left centrum semiovale, possible chronic infarct in the left basal ganglia, and white matter hyperintensities of presumed vascular origin. Exam notable for chronically ill-appearing man with NGT in place, RUE immobilized in sling, there is no difficulty with prolonged upgaze, no eyelid ptosis, facial strength intact without asymmetry b/l - no eyelid closure weakness, no air escape when puffed cheeks are pushed in, +hypophonia, no drift in his extremities (other than RUE which has a proximal humeral fracture).  NIHSS: 4 (+1 questions, +3 RUE)  LKN: Unknown  pre-MRS: estimated at least 1  MRI brain w/wo with punctate acute/subacute infarcts in the R cerebellum and left centrum semiovale, possible chronic infarct in the left basal ganglia, and white matter hyperintensities  A1c 6.1 (10/5/2024)  TTE (10/5/2024): LVEF 60-65%, no RWMA, normal left atrium, intact interatrial septum      Impression:   1) Incidental infarcts in the right cerebellum and left centrum semiovale. Mechanism cardioembolic d/t atrial flutter.  2) Isolated dysphagia/dysphonia - MRI brain w/wo unrevealing with regard to etiology. Exam is not c/w myasthenia gravis.  3) mechanical fall   4) h/o SDH   5) ETOH abuse     Recommendations:  - Dysphagia screen already performed - on tube feeds via NGT;  now peg planning 10/15   - SBP goal - OK for normotension from neurovascular perspective (BP goal <130/80)  - Continue apixaban for lifelong anticoagulation for atrial flutter - currently on loading dose 10mg BID for VTE, he can continue 5mg BID for secondary stroke prevention when dose is reduced  - Regarding initiation of statin -  would start atorvastatin 40mg QHS if no objection, target LDL <70 for secondary stroke prevention  - CT angiogram of the head/neck w/IV contrast can be performed on a nonurgent basis to complete stroke workup but unlikely to change stroke management in this patient  - Vitals ordered q4h per primary, likely does not need routine neurochecks at this time (from neurovascular perspective) given infarcts are asymptomatic  - Fall and aspiration precautions  - PT/OT  - DVT prophylaxis: therapeutic AC  - Stroke education provided  - Smoking cessation education provided  - Please document MRS on discharge  - for ETOH h/o would c/w thimiamine.  folic acid   - Regarding dysphagia/dysphonia:  Needs ongoing multidisciplinary workup. F/u SLP recommendations. Consider ENT and GI evaluations; from neuro standpoint no etiology for dysphagia.  Can obtain myasthenia gravis labs although very low clinical suspicion: acetylcholine blocking antibody, acetylcholine modulating antibody, acetylcholine receptor binding antibody, LRP4 autoantibody test, MuSK antibody test  - If no other explanation is found for symptoms: follow up outpatient with Neurology for further evaluation including possible EMG  - Upon discharge, he should follow up with Dr. Akbar Elena (Vascular Neurology): [address], [place] [address]. [phone].    Akbar Elena MD  Vascular Neurology  Office: [phone] .

## 2024-10-14 NOTE — PROGRESS NOTE ADULT - SUBJECTIVE AND OBJECTIVE BOX
Neurology Progress Note    S: Patient seen and examined. No new events overnight.     Medication:    Medications: MEDICATIONS  (STANDING):  aMIOdarone    Tablet 200 milliGRAM(s) Oral daily  amLODIPine   Tablet 10 milliGRAM(s) Oral daily  atorvastatin 40 milliGRAM(s) Oral at bedtime  dextrose 5%. 1000 milliLiter(s) (50 mL/Hr) IV Continuous <Continuous>  dextrose 5%. 1000 milliLiter(s) (100 mL/Hr) IV Continuous <Continuous>  dextrose 50% Injectable 25 Gram(s) IV Push once  dextrose 50% Injectable 12.5 Gram(s) IV Push once  dextrose 50% Injectable 25 Gram(s) IV Push once  dextrose Oral Gel 15 Gram(s) Oral once  fluticasone propionate/ salmeterol 250-50 MICROgram(s) Diskus 1 Dose(s) Inhalation two times a day  glucagon  Injectable 1 milliGRAM(s) IntraMuscular once  heparin  Infusion 1100 Unit(s)/Hr (11 mL/Hr) IV Continuous <Continuous>  insulin lispro (ADMELOG) corrective regimen sliding scale   SubCutaneous at bedtime  insulin lispro (ADMELOG) corrective regimen sliding scale   SubCutaneous every 6 hours  metoprolol tartrate 25 milliGRAM(s) Oral two times a day  pantoprazole  Injectable 40 milliGRAM(s) IV Push daily  polyethylene glycol 3350 17 Gram(s) Oral daily  thiamine 100 milliGRAM(s) Oral daily    MEDICATIONS  (PRN):  albuterol/ipratropium for Nebulization 3 milliLiter(s) Nebulizer every 6 hours PRN Shortness of Breath and/or Wheezing       Vitals:  Vital Signs Last 24 Hrs  T(C): 36.7 (14 Oct 2024 05:10), Max: 36.9 (14 Oct 2024 00:01)  T(F): 98 (14 Oct 2024 05:10), Max: 98.4 (14 Oct 2024 00:01)  HR: 82 (14 Oct 2024 05:10) (69 - 82)  BP: 119/82 (14 Oct 2024 05:10) (115/76 - 124/85)  BP(mean): --  RR: 18 (14 Oct 2024 05:10) (18 - 18)  SpO2: 94% (14 Oct 2024 05:10) (94% - 96%)    Parameters below as of 14 Oct 2024 05:10  Patient On (Oxygen Delivery Method): room air            General Exam:   General Appearance: Appropriately dressed and in no acute distress       Head: Normocephalic, atraumatic and no dysmorphic features  Ear, Nose, and Throat: Moist mucous membranes +NGT   CVS: S1S2+  Resp: No SOB, no wheeze or rhonchi  Abd: soft NTND  Extremities: No edema, no cyanosis  Skin: No bruises, no rashes     Neurological Exam:    - Awake, Alert  - Oriented to: person/age. Knows he is in the hospital, but not which. Does not know month/year--> better AAOx2 now   - Speech: fluent  - Repetition and naming: intact   - Follows simple commands. Performs cross command incorrectly - does not cross.  - Fund of knowledge: knows current president    Cranial nerves - PERRL, BTT b/l, EOMI - there is no difficulty with prolonged upgaze, no eyelid ptosis, face sensation (V1-V3) intact b/l, facial strength intact without asymmetry b/l - no eyelid closure weakness, no air escape when puffed cheeks are pushed in, hearing grossly intact, palate with symmetric elevation, shoulder shrug likely slightly reduced R side d/t orthopedic limitation, tongue midline on protrusion with full lateral movement  Dysarthria: not clearly dysarthric but extremely hypophonic    Motor -  No drift in the LUE x at least 10 seconds  He can move his RUE distally, but cannot lift it AG - orthopedic limitation d/t R proximal humerus fracture  LLE/RLE - no drift x at least 5 seconds b/l    Sensation - Light touch intact throughout    No ankle clonus    Coordination - FNF intact LUE (cannot perform RUE d/t orthopedic limitation), HTS intact RLE, cannot perform LLE but suspect d/t orthopedic limitation - toe to finger intact b/l.    Gait and station - Unable to assess d/t fall risk/safety concerns.      I personally reviewed the below data/images/labs:        LABS:                          10.9 5.69  )-----------( 237      ( 14 Oct 2024 06:30 )             34.4     10-14    137  |  96  |  13  ----------------------------<  141[H]  4.0   |  28  |  0.71    Ca    10.2      14 Oct 2024 06:26  Phos  4.2     10-14  Mg     2.0     10-14    TPro  7.5  /  Alb  3.8  /  TBili  0.3  /  DBili  x   /  AST  16  /  ALT  15  /  AlkPhos  101  10-14    LIVER FUNCTIONS - ( 14 Oct 2024 06:26 )  Alb: 3.8 g/dL / Pro: 7.5 g/dL / ALK PHOS: 101 U/L / ALT: 15 U/L / AST: 16 U/L / GGT: x           PTT - ( 14 Oct 2024 07:56 )  PTT:53.8 sec  Urinalysis Basic - ( 14 Oct 2024 06:26 )    Color: x / Appearance: x / SG: x / pH: x  Gluc: 141 mg/dL / Ketone: x  / Bili: x / Urobili: x   Blood: x / Protein: x / Nitrite: x   Leuk Esterase: x / RBC: x / WBC x   Sq Epi: x / Non Sq Epi: x / Bacteria: x            :  MR BRAIN WAW IC    PROCEDURE DATE:  10/08/2024      INTERPRETATION:  .    CLINICAL INFORMATION: Dysphagia. Subacute on chronic subdural hematomas   on outside head CT.    TECHNIQUE: Multiplanar multisequential MRI of the brain was acquired with   and without the administration of IV gadolinium. 7.5 cc's of IV Gadavist   was administered for the purposes of this examination. 0 cc were   discarded.    COMPARISON: Prior CT study of the head from 10/7/2024. Outside CT study   of the head dated 9/19/2024.    FINDINGS: There is a small focus of diffusion restriction within the   right cerebellar hemisphere (series 5, image 38). An additional small   focus of diffusion reduction is seen within the left posterior centrum   semiovale (series 5, image 54). Corresponding areas of T2/FLAIR   hyperintense signal change are seen in these areas.    A small area of encephalomalacia and gliosis is seen within the left   basal ganglia.    Multiple additional patchy confluent nonspecific T2/FLAIR hyperintense   signal changes are noted throughout the bihemispheric white matter   without associated mass effect or restricted diffusion.    There is no abnormal brain parenchymal or leptomeningeal enhancement.    No hydrocephalus is seen. There are no abnormal extra-axial fluid   collections. Flow-voids are noted throughout the major intracranial   vessels, on the T2 weighted images, consistent with their patency. The   sella turcica and posterior fossa are unremarkable.    Mild scattered mucosal thickening is seen throughout the paranasal   sinuses. The bilateral tympanomastoid cavities are clear. The calvarium   appears intact. The orbits appear unremarkable apart from a disconjugate   gaze.    IMPRESSION: Small acute/subacute infarcts within the right cerebellar   hemisphere and left posterior centrum semiovale with associated cytotoxic   edema.    No acute intracranial hemorrhage.    Multiple patchy confluent nonspecific abnormal white matter foci of   T2/FLAIR prolongation statistically favoring microvascular type changes.    Additional small area of encephalomalacia and gliosis within the left   basal ganglia which may related to chronic infarction and/or hemorrhage.

## 2024-10-14 NOTE — PROGRESS NOTE ADULT - ASSESSMENT
Mr. Jarrett Overton is a 69-year-old w/ PMH of HTN, T2DM, CVA, A. flutter, CAD s/p CABG x3 (~2018), and alcohol use disorder who initially presented to [facility] on 9/14 for a mechanical fall. On 10/4, patient was found to be hypoxic with subsequent CTA chest showing large PE of R main pulmonary artery. US of b/l LEs was further significant for b/l DVTs in the R peroneal vein and L popliteal, tibial trunk, peroneal, and posterior tibial veins. Patient was transferred to [facility] for further management of PE and embolectomy evaluation.

## 2024-10-15 LAB
ACHR BLOCK AB SER-ACNC: 19 % — SIGNIFICANT CHANGE UP (ref 0–25)
ACHR MOD AB SER-ACNC: 0 % — SIGNIFICANT CHANGE UP (ref 0–45)
ALBUMIN SERPL ELPH-MCNC: 4.1 G/DL — SIGNIFICANT CHANGE UP (ref 3.3–5)
ALP SERPL-CCNC: 101 U/L — SIGNIFICANT CHANGE UP (ref 40–120)
ALT FLD-CCNC: 18 U/L — SIGNIFICANT CHANGE UP (ref 10–45)
ANION GAP SERPL CALC-SCNC: 11 MMOL/L — SIGNIFICANT CHANGE UP (ref 5–17)
ANION GAP SERPL CALC-SCNC: 13 MMOL/L — SIGNIFICANT CHANGE UP (ref 5–17)
APTT BLD: 60.9 SEC — HIGH (ref 24.5–35.6)
APTT BLD: 72.1 SEC — HIGH (ref 24.5–35.6)
APTT BLD: 72.4 SEC — HIGH (ref 24.5–35.6)
AST SERPL-CCNC: 20 U/L — SIGNIFICANT CHANGE UP (ref 10–40)
BASOPHILS # BLD AUTO: 0.04 K/UL — SIGNIFICANT CHANGE UP (ref 0–0.2)
BASOPHILS NFR BLD AUTO: 0.6 % — SIGNIFICANT CHANGE UP (ref 0–2)
BILIRUB SERPL-MCNC: 0.3 MG/DL — SIGNIFICANT CHANGE UP (ref 0.2–1.2)
BUN SERPL-MCNC: 13 MG/DL — SIGNIFICANT CHANGE UP (ref 7–23)
BUN SERPL-MCNC: 14 MG/DL — SIGNIFICANT CHANGE UP (ref 7–23)
CALCIUM SERPL-MCNC: 10.2 MG/DL — SIGNIFICANT CHANGE UP (ref 8.4–10.5)
CALCIUM SERPL-MCNC: 10.3 MG/DL — SIGNIFICANT CHANGE UP (ref 8.4–10.5)
CHLORIDE SERPL-SCNC: 96 MMOL/L — SIGNIFICANT CHANGE UP (ref 96–108)
CHLORIDE SERPL-SCNC: 98 MMOL/L — SIGNIFICANT CHANGE UP (ref 96–108)
CO2 SERPL-SCNC: 27 MMOL/L — SIGNIFICANT CHANGE UP (ref 22–31)
CO2 SERPL-SCNC: 28 MMOL/L — SIGNIFICANT CHANGE UP (ref 22–31)
CREAT SERPL-MCNC: 0.69 MG/DL — SIGNIFICANT CHANGE UP (ref 0.5–1.3)
CREAT SERPL-MCNC: 0.72 MG/DL — SIGNIFICANT CHANGE UP (ref 0.5–1.3)
EGFR: 100 ML/MIN/1.73M2 — SIGNIFICANT CHANGE UP
EGFR: 101 ML/MIN/1.73M2 — SIGNIFICANT CHANGE UP
EOSINOPHIL # BLD AUTO: 0.26 K/UL — SIGNIFICANT CHANGE UP (ref 0–0.5)
EOSINOPHIL NFR BLD AUTO: 3.6 % — SIGNIFICANT CHANGE UP (ref 0–6)
GLUCOSE BLDC GLUCOMTR-MCNC: 131 MG/DL — HIGH (ref 70–99)
GLUCOSE BLDC GLUCOMTR-MCNC: 137 MG/DL — HIGH (ref 70–99)
GLUCOSE BLDC GLUCOMTR-MCNC: 137 MG/DL — HIGH (ref 70–99)
GLUCOSE SERPL-MCNC: 124 MG/DL — HIGH (ref 70–99)
GLUCOSE SERPL-MCNC: 127 MG/DL — HIGH (ref 70–99)
HCT VFR BLD CALC: 34.1 % — LOW (ref 39–50)
HGB BLD-MCNC: 11 G/DL — LOW (ref 13–17)
IMM GRANULOCYTES NFR BLD AUTO: 0.6 % — SIGNIFICANT CHANGE UP (ref 0–0.9)
INR BLD: 1.18 RATIO — HIGH (ref 0.85–1.16)
LYMPHOCYTES # BLD AUTO: 1.93 K/UL — SIGNIFICANT CHANGE UP (ref 1–3.3)
LYMPHOCYTES # BLD AUTO: 26.8 % — SIGNIFICANT CHANGE UP (ref 13–44)
MAGNESIUM SERPL-MCNC: 2 MG/DL — SIGNIFICANT CHANGE UP (ref 1.6–2.6)
MAGNESIUM SERPL-MCNC: 2 MG/DL — SIGNIFICANT CHANGE UP (ref 1.6–2.6)
MCHC RBC-ENTMCNC: 29.6 PG — SIGNIFICANT CHANGE UP (ref 27–34)
MCHC RBC-ENTMCNC: 32.3 GM/DL — SIGNIFICANT CHANGE UP (ref 32–36)
MCV RBC AUTO: 91.7 FL — SIGNIFICANT CHANGE UP (ref 80–100)
MONOCYTES # BLD AUTO: 0.77 K/UL — SIGNIFICANT CHANGE UP (ref 0–0.9)
MONOCYTES NFR BLD AUTO: 10.7 % — SIGNIFICANT CHANGE UP (ref 2–14)
NEUTROPHILS # BLD AUTO: 4.15 K/UL — SIGNIFICANT CHANGE UP (ref 1.8–7.4)
NEUTROPHILS NFR BLD AUTO: 57.7 % — SIGNIFICANT CHANGE UP (ref 43–77)
NRBC # BLD: 0 /100 WBCS — SIGNIFICANT CHANGE UP (ref 0–0)
PHOSPHATE SERPL-MCNC: 4.5 MG/DL — SIGNIFICANT CHANGE UP (ref 2.5–4.5)
PHOSPHATE SERPL-MCNC: 4.6 MG/DL — HIGH (ref 2.5–4.5)
PLATELET # BLD AUTO: 220 K/UL — SIGNIFICANT CHANGE UP (ref 150–400)
POTASSIUM SERPL-MCNC: 3.9 MMOL/L — SIGNIFICANT CHANGE UP (ref 3.5–5.3)
POTASSIUM SERPL-MCNC: 4 MMOL/L — SIGNIFICANT CHANGE UP (ref 3.5–5.3)
POTASSIUM SERPL-SCNC: 3.9 MMOL/L — SIGNIFICANT CHANGE UP (ref 3.5–5.3)
POTASSIUM SERPL-SCNC: 4 MMOL/L — SIGNIFICANT CHANGE UP (ref 3.5–5.3)
PROT SERPL-MCNC: 7.8 G/DL — SIGNIFICANT CHANGE UP (ref 6–8.3)
PROTHROM AB SERPL-ACNC: 13.4 SEC — SIGNIFICANT CHANGE UP (ref 9.9–13.4)
RBC # BLD: 3.72 M/UL — LOW (ref 4.2–5.8)
RBC # FLD: 13.5 % — SIGNIFICANT CHANGE UP (ref 10.3–14.5)
SODIUM SERPL-SCNC: 136 MMOL/L — SIGNIFICANT CHANGE UP (ref 135–145)
SODIUM SERPL-SCNC: 137 MMOL/L — SIGNIFICANT CHANGE UP (ref 135–145)
WBC # BLD: 7.19 K/UL — SIGNIFICANT CHANGE UP (ref 3.8–10.5)
WBC # FLD AUTO: 7.19 K/UL — SIGNIFICANT CHANGE UP (ref 3.8–10.5)

## 2024-10-15 PROCEDURE — 74176 CT ABD & PELVIS W/O CONTRAST: CPT | Mod: 26

## 2024-10-15 PROCEDURE — 99232 SBSQ HOSP IP/OBS MODERATE 35: CPT | Mod: GC

## 2024-10-15 RX ORDER — HEPARIN SODIUM 10000 [USP'U]/ML
11 INJECTION INTRAVENOUS; SUBCUTANEOUS
Qty: 25000 | Refills: 0 | Status: DISCONTINUED | OUTPATIENT
Start: 2024-10-15 | End: 2024-10-15

## 2024-10-15 RX ORDER — HEPARIN SODIUM 10000 [USP'U]/ML
1100 INJECTION INTRAVENOUS; SUBCUTANEOUS
Qty: 25000 | Refills: 0 | Status: DISCONTINUED | OUTPATIENT
Start: 2024-10-15 | End: 2024-10-16

## 2024-10-15 RX ADMIN — FLUTICASONE PROPIONATE AND SALMETEROL XINAFOATE 1 DOSE(S): 230; 21 AEROSOL, METERED RESPIRATORY (INHALATION) at 06:13

## 2024-10-15 RX ADMIN — PANTOPRAZOLE SODIUM 40 MILLIGRAM(S): 40 TABLET, DELAYED RELEASE ORAL at 13:02

## 2024-10-15 RX ADMIN — FLUTICASONE PROPIONATE AND SALMETEROL XINAFOATE 1 DOSE(S): 230; 21 AEROSOL, METERED RESPIRATORY (INHALATION) at 17:25

## 2024-10-15 RX ADMIN — HEPARIN SODIUM 11 UNIT(S)/HR: 10000 INJECTION INTRAVENOUS; SUBCUTANEOUS at 08:00

## 2024-10-15 RX ADMIN — Medication 10 MILLIGRAM(S): at 06:13

## 2024-10-15 RX ADMIN — Medication 100 MILLIGRAM(S): at 13:03

## 2024-10-15 RX ADMIN — Medication 40 MILLIGRAM(S): at 21:54

## 2024-10-15 RX ADMIN — Medication 200 MILLIGRAM(S): at 06:13

## 2024-10-15 RX ADMIN — POLYETHYLENE GLYCOL 3350 17 GRAM(S): 17 POWDER, FOR SOLUTION ORAL at 13:03

## 2024-10-15 RX ADMIN — Medication 25 MILLIGRAM(S): at 17:25

## 2024-10-15 RX ADMIN — Medication 25 MILLIGRAM(S): at 06:13

## 2024-10-15 NOTE — PROGRESS NOTE ADULT - PROBLEM SELECTOR PLAN 4
- Initially presented to [facility] on 9/14 for a mechanical fall after he tripped and fell onto his R shoulder  - XR showing R proximal humerus fracture, ORIF with Orthopedic Surgery at [facility] cancelled due to MICU course  > Orthopedic surgery on 10/7 said do outpt surgery.

## 2024-10-15 NOTE — PROGRESS NOTE ADULT - ASSESSMENT
69-year-old PMH of HTN, T2DM, ?stroke, atrial flutter, CAD s/p CABG x3 (~2018), and alcohol use disorder who initially presented to [facility] on 9/14 for a mechanical fall after he tripped and fell onto his R shoulder. He was diagnosed with R proximal humerus fracture with planned ORIF with Orthopedic Surgery. Patient's course was c/b alcohol w/d and he was started on CIWA protocol with chlordiazepoxide. Patient experienced acute hypercapnic RF - thought to be d/t benzodiazepine use. Was given flumazenil and was intubated. Transferred to MICU. His ORIF was cancelled d/t CT head with evidence of subacute to chronic SDHs. Extubated 9/19 - with ongoing dysphagia since. He refused PEG placement - NGT in place. On 10/4 - he was found to be hypoxic with CTA chest showing large PE of the R main PA. US of the LEs disclosed multiple b/l DVTs. Transferred to [facility] for further management of this PE. Given history of subacute vs. chronic subdural hematomas, patient underwent CTH on 10/7/2024 - exam degraded by motion at the vertex. Primary team d/w NSGY - they were OK with initiating heparin gtt. Patient now transitioned to apixaban 10mg BID for VTE. Underwent MRI brain on 10/8/2024, given ongoing dysphagia since extubation on 9/19/2024. Punctate infarcts seen in the right cerebellum and left centrum semiovale. Additionally, patient underwent swallow bedside assessment on 10/9 - he has prolonged bolus manipulation, prolonged bolus transport, and there is suspicion for laryngeal penetration/aspiration. Due for FEES. Vascular Neurology is consulted for MRI findings.    Recent vitals notable for: afebrile, HR 70s-80s, BP to 149/80, RR 18, SpO2 94-98% on 4L O2 via NC. Labs notable for: Hg 9.6, potassium 3.4, glucose 136, A1c 6.1. MRI brain w/wo with punctate acute/subacute infarcts in the R cerebellum and left centrum semiovale, possible chronic infarct in the left basal ganglia, and white matter hyperintensities of presumed vascular origin. Exam notable for chronically ill-appearing man with NGT in place, RUE immobilized in sling, there is no difficulty with prolonged upgaze, no eyelid ptosis, facial strength intact without asymmetry b/l - no eyelid closure weakness, no air escape when puffed cheeks are pushed in, +hypophonia, no drift in his extremities (other than RUE which has a proximal humeral fracture).  NIHSS: 4 (+1 questions, +3 RUE)  LKN: Unknown  pre-MRS: estimated at least 1  MRI brain w/wo with punctate acute/subacute infarcts in the R cerebellum and left centrum semiovale, possible chronic infarct in the left basal ganglia, and white matter hyperintensities  A1c 6.1 (10/5/2024)  TTE (10/5/2024): LVEF 60-65%, no RWMA, normal left atrium, intact interatrial septum      Impression:   1) Incidental infarcts in the right cerebellum and left centrum semiovale. Mechanism cardioembolic d/t atrial flutter.  2) Isolated dysphagia/dysphonia - MRI brain w/wo unrevealing with regard to etiology. Exam is not c/w myasthenia gravis.  3) mechanical fall   4) h/o SDH   5) ETOH abuse     Recommendations:  - Dysphagia screen already performed - on tube feeds via NGT;  now peg planning 10/16; f/u CT abd   - SBP goal - OK for normotension from neurovascular perspective (BP goal <130/80)  - Continue apixaban for lifelong anticoagulation for atrial flutter - currently on loading dose 10mg BID for VTE, he can continue 5mg BID for secondary stroke prevention when dose is reduced  - Regarding initiation of statin -  would start atorvastatin 40mg QHS if no objection, target LDL <70 for secondary stroke prevention  - CT angiogram of the head/neck w/IV contrast can be performed on a nonurgent basis to complete stroke workup but unlikely to change stroke management in this patient  - Vitals ordered q4h per primary, likely does not need routine neurochecks at this time (from neurovascular perspective) given infarcts are asymptomatic  - Fall and aspiration precautions  - PT/OT  - DVT prophylaxis: therapeutic AC  - Stroke education provided  - Smoking cessation education provided  - Please document MRS on discharge  - for ETOH h/o would c/w thimiamine.  folic acid   - Regarding dysphagia/dysphonia:  Needs ongoing multidisciplinary workup. F/u SLP recommendations. Consider ENT and GI evaluations; from neuro standpoint no etiology for dysphagia.  Can obtain myasthenia gravis labs although very low clinical suspicion: acetylcholine blocking antibody, acetylcholine modulating antibody, acetylcholine receptor binding antibody, LRP4 autoantibody test, MuSK antibody test  - If no other explanation is found for symptoms: follow up outpatient with Neurology for further evaluation including possible EMG  - Upon discharge, he should follow up with Dr. Akbar Elena (Vascular Neurology): [address], [place] [address]. [phone].    Akbar Elena MD  Vascular Neurology  Office: [phone] .

## 2024-10-15 NOTE — PROGRESS NOTE ADULT - SUBJECTIVE AND OBJECTIVE BOX
INTERVAL HPI/OVERNIGHT EVENTS:  NPO @MN, heparin dc'd at 6:00, restarted. Pt seen and examined at bedside, sleeping. Endorses mild nausea ON, does not want meds. Denies HA, SOB, CP, or any pain. Drowsy throughout interview.  Brief Daily Plan: Awaiting CT noncon w/ plan for PEG 11/16.    T(C): 36.7 (10-15-24 @ 04:46), Max: 36.8 (10-14-24 @ 20:24)  HR: 83 (10-15-24 @ 04:46) (73 - 83)  BP: 128/83 (10-15-24 @ 04:46) (114/80 - 128/83)  RR: 18 (10-15-24 @ 04:46) (18 - 18)  SpO2: 96% (10-15-24 @ 04:46) (93% - 96%)    CONSTITUTIONAL: NAD, sleeping comfortably, AAOx1 ("bank," "2004)  HEENT: MMM  RESPIRATORY: Normal respiratory effort; lungs are clear to auscultation bilaterally  CARDIOVASCULAR: Regular rate and rhythm, normal S1 and S2, +systolic murmur  ABDOMEN: Nontender to palpation, normoactive bowel sounds, no rebound/guarding  MUSCLOSKELETAL:  Moving limbs spontaneously; RUE in sling; No lower extremity edema  PSYCH: Affect appropriate, pleasant    LABS:                         11.0   7.19  )-----------( 220      ( 15 Oct 2024 06:04 )             34.1     10-15    137  |  98  |  13  ----------------------------<  124[H]  4.0   |  28  |  0.69    Ca    10.3      15 Oct 2024 06:04  Phos  4.6     10-15  Mg     2.0     10-15    TPro  7.8  /  Alb  4.1  /  TBili  0.3  /  DBili  x   /  AST  20  /  ALT  18  /  AlkPhos  101  10-15    PT/INR - ( 15 Oct 2024 06:04 )   PT: 13.4 sec;   INR: 1.18 ratio         PTT - ( 15 Oct 2024 06:04 )  PTT:72.1 sec  Urinalysis Basic - ( 15 Oct 2024 06:04 )    Color: x / Appearance: x / SG: x / pH: x  Gluc: 124 mg/dL / Ketone: x  / Bili: x / Urobili: x   Blood: x / Protein: x / Nitrite: x   Leuk Esterase: x / RBC: x / WBC x   Sq Epi: x / Non Sq Epi: x / Bacteria:         RADIOLOGY, EKG & ADDITIONAL TESTS: Reviewed.      INTERVAL HPI/OVERNIGHT EVENTS:  NPO @MN, heparin dc'd at 6:00, restarted. Pt seen and examined at bedside, sleeping. Endorses mild nausea ON, does not want meds. Denies HA, SOB, CP, or any pain. Drowsy throughout interview.    Brief Daily Plan:   - Awaiting CT noncon w/ plan for PEG 11/16.  - NPO at midnight  - Stop heparin infusion 10/16 at 6am if cleared with GI     T(C): 36.7 (10-15-24 @ 04:46), Max: 36.8 (10-14-24 @ 20:24)  HR: 83 (10-15-24 @ 04:46) (73 - 83)  BP: 128/83 (10-15-24 @ 04:46) (114/80 - 128/83)  RR: 18 (10-15-24 @ 04:46) (18 - 18)  SpO2: 96% (10-15-24 @ 04:46) (93% - 96%)    CONSTITUTIONAL: NAD, sleeping comfortably, AAOx1 ("bank," "2004)  HEENT: MMM  RESPIRATORY: Normal respiratory effort; lungs are clear to auscultation bilaterally  CARDIOVASCULAR: Regular rate and rhythm, normal S1 and S2, +systolic murmur  ABDOMEN: Nontender to palpation, normoactive bowel sounds, no rebound/guarding  MUSCLOSKELETAL:  Moving limbs spontaneously; RUE in sling; No lower extremity edema  PSYCH: Affect appropriate, pleasant    LABS:                         11.0   7.19  )-----------( 220      ( 15 Oct 2024 06:04 )             34.1     10-15    137  |  98  |  13  ----------------------------<  124[H]  4.0   |  28  |  0.69    Ca    10.3      15 Oct 2024 06:04  Phos  4.6     10-15  Mg     2.0     10-15    TPro  7.8  /  Alb  4.1  /  TBili  0.3  /  DBili  x   /  AST  20  /  ALT  18  /  AlkPhos  101  10-15    PT/INR - ( 15 Oct 2024 06:04 )   PT: 13.4 sec;   INR: 1.18 ratio         PTT - ( 15 Oct 2024 06:04 )  PTT:72.1 sec  Urinalysis Basic - ( 15 Oct 2024 06:04 )    Color: x / Appearance: x / SG: x / pH: x  Gluc: 124 mg/dL / Ketone: x  / Bili: x / Urobili: x   Blood: x / Protein: x / Nitrite: x   Leuk Esterase: x / RBC: x / WBC x   Sq Epi: x / Non Sq Epi: x / Bacteria:         RADIOLOGY, EKG & ADDITIONAL TESTS: Reviewed.      INTERVAL HPI/OVERNIGHT EVENTS:  NPO @MN, heparin dc'd at 6:00, restarted. Pt seen and examined at bedside, sleeping. Endorses mild nausea ON, does not want meds. Denies HA, SOB, CP, or any pain. Drowsy throughout interview.    Brief Daily Plan:   - Awaiting CT noncon w/ plan for PEG 11/16.  - NPO at midnight  - Stop heparin infusion 10/16 at 6am if cleared with GI     T(C): 36.7 (10-15-24 @ 04:46), Max: 36.8 (10-14-24 @ 20:24)  HR: 83 (10-15-24 @ 04:46) (73 - 83)  BP: 128/83 (10-15-24 @ 04:46) (114/80 - 128/83)  RR: 18 (10-15-24 @ 04:46) (18 - 18)  SpO2: 96% (10-15-24 @ 04:46) (93% - 96%)    CONSTITUTIONAL: NAD, sleeping comfortably, AAOx1 ("bank," "2004)  HEENT: MMM  RESPIRATORY: Normal respiratory effort; lungs are clear to auscultation bilaterally  CARDIOVASCULAR: Regular rate and rhythm, normal S1 and S2, +systolic murmur  ABDOMEN: Nontender to palpation, normoactive bowel sounds, no rebound/guarding  MUSCULOSKELETAL:  Moving limbs spontaneously; RUE in sling; No lower extremity edema  PSYCH: Affect appropriate, pleasant    LABS:                         11.0   7.19  )-----------( 220      ( 15 Oct 2024 06:04 )             34.1     10-15    137  |  98  |  13  ----------------------------<  124[H]  4.0   |  28  |  0.69    Ca    10.3      15 Oct 2024 06:04  Phos  4.6     10-15  Mg     2.0     10-15    TPro  7.8  /  Alb  4.1  /  TBili  0.3  /  DBili  x   /  AST  20  /  ALT  18  /  AlkPhos  101  10-15    PT/INR - ( 15 Oct 2024 06:04 )   PT: 13.4 sec;   INR: 1.18 ratio         PTT - ( 15 Oct 2024 06:04 )  PTT:72.1 sec  Urinalysis Basic - ( 15 Oct 2024 06:04 )    Color: x / Appearance: x / SG: x / pH: x  Gluc: 124 mg/dL / Ketone: x  / Bili: x / Urobili: x   Blood: x / Protein: x / Nitrite: x   Leuk Esterase: x / RBC: x / WBC x   Sq Epi: x / Non Sq Epi: x / Bacteria:         RADIOLOGY, EKG & ADDITIONAL TESTS: Reviewed.

## 2024-10-15 NOTE — PROGRESS NOTE ADULT - SUBJECTIVE AND OBJECTIVE BOX
Neurology Progress Note    S: Patient seen and examined. No new events overnight.     Medication:    Medications: MEDICATIONS  (STANDING):  aMIOdarone    Tablet 200 milliGRAM(s) Oral daily  amLODIPine   Tablet 10 milliGRAM(s) Oral daily  atorvastatin 40 milliGRAM(s) Oral at bedtime  dextrose 5%. 1000 milliLiter(s) (100 mL/Hr) IV Continuous <Continuous>  dextrose 5%. 1000 milliLiter(s) (50 mL/Hr) IV Continuous <Continuous>  dextrose 50% Injectable 12.5 Gram(s) IV Push once  dextrose 50% Injectable 25 Gram(s) IV Push once  dextrose 50% Injectable 25 Gram(s) IV Push once  dextrose Oral Gel 15 Gram(s) Oral once  fluticasone propionate/ salmeterol 250-50 MICROgram(s) Diskus 1 Dose(s) Inhalation two times a day  glucagon  Injectable 1 milliGRAM(s) IntraMuscular once  heparin  Infusion 1100 Unit(s)/Hr (11 mL/Hr) IV Continuous <Continuous>  insulin lispro (ADMELOG) corrective regimen sliding scale   SubCutaneous at bedtime  insulin lispro (ADMELOG) corrective regimen sliding scale   SubCutaneous every 6 hours  metoprolol tartrate 25 milliGRAM(s) Oral two times a day  pantoprazole  Injectable 40 milliGRAM(s) IV Push daily  polyethylene glycol 3350 17 Gram(s) Oral daily  thiamine 100 milliGRAM(s) Oral daily    MEDICATIONS  (PRN):  albuterol/ipratropium for Nebulization 3 milliLiter(s) Nebulizer every 6 hours PRN Shortness of Breath and/or Wheezing       Vitals:  Vital Signs Last 24 Hrs  T(C): 36.7 (15 Oct 2024 04:46), Max: 36.8 (14 Oct 2024 20:24)  T(F): 98.1 (15 Oct 2024 04:46), Max: 98.2 (14 Oct 2024 20:24)  HR: 83 (15 Oct 2024 04:46) (73 - 83)  BP: 128/83 (15 Oct 2024 04:46) (114/80 - 128/83)  BP(mean): --  RR: 18 (15 Oct 2024 04:46) (18 - 18)  SpO2: 96% (15 Oct 2024 04:46) (93% - 96%)    Parameters below as of 15 Oct 2024 04:46  Patient On (Oxygen Delivery Method): room air                  General Exam:   General Appearance: Appropriately dressed and in no acute distress       Head: Normocephalic, atraumatic and no dysmorphic features  Ear, Nose, and Throat: Moist mucous membranes +NGT   CVS: S1S2+  Resp: No SOB, no wheeze or rhonchi  Abd: soft NTND  Extremities: No edema, no cyanosis  Skin: No bruises, no rashes     Neurological Exam:    - Awake, Alert  - Oriented to: person/age. Knows he is in the hospital, but not which. Does not know month/year--> better AAOx2 now   - Speech: fluent  - Repetition and naming: intact   - Follows simple commands. Performs cross command incorrectly - does not cross.  - Fund of knowledge: knows current president    Cranial nerves - PERRL, BTT b/l, EOMI - there is no difficulty with prolonged upgaze, no eyelid ptosis, face sensation (V1-V3) intact b/l, facial strength intact without asymmetry b/l - no eyelid closure weakness, no air escape when puffed cheeks are pushed in, hearing grossly intact, palate with symmetric elevation, shoulder shrug likely slightly reduced R side d/t orthopedic limitation, tongue midline on protrusion with full lateral movement  Dysarthria: not clearly dysarthric but extremely hypophonic    Motor -  No drift in the LUE x at least 10 seconds  He can move his RUE distally, but cannot lift it AG - orthopedic limitation d/t R proximal humerus fracture  LLE/RLE - no drift x at least 5 seconds b/l    Sensation - Light touch intact throughout    No ankle clonus    Coordination - FNF intact LUE (cannot perform RUE d/t orthopedic limitation), HTS intact RLE, cannot perform LLE but suspect d/t orthopedic limitation - toe to finger intact b/l.    Gait and station - Unable to assess d/t fall risk/safety concerns.      I personally reviewed the below data/images/labs:        LABS:                          11.0   7.19  )-----------( 220      ( 15 Oct 2024 06:04 )             34.1     10-15    137  |  98  |  13  ----------------------------<  124[H]  4.0   |  28  |  0.69    Ca    10.3      15 Oct 2024 06:04  Phos  4.6     10-15  Mg     2.0     10-15    TPro  7.8  /  Alb  4.1  /  TBili  0.3  /  DBili  x   /  AST  20  /  ALT  18  /  AlkPhos  101  10-15    LIVER FUNCTIONS - ( 15 Oct 2024 06:04 )  Alb: 4.1 g/dL / Pro: 7.8 g/dL / ALK PHOS: 101 U/L / ALT: 18 U/L / AST: 20 U/L / GGT: x           PT/INR - ( 15 Oct 2024 06:04 )   PT: 13.4 sec;   INR: 1.18 ratio         PTT - ( 15 Oct 2024 06:04 )  PTT:72.1 sec  Urinalysis Basic - ( 15 Oct 2024 06:04 )    Color: x / Appearance: x / SG: x / pH: x  Gluc: 124 mg/dL / Ketone: x  / Bili: x / Urobili: x   Blood: x / Protein: x / Nitrite: x   Leuk Esterase: x / RBC: x / WBC x   Sq Epi: x / Non Sq Epi: x / Bacteria: x          :  MR BRAIN WAW IC    PROCEDURE DATE:  10/08/2024      INTERPRETATION:  .    CLINICAL INFORMATION: Dysphagia. Subacute on chronic subdural hematomas   on outside head CT.    TECHNIQUE: Multiplanar multisequential MRI of the brain was acquired with   and without the administration of IV gadolinium. 7.5 cc's of IV Gadavist   was administered for the purposes of this examination. 0 cc were   discarded.    COMPARISON: Prior CT study of the head from 10/7/2024. Outside CT study   of the head dated 9/19/2024.    FINDINGS: There is a small focus of diffusion restriction within the   right cerebellar hemisphere (series 5, image 38). An additional small   focus of diffusion reduction is seen within the left posterior centrum   semiovale (series 5, image 54). Corresponding areas of T2/FLAIR   hyperintense signal change are seen in these areas.    A small area of encephalomalacia and gliosis is seen within the left   basal ganglia.    Multiple additional patchy confluent nonspecific T2/FLAIR hyperintense   signal changes are noted throughout the bihemispheric white matter   without associated mass effect or restricted diffusion.    There is no abnormal brain parenchymal or leptomeningeal enhancement.    No hydrocephalus is seen. There are no abnormal extra-axial fluid   collections. Flow-voids are noted throughout the major intracranial   vessels, on the T2 weighted images, consistent with their patency. The   sella turcica and posterior fossa are unremarkable.    Mild scattered mucosal thickening is seen throughout the paranasal   sinuses. The bilateral tympanomastoid cavities are clear. The calvarium   appears intact. The orbits appear unremarkable apart from a disconjugate   gaze.    IMPRESSION: Small acute/subacute infarcts within the right cerebellar   hemisphere and left posterior centrum semiovale with associated cytotoxic   edema.    No acute intracranial hemorrhage.    Multiple patchy confluent nonspecific abnormal white matter foci of   T2/FLAIR prolongation statistically favoring microvascular type changes.    Additional small area of encephalomalacia and gliosis within the left   basal ganglia which may related to chronic infarction and/or hemorrhage.

## 2024-10-15 NOTE — PROGRESS NOTE ADULT - PROBLEM SELECTOR PLAN 3
- US of b/l LEs at [facility] -> R peroneal vein and L popliteal, tibial trunk, peroneal, and posterior tibial DVTs  > Repeat US of b/l LEs showing mulitple DVTs  > C/w heparin as above

## 2024-10-15 NOTE — PROGRESS NOTE ADULT - PROBLEM SELECTOR PLAN 4
- Initially presented to [facility] on 9/14 for a mechanical fall after he tripped and fell onto his R shoulder  - XR showing R proximal humerus fracture, ORIF with Orthopedic Surgery at [facility] cancelled due to MICU course  > Orthopedic surgery on 10/7 said do outpt surgery.

## 2024-10-15 NOTE — ADVANCED PRACTICE NURSE CONSULT - RECOMMEDATIONS
Impression:   1) R Elbow Unstable Ulcer - Protruding fredrick prominence measuring approximately 1.5 x 1.5 x 0.1 cm. Cleanse and pat dry then apply Cavilon to periwound and apply Medi Honey daily and cover with Allevyn Foam Border  2) R Fields - Adherent, healing wound with scattered adherent scabbing. No drainage. Measuring Approximately 4.5 x 1.5 x 0.1 cm. Cleanse and pat dry then apply Cavilon to periwound  3) B/L Heels & B/L Feet -  Sween 24 cream to moisturize. - Podiatric consult for nail care. Heel offloading boots when patient is in bed.     Recommendations:   1) Cleanse and pat dry then apply Cavilon to periwound and cover with Allevyn foam dressing, every other day and or PRN soiling.   - Turning and repositioning efforts.   - Heel offloading boots when in bed.   - Skin and health integrity  - Podiatric consult for nail care     Treatment plan discussed with patient and DAGMAR Layne.   Impression:   1) R Elbow Unstable Ulcer - Protruding fredrick prominence measuring approximately 1.5 x 1.5 x 0.1 cm. Cleanse and pat dry then apply Cavilon to periwound and apply Medi Honey daily and cover with Allevyn Foam Border  2) R Fields - Adherent, healing wound with scattered adherent scabbing. No drainage. Measuring Approximately 4.5 x 1.5 x 0.1 cm. Cleanse and pat dry then apply Cavilon to periwound  3) B/L Heels & B/L Feet -  Sween 24 cream to moisturize. - Podiatric consult for nail care. Heel offloading boots when patient is in bed.     Recommendations:   - Turning and repositioning efforts.   - Heel offloading boots when in bed.   - Skin and health integrity  - Maintain optimal skin integrity to high pressure areas  - Nutrition and wound healing  - Podiatric consult for nail care     Treatment plan discussed with patient and DAGMAR Layne.   Impression:   1) R Elbow Unstageable Ulcer - Protruding fredrick prominence measuring approximately 1.5 x 1.5 x 0.1 cm. Cleanse and pat dry then apply Cavilon to periwound and apply Medi Honey daily and cover with Allevyn Foam Border  2) R Fields - Adherent, healing wound with scattered adherent scabbing. No drainage. Measuring Approximately 4.5 x 1.5 x 0.1 cm. Cleanse and pat dry then apply Cavilon to periwound  3) B/L Heels & B/L Feet -  Sween 24 cream to moisturize. - Podiatric consult for nail care. Heel offloading boots when patient is in bed.     Recommendations:   - Turning and repositioning efforts.   - Heel offloading boots when in bed.   - Skin and health integrity  - Maintain optimal skin integrity to high pressure areas  - Nutrition and wound healing  - Podiatric consult for nail care     Treatment plan discussed with patient and DAGMAR Layne.   Impression:   1) R Elbow Un-stageable Ulcer - Protruding fredirck prominence measuring approximately 1.5 x 1.5 x 0.1 cm. Cleanse and pat dry then apply Cavilon to periwound and apply Medi Honey daily and cover with Allevyn Foam Border  2) R Fields - Wound with scattered adherent scabbing. No drainage. Measuring Approximately 4.5 x 1.5 x 0.1 cm. Cleanse and pat dry then apply Cavilon to periwound  3) B/L Heels & B/L Feet -  Sween 24 cream to moisturize. - Podiatric consult for nail care. Heel offloading boots when patient is in bed.     Recommendations:   - Turning and repositioning efforts.   - Heel offloading boots when in bed.   - Skin and health integrity  - Maintain optimal skin integrity to high pressure areas  - Nutrition and wound healing  - Podiatric consult for nail care     Treatment plan discussed with patient and DAGMAR Layne.

## 2024-10-15 NOTE — CHART NOTE - NSCHARTNOTEFT_GEN_A_CORE
GI consulted for PEG. Pt pending CT scan prior to PEG placement.     - Please expedite CT scan  - Tentative plan for PEG tube placement tomorrow if CT done  - Keep patient NPO MN and hold tube feeds at MN  - Hold heparin gtt at 3 am for procedure    Venita Proctor, PGY4  Gastroenterology/Hepatology Fellow  Available on Microsoft Teams  [phone] (Long Range Pager)  [phone] (Short Range Pager [facility])    After 5 pm, please contact the on-call GI fellow for any urgent issues via the Hospital Call  GI consulted for PEG. Pt pending CT scan prior to PEG placement.     - Please expedite CT scan  - Tentative plan for PEG tube placement tomorrow if CT done  - Keep patient NPO MN and hold tube feeds at MN  - Hold heparin gtt at 6 am for procedure    Venita Proctor, PGY4  Gastroenterology/Hepatology Fellow  Available on Microsoft Teams  [phone] (Long Range Pager)  [phone] (Short Range Pager [facility])    After 5 pm, please contact the on-call GI fellow for any urgent issues via the Hospital Call

## 2024-10-15 NOTE — PROGRESS NOTE ADULT - PROBLEM SELECTOR PLAN 1
- Hospital course at [facility] c/b AHRF  - CTA chest (10/4) -> Large PE of R main pulmonary artery  - US of b/l LEs -> R peroneal vein and L popliteal, tibial trunk, peroneal, and posterior tibial DVTs  - EKG showing evidence of right heart strain, S1Q3T3 changes, T wave depressions in precordial leads; not present on EKG from 9/15  - TTE (9/15) unremarkable  - Troponin 92, BNP 7168 on transfer  > CTH not acutely concerning  > C/w heparin gtt w/ target PTT-> 0.4-0.6 Repeat TTE not concerning  > Vascular cardiology consulted rec heparin for now and eventual transition to DOAC  - Neurosurg consulted for AC management, they said okay to PTT 60-80  - Transitioned to DOAC loading eliquis but transitioned back to heparin pending PEG placement (need to stop 6 hours before procedure and obtain cardiac clearance on Monday)'  - heparin gtt 14 --> 10 --> 11 ml/hr for aPTT 53.8 (goal 60-80)

## 2024-10-15 NOTE — PROGRESS NOTE ADULT - ASSESSMENT
Mr. Jarrett Overton is a 69-year-old w/ PMH of HTN, T2DM, CVA, A. flutter, CAD s/p CABG x3 (~2018), and alcohol use disorder who initially presented to [facility] on 9/14 for a mechanical fall. On 10/4, patient was found to be hypoxic with subsequent CTA chest showing large PE of R main pulmonary artery. US of b/l LEs was further significant for b/l DVTs in the R peroneal vein and L popliteal, tibial trunk, peroneal, and posterior tibial veins. Patient was transferred to [facility] for further management of PE and tx of superimposed PNA, now pending PEG placement, dispo ROSALBA.

## 2024-10-15 NOTE — PROGRESS NOTE ADULT - PROBLEM SELECTOR PLAN 2
Hospital course at [facility] c/b AHRF s/p intubation on 9/18 for likely benzodiazepine overdose, extubated on 9/19. Diffuse rhonchi with copious secretions on exam. CTA chest (10/4) -> Large PE of R main pulmonary artery, scattered GGOs and interstitial changes predominantly in R and L lower lobes. Likely PE +/- superimposed aspiration PNA.    - C/w management of PE, as above  - s/p empiric Zosyn for 7d (10/5-10/12)  - On RA

## 2024-10-15 NOTE — PROGRESS NOTE ADULT - ASSESSMENT
Mr. Jarrett Overton is a 69-year-old w/ PMH of HTN, T2DM, CVA, A. flutter, CAD s/p CABG x3 (~2018), and alcohol use disorder who initially presented to [facility] on 9/14 for a mechanical fall. On 10/4, patient was found to be hypoxic with subsequent CTA chest showing large PE of R main pulmonary artery. US of b/l LEs was further significant for b/l DVTs in the R peroneal vein and L popliteal, tibial trunk, peroneal, and posterior tibial veins. Patient was transferred to [facility] for further management of PE and embolectomy evaluation.

## 2024-10-15 NOTE — PROGRESS NOTE ADULT - PROBLEM SELECTOR PLAN 1
- Hospital course at [facility] c/b AHRF  - CTA chest (10/4) -> Large PE of R main pulmonary artery  - US of b/l LEs -> R peroneal vein and L popliteal, tibial trunk, peroneal, and posterior tibial DVTs  - EKG showing evidence of right heart strain, S1Q3T3 changes, T wave depressions in precordial leads; not present on EKG from 9/15  - TTE (9/15) unremarkable  - Troponin 92, BNP 7168 on transfer  > CTH not acutely concerning  > C/w heparin gtt w/ target PTT-> 0.4-0.6 Repeat TTE not concerning  > Vascular cardiology consulted rec heparin for now and eventual transition to DOAC  - Neurosurg consulted for AC management, they said okay to PTT 60-80  - Transitioned to DOAC loading eliquis but transitioned back to heparin pending PEG placement (need to stop 6 hours before procedure and obtain cardiac clearance on Monday)'  - heparin gtt 14 --> 10 --> 11 ml/hr for aPTT 53.8 (goal 60-80)

## 2024-10-15 NOTE — PROGRESS NOTE ADULT - PROBLEM SELECTOR PLAN 2
# RESOLVED    Hospital course at [facility] c/b AHRF s/p intubation on 9/18 for likely benzodiazepine overdose, extubated on 9/19. Diffuse rhonchi with copious secretions on exam. CTA chest (10/4) -> Large PE of R main pulmonary artery, scattered GGOs and interstitial changes predominantly in R and L lower lobes. Likely PE +/- superimposed aspiration PNA s/p empiric Zosyn for 7d (10/5-10/12).    - C/w management of PE, as above  - On RA

## 2024-10-15 NOTE — PROGRESS NOTE ADULT - PROBLEM SELECTOR PLAN 3
- US of b/l LEs at [facility] -> R peroneal vein and L popliteal, tibial trunk, peroneal, and posterior tibial DVTs  > Repeat US of b/l LEs showing mulitple DVTs  > C/w heparin as above

## 2024-10-15 NOTE — PROGRESS NOTE ADULT - SUBJECTIVE AND OBJECTIVE BOX
INTERVAL HPI/OVERNIGHT EVENTS:  Pt seen and examined at bedside. No acute overnight events or complaints.    Brief Daily Plan:  -    VITAL SIGNS:  T(F): 98.1 (10-15-24 @ 04:46)  HR: 83 (10-15-24 @ 04:46)  BP: 128/83 (10-15-24 @ 04:46)  RR: 18 (10-15-24 @ 04:46)  SpO2: 96% (10-15-24 @ 04:46)  Wt(kg): --    PHYSICAL EXAM:    CONSTITUTIONAL: NAD, AOx2 (states year is 2004 but identifies president as Cynthia)  RESPIRATORY: Normal respiratory effort; lungs are clear to auscultation bilaterally  CARDIOVASCULAR: Regular rate and rhythm, normal S1 and S2, +systolic murmur; Peripheral pulses are 2+ bilaterally  ABDOMEN: Nontender to palpation, normoactive bowel sounds, no rebound/guarding  MUSCLOSKELETAL:  Moving limbs spontaneously; RUE in sling; No lower extremity edema  PSYCH: Affect appropriate    MEDICATIONS  (STANDING):  aMIOdarone    Tablet 200 milliGRAM(s) Oral daily  amLODIPine   Tablet 10 milliGRAM(s) Oral daily  atorvastatin 40 milliGRAM(s) Oral at bedtime  dextrose 5%. 1000 milliLiter(s) (100 mL/Hr) IV Continuous <Continuous>  dextrose 5%. 1000 milliLiter(s) (50 mL/Hr) IV Continuous <Continuous>  dextrose 50% Injectable 12.5 Gram(s) IV Push once  dextrose 50% Injectable 25 Gram(s) IV Push once  dextrose 50% Injectable 25 Gram(s) IV Push once  dextrose Oral Gel 15 Gram(s) Oral once  fluticasone propionate/ salmeterol 250-50 MICROgram(s) Diskus 1 Dose(s) Inhalation two times a day  glucagon  Injectable 1 milliGRAM(s) IntraMuscular once  heparin  Infusion 1100 Unit(s)/Hr (11 mL/Hr) IV Continuous <Continuous>  insulin lispro (ADMELOG) corrective regimen sliding scale   SubCutaneous at bedtime  insulin lispro (ADMELOG) corrective regimen sliding scale   SubCutaneous every 6 hours  metoprolol tartrate 25 milliGRAM(s) Oral two times a day  pantoprazole  Injectable 40 milliGRAM(s) IV Push daily  polyethylene glycol 3350 17 Gram(s) Oral daily  thiamine 100 milliGRAM(s) Oral daily    MEDICATIONS  (PRN):  albuterol/ipratropium for Nebulization 3 milliLiter(s) Nebulizer every 6 hours PRN Shortness of Breath and/or Wheezing      Allergies    No Known Allergies    Intolerances        LABS:                        11.0   7.19  )-----------( 220      ( 15 Oct 2024 06:04 )             34.1     10-15    137  |  98  |  13  ----------------------------<  124[H]  4.0   |  28  |  0.69    Ca    10.3      15 Oct 2024 06:04  Phos  4.6     10-15  Mg     2.0     10-15    TPro  7.8  /  Alb  4.1  /  TBili  0.3  /  DBili  x   /  AST  20  /  ALT  18  /  AlkPhos  101  10-15    PT/INR - ( 15 Oct 2024 06:04 )   PT: 13.4 sec;   INR: 1.18 ratio         PTT - ( 15 Oct 2024 06:04 )  PTT:72.1 sec  Urinalysis Basic - ( 15 Oct 2024 06:04 )    Color: x / Appearance: x / SG: x / pH: x  Gluc: 124 mg/dL / Ketone: x  / Bili: x / Urobili: x   Blood: x / Protein: x / Nitrite: x   Leuk Esterase: x / RBC: x / WBC x   Sq Epi: x / Non Sq Epi: x / Bacteria: x        RADIOLOGY & ADDITIONAL TESTS:  Reviewed  ******************  Authored By: Henok Nava MD, PGY1  MS Teams Preferred  ******************

## 2024-10-15 NOTE — ADVANCED PRACTICE NURSE CONSULT - ASSESSMENT
Patient encountered on 4 [facility] on 10/15/24  When wound care RN arrived on unit, patient was in semi-leon, upright position in bed accompanied by SELF. Patient was alert and oriented and gave consent to skin consult.    The wound care RN was able to visualize the right elbow after soft sling was removed by patient, an un-stageable ulcer of adherent fibrin tissue measuring approximately 1.5 x 1.5 x 0.1 cm with no drainage. Cleansed with sterile water solution, pat dry, applied Cavilon to surrounding skin, and applied Medi Honey topical wound gel and covered with Allevyn Bordered dressing.  The right shin was assessed and measured at approximately 4.5 x 1.5 x 0.1 cm with an adherent healing wound with 100% granular tissue with surrounding scattered adherent dry scabs with no drainage. Recommended to RN Xi to apply Cavilon and leave open to air.  On the B/L Heels, the skin was intact, hypo-pigmented, and dry, but cannot rule out component of DTI. Will recommend offloading boots and Sween 24 cream to moisturize. B/L Feet with thickened and elongated hypertrophic nails. Will request consultation from Podiatric team for foot and nail care.  Educated patient on skin condition, the plan for prevention of injury, and the importance of same. Reinforced activity limits and safety measures with patient. All questions answered to Patient's satisfaction.   Patient was left in a semi-leon position with side rails up and call-bell within reach.

## 2024-10-15 NOTE — PROGRESS NOTE ADULT - PROBLEM SELECTOR PLAN 8
10/8 MRI brain: Small acute/subacute infarcts within the right cerebellar   hemisphere and left posterior centrum semiovale with associated cytotoxic   edema  -Neuro consulted  - statin 40  - target LDL <70, SBP <130  - start thiamine  -f/u neuro outpt

## 2024-10-15 NOTE — PROGRESS NOTE ADULT - ATTENDING COMMENTS
69 year old male, with PMH of HTN, DM2, ?CVA/TIA, Aflutter, CAD s/p CABG (2018), EtOH use disorder (two 40oz beers daily), current smoker (8 cigs/day), L total hip arthroplasty, initially admitted to [facility] (9/14/24-10/4/24) after mech fall c/b R prox humeral fx and EtOH withdrawal. Was on CIWA with Librium/Ativan PRN. TTE on 9/15/24 showed LVEF 60-65%, grad I DD, and an aortic valve prosthesis with normal function. Course c/b AMS, acute hypoxemic/hypercapnic respiratory, and agonal breathing on 9/18/24. Was initially given flumazenil x2 with some improvement, but was intubated for airway protection given excessive secretions and transferred to MICU 9/18/24. CTA chest was somewhat limited study but did not find PE at the time, noted RUL GGOs "suspicious for atypical or viral infection." CTH showed subacute and chronic SDHs as well as chronic lacunar infarct within L lentiform nucleus. Repeat CT showed stable SDHs. Extubated on 9/19/24. Pt was downgraded to Medicine floors on 9/21/24. Noted to have loss of voice and dysphagia. Evaluated by S+S, found dysphagia on video flouroscopy to all textures and recommended for PEG tube, but pt refused and opted for NGT placement. Ortho surgery that was planned for humeral fx was cancelled and ultimately recommended for conservative management only. CXR on 10/3/24 showed new LLL infiltrate, started on Zosyn. On 10/4/24, pt desatted on supplemental O2, CTA chest found massive PE in R main bronchus with pulm trunk dilatation. BLE duplex also found b/l LE DVTs (L>R). Started on hep gtt and transferred to [facility] for embolectomy evaluation.      #Acute hypoxemic respiratory failure  #R main bronchus PE, b/l LE DVTs  #?PNA  #CVA  #Dysphagia  #R humeral fx  #Chronic aflutter  #DM2  #EtOH/tobacco use disorder    - on RA  - completed 7 day course of zosyn   - c/w amio and metoprolol (was on at OSH)  - c/w advair BID and duonebs q6h PRN  - c/w low ISS for now, monitor FS  - monitor on telemetry  - MRI head showing acute/subacute infarcts; neuro recs appreciated; f/u MG labs   - statin, thiamine  - TTE no acute findings   - speech and swallow, NPO, FEES -> did not pass study   - transitioned to eliquis 10mg BID for 7 days (including heparin drip days); now back on heparin drip for planned PEG tube placement; when ready to switch back to eliquis, will be on 5mg BID  - ortho - outpatient f/u   - started on NG feeds 10/5 but patient pulled out NGT 10/6 overnight; ENT replaced NGT on 10/7. Resume NGT feeds; dietitian recs noted  - plan for PEG placement tuesday, pending CT abd to eval for prior abdominal surgery findings; NPO after MN and hold heparin drip at 6am if no issues    - cardio recs appreciated; no further testing needed for PEG procedure    - eventual plan for ROSALBA     Rest as above. Discussed with HS. 69 year old male, with PMH of HTN, DM2, ?CVA/TIA, Aflutter, CAD s/p CABG (2018), EtOH use disorder (two 40oz beers daily), current smoker (8 cigs/day), L total hip arthroplasty, initially admitted to [facility] (9/14/24-10/4/24) after mech fall c/b R prox humeral fx and EtOH withdrawal. Was on CIWA with Librium/Ativan PRN. TTE on 9/15/24 showed LVEF 60-65%, grad I DD, and an aortic valve prosthesis with normal function. Course c/b AMS, acute hypoxemic/hypercapnic respiratory, and agonal breathing on 9/18/24. Was initially given flumazenil x2 with some improvement, but was intubated for airway protection given excessive secretions and transferred to MICU 9/18/24. CTA chest was somewhat limited study but did not find PE at the time, noted RUL GGOs "suspicious for atypical or viral infection." CTH showed subacute and chronic SDHs as well as chronic lacunar infarct within L lentiform nucleus. Repeat CT showed stable SDHs. Extubated on 9/19/24. Pt was downgraded to Medicine floors on 9/21/24. Noted to have loss of voice and dysphagia. Evaluated by S+S, found dysphagia on video flouroscopy to all textures and recommended for PEG tube, but pt refused and opted for NGT placement. Ortho surgery that was planned for humeral fx was cancelled and ultimately recommended for conservative management only. CXR on 10/3/24 showed new LLL infiltrate, started on Zosyn. On 10/4/24, pt desatted on supplemental O2, CTA chest found massive PE in R main bronchus with pulm trunk dilatation. BLE duplex also found b/l LE DVTs (L>R). Started on hep gtt and transferred to [facility] for embolectomy evaluation.      #Acute hypoxemic respiratory failure  #R main bronchus PE, b/l LE DVTs  #?PNA  #CVA  #Dysphagia  #R humeral fx  #Chronic aflutter  #DM2  #EtOH/tobacco use disorder    - on RA  - completed 7 day course of zosyn   - c/w amio and metoprolol (was on at OSH)  - c/w advair BID and duonebs q6h PRN  - c/w low ISS for now, monitor FS  - monitor on telemetry  - MRI head showing acute/subacute infarcts; neuro recs appreciated; f/u MG labs   - statin, thiamine  - TTE no acute findings   - speech and swallow, NPO, FEES -> did not pass study   - transitioned to eliquis 10mg BID for 7 days (including heparin drip days); now back on heparin drip for planned PEG tube placement; when ready to switch back to eliquis, will be on 5mg BID  - ortho - outpatient f/u   - started on NG feeds 10/5 but patient pulled out NGT 10/6 overnight; ENT replaced NGT on 10/7. Resume NGT feeds; dietitian recs noted  - plan for PEG placement to be done, pending CT abd to eval for prior abdominal surgery findings; if ok, will have to touch base with GI to see if PEG can be placed tomorrow?; if so, will keep NPO after MN and hold heparin drip at 6am unless otherwise told by GI team     - cardio recs appreciated; no further testing needed for PEG procedure    - eventual plan for ROSALBA     Discussed with sister at bedside.     Rest as above. Discussed with HS.

## 2024-10-15 NOTE — PROGRESS NOTE ADULT - PROBLEM SELECTOR PLAN 7
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  - per neuro, ordered myasthenia gravis labs, outpatient f/u, can consider ENT/GI consult  - outpatient f/u  - s/p FEES 10/10, recommended to remain NPO  - GI consulted for PEG placement, cardiac clearance 10/14, pending CT noncontrast 10/15, PEG placement planned for 10/16

## 2024-10-16 LAB
ANION GAP SERPL CALC-SCNC: 15 MMOL/L — SIGNIFICANT CHANGE UP (ref 5–17)
APTT BLD: 76.1 SEC — HIGH (ref 24.5–35.6)
BLD GP AB SCN SERPL QL: NEGATIVE — SIGNIFICANT CHANGE UP
BUN SERPL-MCNC: 15 MG/DL — SIGNIFICANT CHANGE UP (ref 7–23)
CALCIUM SERPL-MCNC: 10.1 MG/DL — SIGNIFICANT CHANGE UP (ref 8.4–10.5)
CHLORIDE SERPL-SCNC: 96 MMOL/L — SIGNIFICANT CHANGE UP (ref 96–108)
CO2 SERPL-SCNC: 26 MMOL/L — SIGNIFICANT CHANGE UP (ref 22–31)
CREAT SERPL-MCNC: 0.77 MG/DL — SIGNIFICANT CHANGE UP (ref 0.5–1.3)
EGFR: 98 ML/MIN/1.73M2 — SIGNIFICANT CHANGE UP
GLUCOSE BLDC GLUCOMTR-MCNC: 100 MG/DL — HIGH (ref 70–99)
GLUCOSE BLDC GLUCOMTR-MCNC: 100 MG/DL — HIGH (ref 70–99)
GLUCOSE BLDC GLUCOMTR-MCNC: 118 MG/DL — HIGH (ref 70–99)
GLUCOSE BLDC GLUCOMTR-MCNC: 119 MG/DL — HIGH (ref 70–99)
GLUCOSE BLDC GLUCOMTR-MCNC: 124 MG/DL — HIGH (ref 70–99)
GLUCOSE BLDC GLUCOMTR-MCNC: 127 MG/DL — HIGH (ref 70–99)
GLUCOSE SERPL-MCNC: 127 MG/DL — HIGH (ref 70–99)
HCT VFR BLD CALC: 34.8 % — LOW (ref 39–50)
HGB BLD-MCNC: 11 G/DL — LOW (ref 13–17)
INR BLD: 1.17 RATIO — HIGH (ref 0.85–1.16)
MAGNESIUM SERPL-MCNC: 2.1 MG/DL — SIGNIFICANT CHANGE UP (ref 1.6–2.6)
MCHC RBC-ENTMCNC: 28.8 PG — SIGNIFICANT CHANGE UP (ref 27–34)
MCHC RBC-ENTMCNC: 31.6 GM/DL — LOW (ref 32–36)
MCV RBC AUTO: 91.1 FL — SIGNIFICANT CHANGE UP (ref 80–100)
NRBC # BLD: 0 /100 WBCS — SIGNIFICANT CHANGE UP (ref 0–0)
PHOSPHATE SERPL-MCNC: 5 MG/DL — HIGH (ref 2.5–4.5)
PLATELET # BLD AUTO: 208 K/UL — SIGNIFICANT CHANGE UP (ref 150–400)
POTASSIUM SERPL-MCNC: 4 MMOL/L — SIGNIFICANT CHANGE UP (ref 3.5–5.3)
POTASSIUM SERPL-SCNC: 4 MMOL/L — SIGNIFICANT CHANGE UP (ref 3.5–5.3)
PROTHROM AB SERPL-ACNC: 13.4 SEC — SIGNIFICANT CHANGE UP (ref 9.9–13.4)
RBC # BLD: 3.82 M/UL — LOW (ref 4.2–5.8)
RBC # FLD: 13.8 % — SIGNIFICANT CHANGE UP (ref 10.3–14.5)
RH IG SCN BLD-IMP: POSITIVE — SIGNIFICANT CHANGE UP
RH IG SCN BLD-IMP: POSITIVE — SIGNIFICANT CHANGE UP
SODIUM SERPL-SCNC: 137 MMOL/L — SIGNIFICANT CHANGE UP (ref 135–145)
WBC # BLD: 6.95 K/UL — SIGNIFICANT CHANGE UP (ref 3.8–10.5)
WBC # FLD AUTO: 6.95 K/UL — SIGNIFICANT CHANGE UP (ref 3.8–10.5)

## 2024-10-16 PROCEDURE — 99232 SBSQ HOSP IP/OBS MODERATE 35: CPT | Mod: 25

## 2024-10-16 PROCEDURE — 43246 EGD PLACE GASTROSTOMY TUBE: CPT | Mod: GC

## 2024-10-16 PROCEDURE — 99232 SBSQ HOSP IP/OBS MODERATE 35: CPT | Mod: GC

## 2024-10-16 DEVICE — KIT ENDO SAFTEY PEG PULL STD 20 FR ENDOVIVE: Type: IMPLANTABLE DEVICE | Status: FUNCTIONAL

## 2024-10-16 RX ORDER — ACETAMINOPHEN 500 MG
1000 TABLET ORAL ONCE
Refills: 0 | Status: COMPLETED | OUTPATIENT
Start: 2024-10-16 | End: 2024-10-16

## 2024-10-16 RX ORDER — SODIUM CHLORIDE 9 MG/ML
500 INJECTION, SOLUTION INTRAMUSCULAR; INTRAVENOUS; SUBCUTANEOUS
Refills: 0 | Status: DISCONTINUED | OUTPATIENT
Start: 2024-10-16 | End: 2024-10-17

## 2024-10-16 RX ADMIN — Medication 25 MILLIGRAM(S): at 05:32

## 2024-10-16 RX ADMIN — PANTOPRAZOLE SODIUM 40 MILLIGRAM(S): 40 TABLET, DELAYED RELEASE ORAL at 18:56

## 2024-10-16 RX ADMIN — FLUTICASONE PROPIONATE AND SALMETEROL XINAFOATE 1 DOSE(S): 230; 21 AEROSOL, METERED RESPIRATORY (INHALATION) at 18:56

## 2024-10-16 RX ADMIN — FLUTICASONE PROPIONATE AND SALMETEROL XINAFOATE 1 DOSE(S): 230; 21 AEROSOL, METERED RESPIRATORY (INHALATION) at 05:32

## 2024-10-16 RX ADMIN — Medication 1000 MILLIGRAM(S): at 23:20

## 2024-10-16 RX ADMIN — Medication 200 MILLIGRAM(S): at 05:32

## 2024-10-16 RX ADMIN — Medication 400 MILLIGRAM(S): at 22:39

## 2024-10-16 RX ADMIN — Medication 10 MILLIGRAM(S): at 05:32

## 2024-10-16 RX ADMIN — Medication 40 MILLIGRAM(S): at 23:22

## 2024-10-16 NOTE — PROGRESS NOTE ADULT - ATTENDING COMMENTS
69 year old male, with PMH of HTN, DM2, ?CVA/TIA, Aflutter, CAD s/p CABG (2018), EtOH use disorder (two 40oz beers daily), current smoker (8 cigs/day), L total hip arthroplasty, initially admitted to [facility] (9/14/24-10/4/24) after mech fall c/b R prox humeral fx and EtOH withdrawal. Was on CIWA with Librium/Ativan PRN. TTE on 9/15/24 showed LVEF 60-65%, grad I DD, and an aortic valve prosthesis with normal function. Course c/b AMS, acute hypoxemic/hypercapnic respiratory, and agonal breathing on 9/18/24. Was initially given flumazenil x2 with some improvement, but was intubated for airway protection given excessive secretions and transferred to MICU 9/18/24. CTA chest was somewhat limited study but did not find PE at the time, noted RUL GGOs "suspicious for atypical or viral infection." CTH showed subacute and chronic SDHs as well as chronic lacunar infarct within L lentiform nucleus. Repeat CT showed stable SDHs. Extubated on 9/19/24. Pt was downgraded to Medicine floors on 9/21/24. Noted to have loss of voice and dysphagia. Evaluated by S+S, found dysphagia on video flouroscopy to all textures and recommended for PEG tube, but pt refused and opted for NGT placement. Ortho surgery that was planned for humeral fx was cancelled and ultimately recommended for conservative management only. CXR on 10/3/24 showed new LLL infiltrate, started on Zosyn. On 10/4/24, pt desatted on supplemental O2, CTA chest found massive PE in R main bronchus with pulm trunk dilatation. BLE duplex also found b/l LE DVTs (L>R). Started on hep gtt and transferred to [facility] for embolectomy evaluation.      #Acute hypoxemic respiratory failure  #R main bronchus PE, b/l LE DVTs  #?PNA  #CVA  #Dysphagia  #R humeral fx  #Chronic aflutter  #DM2  #EtOH/tobacco use disorder    - on 2L; wean O2 as tolerated   - completed 7 day course of zosyn   - c/w amio and metoprolol (was on at OSH)  - c/w advair BID and duonebs q6h PRN  - c/w low ISS for now, monitor FS  - monitor on telemetry  - MRI head showing acute/subacute infarcts; neuro recs appreciated; f/u MG labs   - statin, thiamine  - TTE no acute findings   - speech and swallow, NPO, FEES -> did not pass study   - transitioned to eliquis 10mg BID for 7 days (including heparin drip days); now back on heparin drip for planned PEG tube placement; when ready to switch back to eliquis, will be on 5mg BID  - ortho - outpatient f/u   - started on NG feeds 10/5 but patient pulled out NGT 10/6 overnight; ENT replaced NGT on 10/7. Resume NGT feeds; dietitian recs noted  - was planned for PEG placement today but GI team not able to reach out to sister for consent; will be rescheduled for now pending consent  - cardio recs appreciated; no further testing needed for PEG procedure    - eventual plan for ROSALBA     Rest as above. Discussed with HS.

## 2024-10-16 NOTE — PROGRESS NOTE ADULT - PROBLEM SELECTOR PLAN 4
- Initially presented to [facility] on 9/14 for a mechanical fall after he tripped and fell onto his R shoulder  - XR showing R proximal humerus fracture, ORIF with Orthopedic Surgery at [facility] cancelled due to MICU course  > Orthopedic surgery on 10/7 said do outpt surgery.

## 2024-10-16 NOTE — PROGRESS NOTE ADULT - PROBLEM SELECTOR PLAN 10
DVT Ppx: heparin gtt  Diet: TF  Dispo: TBD DVT Ppx: holding heparin 10/16  Diet: TF (holding 10/16)  Dispo: TBD

## 2024-10-16 NOTE — PROGRESS NOTE ADULT - PROBLEM SELECTOR PLAN 1
- Hospital course at [facility] c/b AHRF  - CTA chest (10/4) -> Large PE of R main pulmonary artery  - US of b/l LEs -> R peroneal vein and L popliteal, tibial trunk, peroneal, and posterior tibial DVTs  - EKG showing evidence of right heart strain, S1Q3T3 changes, T wave depressions in precordial leads; not present on EKG from 9/15  - TTE (9/15) unremarkable  - Troponin 92, BNP 7168 on transfer  > CTH not acutely concerning  > C/w heparin gtt w/ target PTT-> 0.4-0.6 Repeat TTE not concerning  > Vascular cardiology consulted rec heparin for now and eventual transition to DOAC  - Neurosurg consulted for AC management, they said okay to PTT 60-80  - Transitioned to DOAC loading eliquis but transitioned back to heparin pending PEG placement (need to stop 6 hours before procedure and obtain cardiac clearance on Monday)'  - heparin gtt 14 --> 10 --> 11 ml/hr for aPTT 53.8 (goal 60-80)

## 2024-10-16 NOTE — CHART NOTE - NSCHARTNOTEFT_GEN_A_CORE
GI addendum    Sister called back, said her phone battery . She's agreeable to patient having PEG, risks/benefits explained. Will attempt to bring patient back for EGD/PEG.    Silviano (Cong Jorge L) MD CAMPOS King e-mail: [email or web address]

## 2024-10-16 NOTE — PRE-ANESTHESIA EVALUATION ADULT - NSANTHPMHFT_GEN_ALL_CORE
Mr. Jarrett Overton is a 69-year-old w/ PMH of HTN, T2DM, CVA, A. flutter, CAD s/p CABG x3 (~2018), and alcohol use disorder who initially presented to [facility] on 9/14 for a mechanical fall. On 10/4, patient was found to be hypoxic with subsequent CTA chest showing large PE of R main pulmonary artery. US of b/l LEs was further significant for b/l DVTs in the R peroneal vein and L popliteal, tibial trunk, peroneal, and posterior tibial veins. Patient was transferred to [facility] for further management of PE and tx of superimposed PNA, now pending PEG placement, dispo ROSALBA.

## 2024-10-16 NOTE — PROGRESS NOTE ADULT - SUBJECTIVE AND OBJECTIVE BOX
Chief Complaint:  Patient is a 69y old  Male who presents with a chief complaint of Pulmonary Embolism (16 Oct 2024 06:56)      Interval Events: Patient  brought to endoscopy for PEG, but unable to reach family despite multiple attempts and trying all numbers available in chart. Sister (HCP)'s phone goes straight to [facility].     Allergies:  No Known Allergies      Hospital Medications:  albuterol/ipratropium for Nebulization 3 milliLiter(s) Nebulizer every 6 hours PRN  aMIOdarone    Tablet 200 milliGRAM(s) Oral daily  amLODIPine   Tablet 10 milliGRAM(s) Oral daily  atorvastatin 40 milliGRAM(s) Oral at bedtime  dextrose 5%. 1000 milliLiter(s) IV Continuous <Continuous>  dextrose 5%. 1000 milliLiter(s) IV Continuous <Continuous>  dextrose 50% Injectable 25 Gram(s) IV Push once  dextrose 50% Injectable 12.5 Gram(s) IV Push once  dextrose 50% Injectable 25 Gram(s) IV Push once  dextrose Oral Gel 15 Gram(s) Oral once  fluticasone propionate/ salmeterol 250-50 MICROgram(s) Diskus 1 Dose(s) Inhalation two times a day  glucagon  Injectable 1 milliGRAM(s) IntraMuscular once  insulin lispro (ADMELOG) corrective regimen sliding scale   SubCutaneous at bedtime  insulin lispro (ADMELOG) corrective regimen sliding scale   SubCutaneous every 6 hours  metoprolol tartrate 25 milliGRAM(s) Oral two times a day  pantoprazole  Injectable 40 milliGRAM(s) IV Push daily  polyethylene glycol 3350 17 Gram(s) Oral daily  sodium chloride 0.9%. 500 milliLiter(s) IV Continuous <Continuous>  thiamine 100 milliGRAM(s) Oral daily      PMHX/PSHX:  HTN (hypertension)    T2DM (type 2 diabetes mellitus)    CVA (cerebrovascular accident)    Atrial flutter    CAD (coronary artery disease)    History of alcohol use disorder    History of repair of left hip joint        Family history:      ROS:     General:  No wt loss, fevers, chills, night sweats, fatigue,   Eyes:  Good vision, no reported pain  ENT:  No sore throat, pain, runny nose, dysphagia  CV:  No pain, palpitations, hypo/hypertension  Resp:  No dyspnea, cough, tachypnea, wheezing  GI:  See HPI  :  No pain, bleeding, incontinence, nocturia  Muscle:  No pain, weakness  Neuro:  No weakness, tingling, memory problems  Skin:  No rash, edema      PHYSICAL EXAM:   Vital Signs:  Vital Signs Last 24 Hrs  T(C): 36.3 (16 Oct 2024 10:45), Max: 37.1 (15 Oct 2024 20:22)  T(F): 97.3 (16 Oct 2024 10:45), Max: 98.8 (15 Oct 2024 20:22)  HR: 69 (16 Oct 2024 12:35) (68 - 73)  BP: 113/77 (16 Oct 2024 12:35) (98/71 - 121/81)  BP(mean): --  RR: 18 (16 Oct 2024 12:35) (18 - 19)  SpO2: 96% (16 Oct 2024 12:35) (86% - 100%)    Parameters below as of 16 Oct 2024 12:35  Patient On (Oxygen Delivery Method): nasal cannula  O2 Flow (L/min): 2    Daily     Daily     GENERAL:  Appears stated age   HEENT:  NC/AT, +NGT  CHEST:  Full & symmetric excursion, no increased effort, breath sounds clear  HEART:  Regular rhythm, S1, S2  ABDOMEN:  Soft, non-tender, non-distended, normoactive bowel sounds  EXTREMITIES:  no cyanosis,clubbing or edema  SKIN:  No rash/erythema   NEURO:  Alert, oriented x 2    LABS:                        11.0   6.95  )-----------( 208      ( 16 Oct 2024 04:34 )             34.8     10-16    137  |  96  |  15  ----------------------------<  127[H]  4.0   |  26  |  0.77    Ca    10.1      16 Oct 2024 04:34  Phos  5.0     10-16  Mg     2.1     10-16    TPro  7.8  /  Alb  4.1  /  TBili  0.3  /  DBili  x   /  AST  20  /  ALT  18  /  AlkPhos  101  10-15    LIVER FUNCTIONS - ( 15 Oct 2024 06:04 )  Alb: 4.1 g/dL / Pro: 7.8 g/dL / ALK PHOS: 101 U/L / ALT: 18 U/L / AST: 20 U/L / GGT: x           PT/INR - ( 16 Oct 2024 04:34 )   PT: 13.4 sec;   INR: 1.17 ratio         PTT - ( 16 Oct 2024 04:34 )  PTT:76.1 sec  Urinalysis Basic - ( 16 Oct 2024 04:34 )    Color: x / Appearance: x / SG: x / pH: x  Gluc: 127 mg/dL / Ketone: x  / Bili: x / Urobili: x   Blood: x / Protein: x / Nitrite: x   Leuk Esterase: x / RBC: x / WBC x   Sq Epi: x / Non Sq Epi: x / Bacteria: x

## 2024-10-16 NOTE — PROGRESS NOTE ADULT - SUBJECTIVE AND OBJECTIVE BOX
INTERVAL HPI/OVERNIGHT EVENTS:  Pt seen and examined at bedside. No acute overnight events or complaints.    Brief Daily Plan:  -    VITAL SIGNS:  T(F): 98.4 (10-16-24 @ 04:00)  HR: 68 (10-16-24 @ 04:00)  BP: 121/81 (10-16-24 @ 04:00)  RR: 18 (10-16-24 @ 04:00)  SpO2: 100% (10-16-24 @ 04:00)  Wt(kg): --    PHYSICAL EXAM:    CONSTITUTIONAL: NAD, sleeping comfortably, AAOx1 ("bank," "2004)  HEENT: MMM  RESPIRATORY: Normal respiratory effort; lungs are clear to auscultation bilaterally  CARDIOVASCULAR: Regular rate and rhythm, normal S1 and S2, +systolic murmur  ABDOMEN: Nontender to palpation, normoactive bowel sounds, no rebound/guarding  MUSCULOSKELETAL:  Moving limbs spontaneously; RUE in sling; No lower extremity edema  PSYCH: Affect appropriate, pleasant    MEDICATIONS  (STANDING):  aMIOdarone    Tablet 200 milliGRAM(s) Oral daily  amLODIPine   Tablet 10 milliGRAM(s) Oral daily  atorvastatin 40 milliGRAM(s) Oral at bedtime  dextrose 5%. 1000 milliLiter(s) (50 mL/Hr) IV Continuous <Continuous>  dextrose 5%. 1000 milliLiter(s) (100 mL/Hr) IV Continuous <Continuous>  dextrose 50% Injectable 12.5 Gram(s) IV Push once  dextrose 50% Injectable 25 Gram(s) IV Push once  dextrose 50% Injectable 25 Gram(s) IV Push once  dextrose Oral Gel 15 Gram(s) Oral once  fluticasone propionate/ salmeterol 250-50 MICROgram(s) Diskus 1 Dose(s) Inhalation two times a day  glucagon  Injectable 1 milliGRAM(s) IntraMuscular once  insulin lispro (ADMELOG) corrective regimen sliding scale   SubCutaneous at bedtime  insulin lispro (ADMELOG) corrective regimen sliding scale   SubCutaneous every 6 hours  metoprolol tartrate 25 milliGRAM(s) Oral two times a day  pantoprazole  Injectable 40 milliGRAM(s) IV Push daily  polyethylene glycol 3350 17 Gram(s) Oral daily  thiamine 100 milliGRAM(s) Oral daily    MEDICATIONS  (PRN):  albuterol/ipratropium for Nebulization 3 milliLiter(s) Nebulizer every 6 hours PRN Shortness of Breath and/or Wheezing      Allergies    No Known Allergies    Intolerances        LABS:                        11.0   6.95  )-----------( 208      ( 16 Oct 2024 04:34 )             34.8     10-16    137  |  96  |  15  ----------------------------<  127[H]  4.0   |  26  |  0.77    Ca    10.1      16 Oct 2024 04:34  Phos  5.0     10-16  Mg     2.1     10-16    TPro  7.8  /  Alb  4.1  /  TBili  0.3  /  DBili  x   /  AST  20  /  ALT  18  /  AlkPhos  101  10-15    PT/INR - ( 16 Oct 2024 04:34 )   PT: 13.4 sec;   INR: 1.17 ratio         PTT - ( 16 Oct 2024 04:34 )  PTT:76.1 sec  Urinalysis Basic - ( 16 Oct 2024 04:34 )    Color: x / Appearance: x / SG: x / pH: x  Gluc: 127 mg/dL / Ketone: x  / Bili: x / Urobili: x   Blood: x / Protein: x / Nitrite: x   Leuk Esterase: x / RBC: x / WBC x   Sq Epi: x / Non Sq Epi: x / Bacteria: x        RADIOLOGY & ADDITIONAL TESTS:  Reviewed  ******************  Authored By: Henok Nava MD, PGY1  MS Teams Preferred  ******************   INTERVAL HPI/OVERNIGHT EVENTS:  Pt seen and examined at bedside. No acute overnight events or complaints.    Brief Daily Plan:  - GI will reach out to sister for consent  - PEG tube will be placed today  - f/u GI when to resume diet + eliquis    VITAL SIGNS:  T(F): 98.4 (10-16-24 @ 04:00)  HR: 68 (10-16-24 @ 04:00)  BP: 121/81 (10-16-24 @ 04:00)  RR: 18 (10-16-24 @ 04:00)  SpO2: 100% (10-16-24 @ 04:00)  Wt(kg): --    PHYSICAL EXAM:    CONSTITUTIONAL: NAD, sleeping comfortably, AAOx1 ("bank," "2004)  HEENT: MMM  RESPIRATORY: Normal respiratory effort; lungs are clear to auscultation bilaterally  CARDIOVASCULAR: Regular rate and rhythm, normal S1 and S2, +systolic murmur  ABDOMEN: Nontender to palpation, normoactive bowel sounds, no rebound/guarding  MUSCULOSKELETAL:  Moving limbs spontaneously; RUE in sling; No lower extremity edema  PSYCH: Affect appropriate, pleasant    MEDICATIONS  (STANDING):  aMIOdarone    Tablet 200 milliGRAM(s) Oral daily  amLODIPine   Tablet 10 milliGRAM(s) Oral daily  atorvastatin 40 milliGRAM(s) Oral at bedtime  dextrose 5%. 1000 milliLiter(s) (50 mL/Hr) IV Continuous <Continuous>  dextrose 5%. 1000 milliLiter(s) (100 mL/Hr) IV Continuous <Continuous>  dextrose 50% Injectable 12.5 Gram(s) IV Push once  dextrose 50% Injectable 25 Gram(s) IV Push once  dextrose 50% Injectable 25 Gram(s) IV Push once  dextrose Oral Gel 15 Gram(s) Oral once  fluticasone propionate/ salmeterol 250-50 MICROgram(s) Diskus 1 Dose(s) Inhalation two times a day  glucagon  Injectable 1 milliGRAM(s) IntraMuscular once  insulin lispro (ADMELOG) corrective regimen sliding scale   SubCutaneous at bedtime  insulin lispro (ADMELOG) corrective regimen sliding scale   SubCutaneous every 6 hours  metoprolol tartrate 25 milliGRAM(s) Oral two times a day  pantoprazole  Injectable 40 milliGRAM(s) IV Push daily  polyethylene glycol 3350 17 Gram(s) Oral daily  thiamine 100 milliGRAM(s) Oral daily    MEDICATIONS  (PRN):  albuterol/ipratropium for Nebulization 3 milliLiter(s) Nebulizer every 6 hours PRN Shortness of Breath and/or Wheezing      Allergies    No Known Allergies    Intolerances        LABS:                        11.0   6.95  )-----------( 208      ( 16 Oct 2024 04:34 )             34.8     10-16    137  |  96  |  15  ----------------------------<  127[H]  4.0   |  26  |  0.77    Ca    10.1      16 Oct 2024 04:34  Phos  5.0     10-16  Mg     2.1     10-16    TPro  7.8  /  Alb  4.1  /  TBili  0.3  /  DBili  x   /  AST  20  /  ALT  18  /  AlkPhos  101  10-15    PT/INR - ( 16 Oct 2024 04:34 )   PT: 13.4 sec;   INR: 1.17 ratio         PTT - ( 16 Oct 2024 04:34 )  PTT:76.1 sec  Urinalysis Basic - ( 16 Oct 2024 04:34 )    Color: x / Appearance: x / SG: x / pH: x  Gluc: 127 mg/dL / Ketone: x  / Bili: x / Urobili: x   Blood: x / Protein: x / Nitrite: x   Leuk Esterase: x / RBC: x / WBC x   Sq Epi: x / Non Sq Epi: x / Bacteria: x    RADIOLOGY & ADDITIONAL TESTS:  Reviewed  ******************  Authored By: Henok Nava MD, PGY1  MS Teams Preferred  ******************

## 2024-10-16 NOTE — PROGRESS NOTE ADULT - ASSESSMENT
Mr. Jarrett Overton is a 69-year-old w/ PMH of HTN, T2DM, CVA, A. flutter, CAD s/p CABG x3 (~2018), and alcohol use disorder who initially presented to [facility] on 9/14 for a mechanical fall. On 10/4, patient was found to be hypoxic with subsequent CTA chest showing large PE of R main pulmonary artery. US of b/l LEs was further significant for b/l DVTs in the R peroneal vein and L popliteal, tibial trunk, peroneal, and posterior tibial veins. Patient was transferred to [facility] for further management of PE and tx of superimposed PNA, now pending PEG placement, dispo ROSALBA.

## 2024-10-16 NOTE — PROGRESS NOTE ADULT - PROBLEM SELECTOR PLAN 2
# RESOLVED    Hospital course at [facility] c/b AHRF s/p intubation on 9/18 for likely benzodiazepine overdose, extubated on 9/19. Diffuse rhonchi with copious secretions on exam. CTA chest (10/4) -> Large PE of R main pulmonary artery, scattered GGOs and interstitial changes predominantly in R and L lower lobes. Likely PE +/- superimposed aspiration PNA s/p empiric Zosyn for 7d (10/5-10/12).    - C/w management of PE, as above  - On RA

## 2024-10-16 NOTE — PROGRESS NOTE ADULT - ASSESSMENT
70 yo M with PMH of CAD s/p CABG, CVA, HTN, and T2DM presenting for fall to [facility], found to have alcohol withdrawal with course c/b acute hypercapnic respiratory failure s/p extubation and b/l PEs and DVT on heparin gtt. GI consulted for dysphagia.     Impression:  #Dysphagia - oropharyngeal dysphagia failing FEEST    Plan:  -Unable to reach family for consent for PEG  -Resume PEG feeds  -Resume anticoagulation  -We will await callback from sister, and reschedule PEG once we are able to reach family    Silviano (Cong Coats) MD CAMPOS King e-mail: [email or web address]

## 2024-10-16 NOTE — PROGRESS NOTE ADULT - PROBLEM SELECTOR PLAN 3
- US of b/l LEs at [facility] -> R peroneal vein and L popliteal, tibial trunk, peroneal, and posterior tibial DVTs  > Repeat US of b/l LEs showing mulitple DVTs  > C/w heparin as above - US of b/l LEs at [facility] -> R peroneal vein and L popliteal, tibial trunk, peroneal, and posterior tibial DVTs  > Repeat US of b/l LEs showing mulitple DVTs  > holding heparin 10/16 for PEG tube  - holding feeds

## 2024-10-16 NOTE — PRE PROCEDURE NOTE - PRE PROCEDURE EVALUATION
Attending Physician: Cong King MD    Procedure: EGD/PEG    Indication for Procedure: DYsphagia  ________________________________________________________  PAST MEDICAL & SURGICAL HISTORY:  HTN (hypertension)      T2DM (type 2 diabetes mellitus)      CVA (cerebrovascular accident)      Atrial flutter      CAD (coronary artery disease)      History of alcohol use disorder      History of repair of left hip joint        ALLERGIES:  No Known Allergies    HOME MEDICATIONS:  acetaminophen 325 mg oral tablet: 2 tab(s) orally every 6 hours as needed for  mild pain  amiodarone 200 mg oral tablet: 1 tab(s) orally once a day  amLODIPine 5 mg oral tablet: 1 tab(s) orally once a day  budesonide-formoterol 160 mcg-4.5 mcg/inh inhalation aerosol: 2 puff(s) inhaled 2 times a day  cyanocobalamin 250 mcg oral tablet: 1 tab(s) orally once a day  ergocalciferol 1.25 mg (50,000 intl units) oral tablet: 1 tab(s) orally once a week  esomeprazole 20 mg oral delayed release capsule: 1 cap(s) orally once a day  heparin 25,000 units/mL injectable solution: injectable 18 u/kg/hr  Insulin Aspart: sliding scale  metoprolol tartrate 25 mg oral tablet: 1 tab(s) orally every 12 hours  MiraLax oral powder for reconstitution: 17 gram(s) orally once a day  morphine 1 mg/mL injectable solution: 1 milligram(s) intravenously every 6 hours as needed for  moderate pain  thiamine 100 mg oral tablet: 1 tab(s) orally once a day  Zosyn 4 g-0.5 g/100 mL intravenous solution: 4.5 gram(s) intravenously every 8 hours    AICD/PPM: [ ] yes   [ ] no    PERTINENT LAB DATA:                        11.0   6.95  )-----------( 208      ( 16 Oct 2024 04:34 )             34.8     10-16    137  |  96  |  15  ----------------------------<  127[H]  4.0   |  26  |  0.77    Ca    10.1      16 Oct 2024 04:34  Phos  5.0     10-16  Mg     2.1     10-16    TPro  7.8  /  Alb  4.1  /  TBili  0.3  /  DBili  x   /  AST  20  /  ALT  18  /  AlkPhos  101  10-15    PT/INR - ( 16 Oct 2024 04:34 )   PT: 13.4 sec;   INR: 1.17 ratio         PTT - ( 16 Oct 2024 04:34 )  PTT:76.1 sec            PHYSICAL EXAMINATION:    T(C): 36.9  HR: 68  BP: 121/81  RR: 18  SpO2: 100%    Constitutional: NAD  HEENT: PERRLA, EOMI,    Neck:  No JVD  Respiratory: CTAB/L  Cardiovascular: S1 and S2  Gastrointestinal: BS+, soft, NT/ND  Extremities: No peripheral edema  Neurological: A/O x 2   Psychiatric: Normal mood, normal affect  Skin: No rashes    ASA Class: I [ ]  II [ ]  III [x ]  IV [ ]    COMMENTS:    The patient is a suitable candidate for the planned procedure unless box checked [ ]  No, explain:

## 2024-10-17 LAB
ANION GAP SERPL CALC-SCNC: 13 MMOL/L — SIGNIFICANT CHANGE UP (ref 5–17)
BASOPHILS # BLD AUTO: 0.03 K/UL — SIGNIFICANT CHANGE UP (ref 0–0.2)
BASOPHILS NFR BLD AUTO: 0.3 % — SIGNIFICANT CHANGE UP (ref 0–2)
BUN SERPL-MCNC: 13 MG/DL — SIGNIFICANT CHANGE UP (ref 7–23)
CALCIUM SERPL-MCNC: 9.7 MG/DL — SIGNIFICANT CHANGE UP (ref 8.4–10.5)
CHLORIDE SERPL-SCNC: 98 MMOL/L — SIGNIFICANT CHANGE UP (ref 96–108)
CO2 SERPL-SCNC: 25 MMOL/L — SIGNIFICANT CHANGE UP (ref 22–31)
CREAT SERPL-MCNC: 0.71 MG/DL — SIGNIFICANT CHANGE UP (ref 0.5–1.3)
EGFR: 101 ML/MIN/1.73M2 — SIGNIFICANT CHANGE UP
EOSINOPHIL # BLD AUTO: 0.1 K/UL — SIGNIFICANT CHANGE UP (ref 0–0.5)
EOSINOPHIL NFR BLD AUTO: 1.1 % — SIGNIFICANT CHANGE UP (ref 0–6)
GLUCOSE BLDC GLUCOMTR-MCNC: 141 MG/DL — HIGH (ref 70–99)
GLUCOSE BLDC GLUCOMTR-MCNC: 150 MG/DL — HIGH (ref 70–99)
GLUCOSE BLDC GLUCOMTR-MCNC: 98 MG/DL — SIGNIFICANT CHANGE UP (ref 70–99)
GLUCOSE SERPL-MCNC: 345 MG/DL — HIGH (ref 70–99)
HCT VFR BLD CALC: 31.7 % — LOW (ref 39–50)
HGB BLD-MCNC: 9.9 G/DL — LOW (ref 13–17)
IMM GRANULOCYTES NFR BLD AUTO: 0.4 % — SIGNIFICANT CHANGE UP (ref 0–0.9)
LYMPHOCYTES # BLD AUTO: 1.33 K/UL — SIGNIFICANT CHANGE UP (ref 1–3.3)
LYMPHOCYTES # BLD AUTO: 14.3 % — SIGNIFICANT CHANGE UP (ref 13–44)
MAGNESIUM SERPL-MCNC: 1.8 MG/DL — SIGNIFICANT CHANGE UP (ref 1.6–2.6)
MCHC RBC-ENTMCNC: 29.4 PG — SIGNIFICANT CHANGE UP (ref 27–34)
MCHC RBC-ENTMCNC: 31.2 GM/DL — LOW (ref 32–36)
MCV RBC AUTO: 94.1 FL — SIGNIFICANT CHANGE UP (ref 80–100)
MONOCYTES # BLD AUTO: 0.83 K/UL — SIGNIFICANT CHANGE UP (ref 0–0.9)
MONOCYTES NFR BLD AUTO: 8.9 % — SIGNIFICANT CHANGE UP (ref 2–14)
NEUTROPHILS # BLD AUTO: 6.97 K/UL — SIGNIFICANT CHANGE UP (ref 1.8–7.4)
NEUTROPHILS NFR BLD AUTO: 75 % — SIGNIFICANT CHANGE UP (ref 43–77)
NRBC # BLD: 0 /100 WBCS — SIGNIFICANT CHANGE UP (ref 0–0)
PHOSPHATE SERPL-MCNC: 4 MG/DL — SIGNIFICANT CHANGE UP (ref 2.5–4.5)
PLATELET # BLD AUTO: 159 K/UL — SIGNIFICANT CHANGE UP (ref 150–400)
POTASSIUM SERPL-MCNC: 3.9 MMOL/L — SIGNIFICANT CHANGE UP (ref 3.5–5.3)
POTASSIUM SERPL-SCNC: 3.9 MMOL/L — SIGNIFICANT CHANGE UP (ref 3.5–5.3)
RBC # BLD: 3.37 M/UL — LOW (ref 4.2–5.8)
RBC # FLD: 13.6 % — SIGNIFICANT CHANGE UP (ref 10.3–14.5)
SODIUM SERPL-SCNC: 136 MMOL/L — SIGNIFICANT CHANGE UP (ref 135–145)
WBC # BLD: 9.3 K/UL — SIGNIFICANT CHANGE UP (ref 3.8–10.5)
WBC # FLD AUTO: 9.3 K/UL — SIGNIFICANT CHANGE UP (ref 3.8–10.5)

## 2024-10-17 PROCEDURE — 99232 SBSQ HOSP IP/OBS MODERATE 35: CPT | Mod: GC

## 2024-10-17 PROCEDURE — 99221 1ST HOSP IP/OBS SF/LOW 40: CPT

## 2024-10-17 RX ORDER — ACETAMINOPHEN 500 MG
650 TABLET ORAL ONCE
Refills: 0 | Status: COMPLETED | OUTPATIENT
Start: 2024-10-17 | End: 2024-10-17

## 2024-10-17 RX ORDER — MAGNESIUM SULFATE IN 0.9% NACL 2 G/50 ML
2 INTRAVENOUS SOLUTION, PIGGYBACK (ML) INTRAVENOUS ONCE
Refills: 0 | Status: COMPLETED | OUTPATIENT
Start: 2024-10-17 | End: 2024-10-17

## 2024-10-17 RX ORDER — APIXABAN 5 MG/1
5 TABLET, FILM COATED ORAL EVERY 12 HOURS
Refills: 0 | Status: DISCONTINUED | OUTPATIENT
Start: 2024-10-17 | End: 2024-10-24

## 2024-10-17 RX ORDER — HEPARIN SODIUM 10000 [USP'U]/ML
1100 INJECTION INTRAVENOUS; SUBCUTANEOUS
Qty: 25000 | Refills: 0 | Status: DISCONTINUED | OUTPATIENT
Start: 2024-10-17 | End: 2024-10-17

## 2024-10-17 RX ORDER — APIXABAN 5 MG/1
5 TABLET, FILM COATED ORAL EVERY 12 HOURS
Refills: 0 | Status: DISCONTINUED | OUTPATIENT
Start: 2024-10-17 | End: 2024-10-17

## 2024-10-17 RX ORDER — HEPARIN SODIUM 10000 [USP'U]/ML
11 INJECTION INTRAVENOUS; SUBCUTANEOUS
Qty: 25000 | Refills: 0 | Status: DISCONTINUED | OUTPATIENT
Start: 2024-10-17 | End: 2024-10-17

## 2024-10-17 RX ADMIN — Medication 40 MILLIGRAM(S): at 21:52

## 2024-10-17 RX ADMIN — Medication 100 MILLIGRAM(S): at 14:47

## 2024-10-17 RX ADMIN — Medication 25 MILLIGRAM(S): at 06:01

## 2024-10-17 RX ADMIN — Medication 650 MILLIGRAM(S): at 10:30

## 2024-10-17 RX ADMIN — PANTOPRAZOLE SODIUM 40 MILLIGRAM(S): 40 TABLET, DELAYED RELEASE ORAL at 14:46

## 2024-10-17 RX ADMIN — FLUTICASONE PROPIONATE AND SALMETEROL XINAFOATE 1 DOSE(S): 230; 21 AEROSOL, METERED RESPIRATORY (INHALATION) at 18:20

## 2024-10-17 RX ADMIN — APIXABAN 5 MILLIGRAM(S): 5 TABLET, FILM COATED ORAL at 18:20

## 2024-10-17 RX ADMIN — Medication 200 MILLIGRAM(S): at 06:00

## 2024-10-17 RX ADMIN — Medication 25 GRAM(S): at 10:30

## 2024-10-17 RX ADMIN — Medication 10 MILLIGRAM(S): at 06:00

## 2024-10-17 RX ADMIN — HEPARIN SODIUM 11 UNIT(S)/HR: 10000 INJECTION INTRAVENOUS; SUBCUTANEOUS at 12:14

## 2024-10-17 RX ADMIN — Medication 25 MILLIGRAM(S): at 18:20

## 2024-10-17 RX ADMIN — FLUTICASONE PROPIONATE AND SALMETEROL XINAFOATE 1 DOSE(S): 230; 21 AEROSOL, METERED RESPIRATORY (INHALATION) at 06:01

## 2024-10-17 NOTE — CONSULT NOTE ADULT - ASSESSMENT
Impression:    Right elbow wound pressure injury vs. traumatic wound  Pressure Injury Prophylaxis    Recommend:  1.) topical therapy: Right elbow wound - cleanse with VASHE cleanser, pat dry, apply medihoney paste, cover with allevyn foam dressing daily  2.) Incontinence Management - incontinence cleanser, pads, pericare BID  3.) Maintain on an alternating air with low air loss surface  4.) Turn and reposition Q 2 hours  5.) Nutrition optimization - please add Talat  6.) Offload heels/feet with complete cair air fluidized boots/pillows; ensure that the soles of the feet are not resting on the foot board of the bed.  7.) chair cushion for chair sitting  8.) glycemic control    Care as per medicine. Will not actively follow but will remain available. Please recall for new issues or deterioration.  Upon discharge f/u as outpatient at [facility] [address] [place] [address] [phone]  Thank you for this consult  Jenny Clay, DORIE-C, CWOCN via TEAMS   Impression:    Right elbow wound pressure injury vs. traumatic wound  Pressure Injury Prophylaxis    Recommend:  1.) topical therapy: Right elbow wound - cleanse with VASHE cleanser, pat dry, apply medihoney paste, cover with allevyn foam dressing daily  2.) Incontinence Management - incontinence cleanser, pads, pericare BID  3.) Maintain on an alternating air with low air loss surface  4.) Turn and reposition Q 2 hours  5.) Nutrition optimization - please add Talat  6.) Offload heels/feet with complete cair air fluidized boots/pillows; ensure that the soles of the feet are not resting on the foot board of the bed.  7.) chair cushion for chair sitting  8.) glycemic control  9.) Keep skin under/in contact with medical devices clean and dry  10.) Monitor skin under/in contact with medical devices for skin breakdown  11.) Discontinue medical devices as soon as no longer indicated  12.) Utilize medical devices per  guidelines  13.) Protect skin under medical devices with foam dressings for first sign of irritation  14.) Adjust medical devices as needed to ensure proper fit      Care as per medicine. Will not actively follow but will remain available. Please recall for new issues or deterioration.  Upon discharge f/u as outpatient at [facility] [address] [place] [address] [phone]  Thank you for this consult  Jenny Clay, DORIE-C, CWOCN via TEAMS

## 2024-10-17 NOTE — PROGRESS NOTE ADULT - PROBLEM SELECTOR PLAN 3
- US of b/l LEs at [facility] -> R peroneal vein and L popliteal, tibial trunk, peroneal, and posterior tibial DVTs  > Repeat US of b/l LEs showing mulitple DVTs  > holding heparin 10/16 for PEG tube  - holding feeds - US of b/l LEs at [facility] -> R peroneal vein and L popliteal, tibial trunk, peroneal, and posterior tibial DVTs  > Repeat US of b/l LEs showing mulitple DVTs  > holding heparin 10/16 for PEG tube, restarted on 10/17  - holding feeds until GI approval

## 2024-10-17 NOTE — PROGRESS NOTE ADULT - PROBLEM SELECTOR PLAN 2
# RESOLVED    Hospital course at [facility] c/b AHRF s/p intubation on 9/18 for likely benzodiazepine overdose, extubated on 9/19. Diffuse rhonchi with copious secretions on exam. CTA chest (10/4) -> Large PE of R main pulmonary artery, scattered GGOs and interstitial changes predominantly in R and L lower lobes. Likely PE +/- superimposed aspiration PNA s/p empiric Zosyn for 7d (10/5-10/12).    - C/w management of PE, as above  - On RA

## 2024-10-17 NOTE — PROGRESS NOTE ADULT - PROBLEM SELECTOR PLAN 1
- Hospital course at [facility] c/b AHRF  - CTA chest (10/4) -> Large PE of R main pulmonary artery  - US of b/l LEs -> R peroneal vein and L popliteal, tibial trunk, peroneal, and posterior tibial DVTs  - EKG showing evidence of right heart strain, S1Q3T3 changes, T wave depressions in precordial leads; not present on EKG from 9/15  - TTE (9/15) unremarkable  - Troponin 92, BNP 7168 on transfer  > CTH not acutely concerning  > C/w heparin gtt w/ target PTT-> 0.4-0.6 Repeat TTE not concerning  > Vascular cardiology consulted rec heparin for now and eventual transition to DOAC  - Neurosurg consulted for AC management, they said okay to PTT 60-80  - Transitioned to DOAC loading eliquis but transitioned back to heparin pending PEG placement (need to stop 6 hours before procedure and obtain cardiac clearance on Monday)'  - heparin gtt 14 --> 10 --> 11 ml/hr for aPTT 53.8 (goal 60-80) - Hospital course at [facility] c/b AHRF  - CTA chest (10/4) -> Large PE of R main pulmonary artery  - US of b/l LEs -> R peroneal vein and L popliteal, tibial trunk, peroneal, and posterior tibial DVTs  - EKG showing evidence of right heart strain, S1Q3T3 changes, T wave depressions in precordial leads; not present on EKG from 9/15  - TTE (9/15) unremarkable  - Troponin 92, BNP 7168 on transfer  > CTH not acutely concerning  > C/w heparin gtt w/ target PTT-> 0.4-0.6 Repeat TTE not concerning  > Vascular cardiology consulted rec heparin for now and eventual transition to DOAC  - Neurosurg consulted for AC management, they said okay to PTT 60-80  - Transitioned to DOAC loading eliquis but transitioned back to heparin pending PEG placement (need to stop 6 hours before procedure and obtain cardiac clearance on Monday)'  - heparin gtt 14 --> 10 --> 11 ml/hr for aPTT 53.8 (goal 60-80)  - f/u with GI for when to restart eliquis and tube feed.

## 2024-10-17 NOTE — PROGRESS NOTE ADULT - ATTENDING COMMENTS
Agree with above. PEG bumper loosened to 4.5cm, loose and freely mobile. OK to resume A/C and start feeds today. Keep binder on patient as he's confused to prevent dislodgement of PEG.

## 2024-10-17 NOTE — PROGRESS NOTE ADULT - ASSESSMENT
70 yo M with PMH of CAD s/p CABG, CVA, HTN, and T2DM presenting for fall to [facility], found to have alcohol withdrawal with course c/b acute hypercapnic respiratory failure s/p extubation and b/l PEs and DVT on heparin gtt. GI consulted for dysphagia.     Impression:  #Dysphagia    GI consulted for PEG tube placement for oropharyngeal dysphagia. FEES showed uncontrolled spillage of material and pharyngeal stage with repetitive silent aspiration. PEG placed yesterday- bumper loosened to 4.5 cm. No signs of bleeding    Recommendations:  - May administer enteral feeds via PEG today   - Safe to resume AC from GI standpoint  - Nutrition consult for recommendations on enteral feeds  - Keep bumper clean and dry, would avoid gauze between the PEG bumper and skin  - Maintain aspiration precautions and elevate head of bed during feeds  - Close observation to prevent PEG dislodgement, would recommend abdominal binder for protection/prevention of PEG dislodgment  - Eventual Speech and Swallow evaluation as an outpatient to evaluate for swallowing; would not be a candidate for PEG removal until at least 6-8 weeks post placement     We will sign off at this time, however please call back with questions or should any worsening/persistent issues arise.      All recommendations are tentative until note is attested by attending.     Venita Proctor, PGY4  Gastroenterology/Hepatology Fellow  Available on Microsoft Teams  [phone] (Long Range Pager)  [phone] (Short Range Pager [facility])    After 5 pm, please contact the on-call GI fellow for any urgent issues via the Hospital Call     70 yo M with PMH of CAD s/p CABG, CVA, HTN, and T2DM presenting for fall to [facility], found to have alcohol withdrawal with course c/b acute hypercapnic respiratory failure s/p extubation and b/l PEs and DVT on heparin gtt. GI consulted for dysphagia.     Impression:  #Dysphagia    GI consulted for PEG tube placement for oropharyngeal dysphagia. FEES showed uncontrolled spillage of material and pharyngeal stage with repetitive silent aspiration. PEG placed yesterday- bumper loosened to 4.5 cm. No signs of bleeding    Recommendations:  - May administer enteral feeds via PEG today   - ok to resume AC from GI standpoint  - Nutrition consult for recommendations on enteral feeds  - Keep bumper clean and dry, would avoid gauze between the PEG bumper and skin  - Maintain aspiration precautions and elevate head of bed during feeds  - Close observation to prevent PEG dislodgement, would recommend abdominal binder for protection/prevention of PEG dislodgment  - Eventual Speech and Swallow evaluation as an outpatient to evaluate for swallowing; would not be a candidate for PEG removal until at least 6-8 weeks post placement     We will sign off at this time, however please call back with questions or should any worsening/persistent issues arise.      All recommendations are tentative until note is attested by attending.     Venita Proctor, PGY4  Gastroenterology/Hepatology Fellow  Available on Microsoft Teams  [phone] (Long Range Pager)  [phone] (Short Range Pager [facility])    After 5 pm, please contact the on-call GI fellow for any urgent issues via the Hospital Call

## 2024-10-17 NOTE — PROGRESS NOTE ADULT - SUBJECTIVE AND OBJECTIVE BOX
Interval Events:   - PEG tube placed yesterday  - Pt endorses discomfort at PEG site    ROS:   12 point review of systems performed and negative except otherwise noted in HPI.    Hospital Medications:  albuterol/ipratropium for Nebulization 3 milliLiter(s) Nebulizer every 6 hours PRN  aMIOdarone    Tablet 200 milliGRAM(s) Oral daily  amLODIPine   Tablet 10 milliGRAM(s) Oral daily  atorvastatin 40 milliGRAM(s) Oral at bedtime  dextrose 5%. 1000 milliLiter(s) IV Continuous <Continuous>  dextrose 5%. 1000 milliLiter(s) IV Continuous <Continuous>  dextrose 50% Injectable 12.5 Gram(s) IV Push once  dextrose 50% Injectable 25 Gram(s) IV Push once  dextrose 50% Injectable 25 Gram(s) IV Push once  dextrose Oral Gel 15 Gram(s) Oral once  fluticasone propionate/ salmeterol 250-50 MICROgram(s) Diskus 1 Dose(s) Inhalation two times a day  glucagon  Injectable 1 milliGRAM(s) IntraMuscular once  heparin  Infusion 1100 Unit(s)/Hr IV Continuous <Continuous>  insulin lispro (ADMELOG) corrective regimen sliding scale   SubCutaneous at bedtime  insulin lispro (ADMELOG) corrective regimen sliding scale   SubCutaneous every 6 hours  metoprolol tartrate 25 milliGRAM(s) Oral two times a day  pantoprazole  Injectable 40 milliGRAM(s) IV Push daily  polyethylene glycol 3350 17 Gram(s) Oral daily  thiamine 100 milliGRAM(s) Oral daily      PHYSICAL EXAM:   Vital Signs:  Vital Signs Last 24 Hrs  T(C): 36.5 (14 Oct 2024 11:12), Max: 36.9 (14 Oct 2024 00:01)  T(F): 97.7 (14 Oct 2024 11:12), Max: 98.4 (14 Oct 2024 00:01)  HR: 76 (14 Oct 2024 11:12) (69 - 82)  BP: 116/77 (14 Oct 2024 11:12) (115/76 - 124/85)  BP(mean): --  RR: 18 (14 Oct 2024 11:12) (18 - 18)  SpO2: 95% (14 Oct 2024 11:12) (94% - 96%)    Parameters below as of 14 Oct 2024 11:12  Patient On (Oxygen Delivery Method): room air      Daily     Daily     GENERAL:  No acute distress, dysphonic, on 2 L NC  HEENT:  Normocephalic/atraumatic, no scleral icterus  CHEST:  No accessory muscle use  HEART:  Regular rate and rhythm  ABDOMEN:  Soft, non-tender, non-distended, normoactive bowel sounds,  +laparoscopic surgical scar, PEG tube site c/d/i  EXTREMITIES: No cyanosis, clubbing, or edema  SKIN:  No rash  NEURO:  Alert and oriented x 2, no asterixis    LABS: reviewed                        10.9   5.69  )-----------( 237      ( 14 Oct 2024 06:30 )             34.4     10-14    137  |  96  |  13  ----------------------------<  141[H]  4.0   |  28  |  0.71    Ca    10.2      14 Oct 2024 06:26  Phos  4.2     10-14  Mg     2.0     10-14    TPro  7.5  /  Alb  3.8  /  TBili  0.3  /  DBili  x   /  AST  16  /  ALT  15  /  AlkPhos  101  10-14    LIVER FUNCTIONS - ( 14 Oct 2024 06:26 )  Alb: 3.8 g/dL / Pro: 7.5 g/dL / ALK PHOS: 101 U/L / ALT: 15 U/L / AST: 16 U/L / GGT: x             Interval Diagnostic Studies: see sunrise for full report

## 2024-10-17 NOTE — PROGRESS NOTE ADULT - PROBLEM SELECTOR PLAN 10
DVT Ppx: holding heparin 10/16  Diet: TF (holding 10/16)  Dispo: TBD DVT Ppx: holding heparin 10/16, restarted 10/17  Diet: TF (holding 10/16)  Dispo: TBD

## 2024-10-17 NOTE — PROGRESS NOTE ADULT - SUBJECTIVE AND OBJECTIVE BOX
INTERVAL HPI/OVERNIGHT EVENTS:  Pt seen and examined at bedside. No acute overnight events or complaints.    Brief Daily Plan:  -    VITAL SIGNS:  T(F): 97.7 (10-17-24 @ 05:27)  HR: 82 (10-17-24 @ 05:27)  BP: 111/71 (10-17-24 @ 05:27)  RR: 18 (10-17-24 @ 05:27)  SpO2: 98% (10-17-24 @ 05:27)  Wt(kg): --    PHYSICAL EXAM:    CONSTITUTIONAL: NAD, sleeping comfortably, AAOx1 ("bank," "2004)  HEENT: MMM  RESPIRATORY: Normal respiratory effort; lungs are clear to auscultation bilaterally  CARDIOVASCULAR: Regular rate and rhythm, normal S1 and S2, +systolic murmur  ABDOMEN: Nontender to palpation, normoactive bowel sounds, no rebound/guarding  MUSCULOSKELETAL:  Moving limbs spontaneously; RUE in sling; No lower extremity edema  PSYCH: Affect appropriate, pleasant    MEDICATIONS  (STANDING):  aMIOdarone    Tablet 200 milliGRAM(s) Oral daily  amLODIPine   Tablet 10 milliGRAM(s) Oral daily  atorvastatin 40 milliGRAM(s) Oral at bedtime  dextrose 5% + lactated ringers. 1000 milliLiter(s) (75 mL/Hr) IV Continuous <Continuous>  dextrose 5%. 1000 milliLiter(s) (50 mL/Hr) IV Continuous <Continuous>  dextrose 5%. 1000 milliLiter(s) (100 mL/Hr) IV Continuous <Continuous>  dextrose 50% Injectable 12.5 Gram(s) IV Push once  dextrose 50% Injectable 25 Gram(s) IV Push once  dextrose 50% Injectable 25 Gram(s) IV Push once  dextrose Oral Gel 15 Gram(s) Oral once  fluticasone propionate/ salmeterol 250-50 MICROgram(s) Diskus 1 Dose(s) Inhalation two times a day  glucagon  Injectable 1 milliGRAM(s) IntraMuscular once  insulin lispro (ADMELOG) corrective regimen sliding scale   SubCutaneous at bedtime  insulin lispro (ADMELOG) corrective regimen sliding scale   SubCutaneous every 6 hours  metoprolol tartrate 25 milliGRAM(s) Oral two times a day  pantoprazole  Injectable 40 milliGRAM(s) IV Push daily  polyethylene glycol 3350 17 Gram(s) Oral daily  sodium chloride 0.9%. 500 milliLiter(s) (30 mL/Hr) IV Continuous <Continuous>  thiamine 100 milliGRAM(s) Oral daily    MEDICATIONS  (PRN):  albuterol/ipratropium for Nebulization 3 milliLiter(s) Nebulizer every 6 hours PRN Shortness of Breath and/or Wheezing      Allergies    No Known Allergies    Intolerances        LABS:                        11.0   6.95  )-----------( 208      ( 16 Oct 2024 04:34 )             34.8     10-16    137  |  96  |  15  ----------------------------<  127[H]  4.0   |  26  |  0.77    Ca    10.1      16 Oct 2024 04:34  Phos  5.0     10-16  Mg     2.1     10-16      PT/INR - ( 16 Oct 2024 04:34 )   PT: 13.4 sec;   INR: 1.17 ratio         PTT - ( 16 Oct 2024 04:34 )  PTT:76.1 sec  Urinalysis Basic - ( 16 Oct 2024 04:34 )    Color: x / Appearance: x / SG: x / pH: x  Gluc: 127 mg/dL / Ketone: x  / Bili: x / Urobili: x   Blood: x / Protein: x / Nitrite: x   Leuk Esterase: x / RBC: x / WBC x   Sq Epi: x / Non Sq Epi: x / Bacteria: x        RADIOLOGY & ADDITIONAL TESTS:  Reviewed  ******************  Authored By: Henok Nava MD, PGY1  MS Teams Preferred  ******************   INTERVAL HPI/OVERNIGHT EVENTS:  Pt seen and examined at bedside. No acute overnight events or complaints.    Brief Daily Plan:  - restarted heparin drip around 10 am.   - Will follow GI recs when to start tube feeds and eliquis.     VITAL SIGNS:  T(F): 97.7 (10-17-24 @ 05:27)  HR: 82 (10-17-24 @ 05:27)  BP: 111/71 (10-17-24 @ 05:27)  RR: 18 (10-17-24 @ 05:27)  SpO2: 98% (10-17-24 @ 05:27)  Wt(kg): --    PHYSICAL EXAM:    CONSTITUTIONAL: NAD, sleeping comfortably, AAOx1 ("bank," "2004)  HEENT: MMM  RESPIRATORY: Normal respiratory effort; lungs are clear to auscultation bilaterally  CARDIOVASCULAR: Regular rate and rhythm, normal S1 and S2, +systolic murmur  ABDOMEN: Nontender to palpation, normoactive bowel sounds, no rebound/guarding  MUSCULOSKELETAL:  Moving limbs spontaneously; RUE in sling; No lower extremity edema  PSYCH: Affect appropriate, pleasant    MEDICATIONS  (STANDING):  aMIOdarone    Tablet 200 milliGRAM(s) Oral daily  amLODIPine   Tablet 10 milliGRAM(s) Oral daily  atorvastatin 40 milliGRAM(s) Oral at bedtime  dextrose 5% + lactated ringers. 1000 milliLiter(s) (75 mL/Hr) IV Continuous <Continuous>  dextrose 5%. 1000 milliLiter(s) (50 mL/Hr) IV Continuous <Continuous>  dextrose 5%. 1000 milliLiter(s) (100 mL/Hr) IV Continuous <Continuous>  dextrose 50% Injectable 12.5 Gram(s) IV Push once  dextrose 50% Injectable 25 Gram(s) IV Push once  dextrose 50% Injectable 25 Gram(s) IV Push once  dextrose Oral Gel 15 Gram(s) Oral once  fluticasone propionate/ salmeterol 250-50 MICROgram(s) Diskus 1 Dose(s) Inhalation two times a day  glucagon  Injectable 1 milliGRAM(s) IntraMuscular once  insulin lispro (ADMELOG) corrective regimen sliding scale   SubCutaneous at bedtime  insulin lispro (ADMELOG) corrective regimen sliding scale   SubCutaneous every 6 hours  metoprolol tartrate 25 milliGRAM(s) Oral two times a day  pantoprazole  Injectable 40 milliGRAM(s) IV Push daily  polyethylene glycol 3350 17 Gram(s) Oral daily  sodium chloride 0.9%. 500 milliLiter(s) (30 mL/Hr) IV Continuous <Continuous>  thiamine 100 milliGRAM(s) Oral daily    MEDICATIONS  (PRN):  albuterol/ipratropium for Nebulization 3 milliLiter(s) Nebulizer every 6 hours PRN Shortness of Breath and/or Wheezing      Allergies    No Known Allergies    Intolerances        LABS:                        11.0   6.95  )-----------( 208      ( 16 Oct 2024 04:34 )             34.8     10-16    137  |  96  |  15  ----------------------------<  127[H]  4.0   |  26  |  0.77    Ca    10.1      16 Oct 2024 04:34  Phos  5.0     10-16  Mg     2.1     10-16      PT/INR - ( 16 Oct 2024 04:34 )   PT: 13.4 sec;   INR: 1.17 ratio         PTT - ( 16 Oct 2024 04:34 )  PTT:76.1 sec  Urinalysis Basic - ( 16 Oct 2024 04:34 )    Color: x / Appearance: x / SG: x / pH: x  Gluc: 127 mg/dL / Ketone: x  / Bili: x / Urobili: x   Blood: x / Protein: x / Nitrite: x   Leuk Esterase: x / RBC: x / WBC x   Sq Epi: x / Non Sq Epi: x / Bacteria: x        RADIOLOGY & ADDITIONAL TESTS:  Reviewed  ******************  Authored By: Henok Nava MD, PGY1  MS Teams Preferred  ******************

## 2024-10-17 NOTE — PROGRESS NOTE ADULT - ATTENDING COMMENTS
69 year old male, with PMH of HTN, DM2, ?CVA/TIA, Aflutter, CAD s/p CABG (2018), EtOH use disorder (two 40oz beers daily), current smoker (8 cigs/day), L total hip arthroplasty, initially admitted to [facility] (9/14/24-10/4/24) after mech fall c/b R prox humeral fx and EtOH withdrawal. Was on CIWA with Librium/Ativan PRN. TTE on 9/15/24 showed LVEF 60-65%, grad I DD, and an aortic valve prosthesis with normal function. Course c/b AMS, acute hypoxemic/hypercapnic respiratory, and agonal breathing on 9/18/24. Was initially given flumazenil x2 with some improvement, but was intubated for airway protection given excessive secretions and transferred to MICU 9/18/24. CTA chest was somewhat limited study but did not find PE at the time, noted RUL GGOs "suspicious for atypical or viral infection." CTH showed subacute and chronic SDHs as well as chronic lacunar infarct within L lentiform nucleus. Repeat CT showed stable SDHs. Extubated on 9/19/24. Pt was downgraded to Medicine floors on 9/21/24. Noted to have loss of voice and dysphagia. Evaluated by S+S, found dysphagia on video flouroscopy to all textures and recommended for PEG tube, but pt refused and opted for NGT placement. Ortho surgery that was planned for humeral fx was cancelled and ultimately recommended for conservative management only. CXR on 10/3/24 showed new LLL infiltrate, started on Zosyn. On 10/4/24, pt desatted on supplemental O2, CTA chest found massive PE in R main bronchus with pulm trunk dilatation. BLE duplex also found b/l LE DVTs (L>R). Started on hep gtt and transferred to [facility] for embolectomy evaluation.      #Acute hypoxemic respiratory failure  #R main bronchus PE, b/l LE DVTs  #?PNA  #CVA  #Dysphagia  #R humeral fx  #Chronic aflutter  #DM2  #EtOH/tobacco use disorder    - on 2L but saturating well; wean O2 as tolerated   - completed 7 day course of zosyn   - c/w amio and metoprolol (was on at OSH)  - c/w advair BID and duonebs q6h PRN  - c/w low ISS for now, monitor FS  - monitor on telemetry  - MRI head showing acute/subacute infarcts; neuro recs appreciated; f/u MG labs   - statin, thiamine  - TTE no acute findings   - speech and swallow, NPO, FEES -> did not pass study   - transitioned to eliquis 10mg BID for 7 days (including heparin drip days); now back on heparin drip for planned PEG tube placement; when ready to switch back to eliquis, will be on 5mg BID  - ortho - outpatient f/u   - started on NG feeds 10/5 but patient pulled out NGT 10/6 overnight; ENT replaced NGT on 10/7. s/p PEG placement 10/16; tolerated procedure well; f/u GI about when to resume eliquis and when to initiate tube feeds; dietitian f/u   - likely plan to start tube feeds if ok with GI and monitor for tolerance and dispo plan; CM f/u for dc to [facility]     Discussed with sister at bedside.     Rest as above. Discussed with HS.

## 2024-10-17 NOTE — CONSULT NOTE ADULT - SUBJECTIVE AND OBJECTIVE BOX
Wound Surgery Consult Note:    HPI:  Mr. Jarrett Overton is a 69-year-old w/ PMH of HTN, T2DM, CVA, A. flutter, CAD s/p CABG x3 (~2018), and alcohol use disorder who initially presented to [facility] on 9/14 for a mechanical fall after he tripped and fell onto his R shoulder, denied any headstrike or LOC, subsequent XR showing R proximal humerus fracture with planned ORIF with Orthopedic Surgery. Course was c/b alcohol withdrawal and was started on CIWA protocol. On 9/18, RRT was called for AMS and patient was found to be in acute hypercapnic respiratory failure iso prior Librium dose. Patient was given flumazenil and then intubated and transferred to the MICU. ORIF of R humerus was cancelled and CT head showed signs of subacute and chronic subdural hematomas. Patient was extubated on 9/19 and repeat CT head showed stable hematomas. Following extubation, patient suffered from significant dysphagia but patient declined PEG tube offered by GI and instead NG tube was placed. On 10/4, patient was found to be hypoxic with subsequent CTA chest showing large PE of R main pulmonary artery. US of b/l LEs was further significant for b/l DVTs in the R peroneal vein and L popliteal, tibial trunk, peroneal, and posterior tibial veins. Patient was transferred to [facility] for further management of PE and embolectomy evaluation. (05 Oct 2024 01:45)    Request for wound care evaluation of the Right elbow received from nursing. Mr. Overton was encountered on an alternating air with low air loss surface. His right arm was in a sling. He stated that when he fell on 9.14, prior to admission, that he landed on his right shoulder, elbow and hip. He stated that he has no pain/discomfort associated with the right elbow wound. His immobility and inactivity in addition to poor nutritional status all contribute to his risk of pressure injury development and hinder healing. Principles of pressure injury prevention including but not limited to turning and positioning reviewed with patient.     PAST MEDICAL & SURGICAL HISTORY:  HTN (hypertension)  T2DM (type 2 diabetes mellitus)  CVA (cerebrovascular accident)  Atrial flutter  CAD (coronary artery disease)  History of alcohol use disorder  History of repair of left hip joint    REVIEW OF SYSTEMS  CONSTITUTIONAL: + weakness, no fevers or chills  EYES/ENT: No visual changes;  No vertigo or throat pain   MOUTH: No oral lesion, moist  NECK: No pain or stiffness  RESPIRATORY: No cough, wheezing, hemoptysis; No shortness of breath  CARDIOVASCULAR: No chest pain or palpitations  GASTROINTESTINAL: No abdominal or epigastric pain. No nausea, vomiting, or hematemesis; No diarrhea or constipation. No melena or hematochezia.  GENITOURINARY: No dysuria, frequency or hematuria  NEUROLOGICAL: + weakness  SKIN: No itching, rashes  PSYCH: no confusion or altered mental status    MEDICATIONS  (STANDING):  aMIOdarone    Tablet 200 milliGRAM(s) Oral daily  amLODIPine   Tablet 10 milliGRAM(s) Oral daily  atorvastatin 40 milliGRAM(s) Oral at bedtime  dextrose 5% + lactated ringers. 1000 milliLiter(s) (75 mL/Hr) IV Continuous <Continuous>  dextrose 5%. 1000 milliLiter(s) (100 mL/Hr) IV Continuous <Continuous>  dextrose 5%. 1000 milliLiter(s) (50 mL/Hr) IV Continuous <Continuous>  dextrose 50% Injectable 12.5 Gram(s) IV Push once  dextrose 50% Injectable 25 Gram(s) IV Push once  dextrose 50% Injectable 25 Gram(s) IV Push once  dextrose Oral Gel 15 Gram(s) Oral once  fluticasone propionate/ salmeterol 250-50 MICROgram(s) Diskus 1 Dose(s) Inhalation two times a day  glucagon  Injectable 1 milliGRAM(s) IntraMuscular once  heparin  Infusion 1100 Unit(s)/Hr (11 mL/Hr) IV Continuous <Continuous>  insulin lispro (ADMELOG) corrective regimen sliding scale   SubCutaneous at bedtime  insulin lispro (ADMELOG) corrective regimen sliding scale   SubCutaneous every 6 hours  metoprolol tartrate 25 milliGRAM(s) Oral two times a day  pantoprazole  Injectable 40 milliGRAM(s) IV Push daily  polyethylene glycol 3350 17 Gram(s) Oral daily  thiamine 100 milliGRAM(s) Oral daily    MEDICATIONS  (PRN):  albuterol/ipratropium for Nebulization 3 milliLiter(s) Nebulizer every 6 hours PRN Shortness of Breath and/or Wheezing    Allergies    No Known Allergies    Intolerances    SOCIAL HISTORY:  Denies smoking, ETOH, drugs    FAMILY HISTORY: not pertinent family history among first degree relatives    Vital Signs Last 24 Hrs  T(C): 36.9 (17 Oct 2024 11:09), Max: 37.3 (16 Oct 2024 20:48)  T(F): 98.4 (17 Oct 2024 11:09), Max: 99.1 (16 Oct 2024 20:48)  HR: 72 (17 Oct 2024 11:09) (72 - 89)  BP: 105/70 (17 Oct 2024 11:09) (103/68 - 115/77)  BP(mean): --  RR: 18 (17 Oct 2024 11:09) (14 - 18)  SpO2: 100% (17 Oct 2024 11:09) (95% - 100%)    Parameters below as of 17 Oct 2024 11:09  Patient On (Oxygen Delivery Method): nasal cannula  O2 Flow (L/min): 2    Physical Exam:  General: alert, WN  Ophthamology: sclera clear  ENMT: moist mucous membranes, trachea midline  Respiratory: equal chest rise with respirations  Gastrointestinal: soft NT/ND  Neurology: verbal,  following commands  Psych: calm, cooperative  Musculoskeletal: no contractures  Vascular: BLE edema equal  Skin:  Right elbow wound 90% covered with adherent, thin, moist, tan thin layer of slough in the center with perimeter pink wound bed,  L 3.5cm x 3.5cm x unknown, small amount of drainage   No odor, increased warmth, tenderness, induration, fluctuance, erythema    LABS:  10-17    136  |  98  |  13  ----------------------------<  345[H]  3.9   |  25  |  0.71    Ca    9.7      17 Oct 2024 07:28  Phos  4.0     10-17  Mg     1.8     10-17                            9.9    9.30  )-----------( 159      ( 17 Oct 2024 07:27 )             31.7     PT/INR - ( 16 Oct 2024 04:34 )   PT: 13.4 sec;   INR: 1.17 ratio         PTT - ( 16 Oct 2024 04:34 )  PTT:76.1 sec  Urinalysis Basic - ( 17 Oct 2024 07:28 )    Color: x / Appearance: x / SG: x / pH: x  Gluc: 345 mg/dL / Ketone: x  / Bili: x / Urobili: x   Blood: x / Protein: x / Nitrite: x   Leuk Esterase: x / RBC: x / WBC x   Sq Epi: x / Non Sq Epi: x / Bacteria: x

## 2024-10-17 NOTE — PROGRESS NOTE ADULT - ASSESSMENT
Mr. Jarrett Overton is a 69-year-old w/ PMH of HTN, T2DM, CVA, A. flutter, CAD s/p CABG x3 (~2018), and alcohol use disorder who initially presented to [facility] on 9/14 for a mechanical fall. On 10/4, patient was found to be hypoxic with subsequent CTA chest showing large PE of R main pulmonary artery. US of b/l LEs was further significant for b/l DVTs in the R peroneal vein and L popliteal, tibial trunk, peroneal, and posterior tibial veins. Patient was transferred to [facility] for further management of PE and tx of superimposed PNA, now pending PEG placement, dispo [facility].

## 2024-10-17 NOTE — CONSULT NOTE ADULT - CONSULT REQUESTED DATE/TIME
05-Oct-2024 01:58
11-Oct-2024 09:00
17-Oct-2024 14:40
07-Oct-2024
09-Oct-2024 13:16
14-Oct-2024 10:25

## 2024-10-18 LAB
ALBUMIN SERPL ELPH-MCNC: 3.8 G/DL — SIGNIFICANT CHANGE UP (ref 3.3–5)
ALP SERPL-CCNC: 96 U/L — SIGNIFICANT CHANGE UP (ref 40–120)
ALT FLD-CCNC: 13 U/L — SIGNIFICANT CHANGE UP (ref 10–45)
ANION GAP SERPL CALC-SCNC: 13 MMOL/L — SIGNIFICANT CHANGE UP (ref 5–17)
AST SERPL-CCNC: 11 U/L — SIGNIFICANT CHANGE UP (ref 10–40)
BASOPHILS # BLD AUTO: 0.06 K/UL — SIGNIFICANT CHANGE UP (ref 0–0.2)
BASOPHILS NFR BLD AUTO: 0.8 % — SIGNIFICANT CHANGE UP (ref 0–2)
BILIRUB SERPL-MCNC: 0.6 MG/DL — SIGNIFICANT CHANGE UP (ref 0.2–1.2)
BUN SERPL-MCNC: 12 MG/DL — SIGNIFICANT CHANGE UP (ref 7–23)
CALCIUM SERPL-MCNC: 10 MG/DL — SIGNIFICANT CHANGE UP (ref 8.4–10.5)
CHLORIDE SERPL-SCNC: 97 MMOL/L — SIGNIFICANT CHANGE UP (ref 96–108)
CO2 SERPL-SCNC: 25 MMOL/L — SIGNIFICANT CHANGE UP (ref 22–31)
CREAT SERPL-MCNC: 0.65 MG/DL — SIGNIFICANT CHANGE UP (ref 0.5–1.3)
EGFR: 103 ML/MIN/1.73M2 — SIGNIFICANT CHANGE UP
EOSINOPHIL # BLD AUTO: 0.22 K/UL — SIGNIFICANT CHANGE UP (ref 0–0.5)
EOSINOPHIL NFR BLD AUTO: 2.8 % — SIGNIFICANT CHANGE UP (ref 0–6)
GLUCOSE BLDC GLUCOMTR-MCNC: 123 MG/DL — HIGH (ref 70–99)
GLUCOSE BLDC GLUCOMTR-MCNC: 124 MG/DL — HIGH (ref 70–99)
GLUCOSE BLDC GLUCOMTR-MCNC: 127 MG/DL — HIGH (ref 70–99)
GLUCOSE BLDC GLUCOMTR-MCNC: 139 MG/DL — HIGH (ref 70–99)
GLUCOSE BLDC GLUCOMTR-MCNC: 94 MG/DL — SIGNIFICANT CHANGE UP (ref 70–99)
GLUCOSE SERPL-MCNC: 104 MG/DL — HIGH (ref 70–99)
HCT VFR BLD CALC: 33.2 % — LOW (ref 39–50)
HGB BLD-MCNC: 10.6 G/DL — LOW (ref 13–17)
IMM GRANULOCYTES NFR BLD AUTO: 0.4 % — SIGNIFICANT CHANGE UP (ref 0–0.9)
LYMPHOCYTES # BLD AUTO: 1.49 K/UL — SIGNIFICANT CHANGE UP (ref 1–3.3)
LYMPHOCYTES # BLD AUTO: 18.7 % — SIGNIFICANT CHANGE UP (ref 13–44)
MAGNESIUM SERPL-MCNC: 2 MG/DL — SIGNIFICANT CHANGE UP (ref 1.6–2.6)
MCHC RBC-ENTMCNC: 29.6 PG — SIGNIFICANT CHANGE UP (ref 27–34)
MCHC RBC-ENTMCNC: 31.9 GM/DL — LOW (ref 32–36)
MCV RBC AUTO: 92.7 FL — SIGNIFICANT CHANGE UP (ref 80–100)
MONOCYTES # BLD AUTO: 0.94 K/UL — HIGH (ref 0–0.9)
MONOCYTES NFR BLD AUTO: 11.8 % — SIGNIFICANT CHANGE UP (ref 2–14)
NEUTROPHILS # BLD AUTO: 5.24 K/UL — SIGNIFICANT CHANGE UP (ref 1.8–7.4)
NEUTROPHILS NFR BLD AUTO: 65.5 % — SIGNIFICANT CHANGE UP (ref 43–77)
NRBC # BLD: 0 /100 WBCS — SIGNIFICANT CHANGE UP (ref 0–0)
PHOSPHATE SERPL-MCNC: 3.9 MG/DL — SIGNIFICANT CHANGE UP (ref 2.5–4.5)
PLATELET # BLD AUTO: 164 K/UL — SIGNIFICANT CHANGE UP (ref 150–400)
POTASSIUM SERPL-MCNC: 3.9 MMOL/L — SIGNIFICANT CHANGE UP (ref 3.5–5.3)
POTASSIUM SERPL-SCNC: 3.9 MMOL/L — SIGNIFICANT CHANGE UP (ref 3.5–5.3)
PROT SERPL-MCNC: 7.5 G/DL — SIGNIFICANT CHANGE UP (ref 6–8.3)
RBC # BLD: 3.58 M/UL — LOW (ref 4.2–5.8)
RBC # FLD: 13.6 % — SIGNIFICANT CHANGE UP (ref 10.3–14.5)
SODIUM SERPL-SCNC: 135 MMOL/L — SIGNIFICANT CHANGE UP (ref 135–145)
WBC # BLD: 7.98 K/UL — SIGNIFICANT CHANGE UP (ref 3.8–10.5)
WBC # FLD AUTO: 7.98 K/UL — SIGNIFICANT CHANGE UP (ref 3.8–10.5)

## 2024-10-18 PROCEDURE — 99232 SBSQ HOSP IP/OBS MODERATE 35: CPT | Mod: GC

## 2024-10-18 PROCEDURE — 99497 ADVNCD CARE PLAN 30 MIN: CPT | Mod: GC,25

## 2024-10-18 RX ORDER — PANTOPRAZOLE SODIUM 40 MG/1
40 TABLET, DELAYED RELEASE ORAL DAILY
Refills: 0 | Status: DISCONTINUED | OUTPATIENT
Start: 2024-10-19 | End: 2024-10-24

## 2024-10-18 RX ADMIN — APIXABAN 5 MILLIGRAM(S): 5 TABLET, FILM COATED ORAL at 18:53

## 2024-10-18 RX ADMIN — FLUTICASONE PROPIONATE AND SALMETEROL XINAFOATE 1 DOSE(S): 230; 21 AEROSOL, METERED RESPIRATORY (INHALATION) at 18:53

## 2024-10-18 RX ADMIN — FLUTICASONE PROPIONATE AND SALMETEROL XINAFOATE 1 DOSE(S): 230; 21 AEROSOL, METERED RESPIRATORY (INHALATION) at 06:31

## 2024-10-18 RX ADMIN — APIXABAN 5 MILLIGRAM(S): 5 TABLET, FILM COATED ORAL at 06:31

## 2024-10-18 RX ADMIN — Medication 25 MILLIGRAM(S): at 18:53

## 2024-10-18 RX ADMIN — Medication 25 MILLIGRAM(S): at 06:30

## 2024-10-18 RX ADMIN — Medication 40 MILLIGRAM(S): at 22:54

## 2024-10-18 RX ADMIN — Medication 200 MILLIGRAM(S): at 06:29

## 2024-10-18 RX ADMIN — PANTOPRAZOLE SODIUM 40 MILLIGRAM(S): 40 TABLET, DELAYED RELEASE ORAL at 14:44

## 2024-10-18 RX ADMIN — POLYETHYLENE GLYCOL 3350 17 GRAM(S): 17 POWDER, FOR SOLUTION ORAL at 14:44

## 2024-10-18 RX ADMIN — Medication 100 MILLIGRAM(S): at 14:45

## 2024-10-18 RX ADMIN — Medication 10 MILLIGRAM(S): at 06:30

## 2024-10-18 NOTE — CHART NOTE - NSCHARTNOTEFT_GEN_A_CORE
NUTRITION FOLLOW UP NOTE    PATIENT SEEN FOR: RD consulted for Tube Feeding    SOURCE: [X] Patient  [x] Current Medical Record  [X] RN  [] Family/support person at bedside  [] Patient unavailable/inappropriate  [] Other:    CHART REVIEWED/EVENTS NOTED.  [] No changes to nutrition care plan to note  [x] Nutrition Status:  - S/p FEES (10/10); Now S/p PEG placement (10/16)       DIET ORDER:   Diet, NPO with Tube Feed:   Tube Feeding Modality: Gastrostomy  Glucerna 1.2 Ronaldo (GLUCERNARTH)  Total Volume for 24 Hours (mL): 1440  Continuous  Starting Tube Feed Rate {mL per Hour}: 10  Increase Tube Feed Rate by (mL): 10     Every 4 hours  Until Goal Tube Feed Rate (mL per Hour): 60  Tube Feed Duration (in Hours): 24  Tube Feed Start Time: 17:00 (10-17-24)    CURRENT DIET ORDER:  [] PO:  [X] Enteral Nutrition:  - Reports tolerating current EN regimen via NG tube with Glucerna 1.2 @ 40 mL/hr. Per RN, attempted to titrate upwards to 50 mL/hr this AM, however, pt reporting abdominal pain so returned to previous rate of 40 mL/hr.   - EN Regimen at goal provides 1440mL total volume, 1728 kcal/day, 86 g pro/day, 1159 mL free water/day; provides 25.5 kcal/kg and 1.27 g/kg; based on current dosing weight 67.7 kg.     Protein Modular(s) Provides: N/A    Current Pump Rate: 40 mL/hr  Unable to assess EN provision at this time secondary to lack of documentation  [] Parenteral Nutrition:    ANTHROPOMETRICS:  Drug Dosing Weight  Height (cm): 175.3 (per [facility])  Weight (kg): 67.7 (04 Oct 2024 22:19)  BMI (kg/m^2): 22.1 (based on updated height)    Weights:   Daily wts in k.2 (10/11, bed), 67.7 (10/4, bed).   RD will continue to monitor wt trends as able/available.     NUTRITIONALLY PERTINENT MEDICATIONS:  MEDICATIONS  (STANDING):  aMIOdarone    Tablet  amLODIPine   Tablet  atorvastatin  dextrose 5%.  dextrose 5%.  dextrose 50% Injectable  dextrose 50% Injectable  dextrose 50% Injectable  dextrose Oral Gel  glucagon  Injectable  insulin lispro (ADMELOG) corrective regimen sliding scale  insulin lispro (ADMELOG) corrective regimen sliding scale  metoprolol tartrate  polyethylene glycol 3350  thiamine       NUTRITIONALLY PERTINENT LABS:  10-18 Na135 mmol/L Glu 104 mg/dL[H] K+ 3.9 mmol/L Cr  0.65 mg/dL BUN 12 mg/dL 10-18 Phos 3.9 mg/dL 10-18 Alb 3.8 g/dL 10-10 Chol 159 mg/dL LDL --    HDL 34 mg/dL[L] Trig 114 mg/dL10-18 ALT 13 U/L AST 11 U/L Alkaline Phosphatase 96 U/L  10-05-24 @ 14:44 a1c 6.1    A1C with Estimated Average Glucose Result: 6.1 % (10-05-24 @ 14:44)    Finger Sticks:  POCT Blood Glucose.: 127 mg/dL (10-18 @ 12:28)  POCT Blood Glucose.: 124 mg/dL (10-18 @ 08:14)  POCT Blood Glucose.: 94 mg/dL (10-18 @ 00:15)  POCT Blood Glucose.: 98 mg/dL (10-17 @ 17:59)      NUTRITIONALLY PERTINENT MEDICATIONS/LABS:  [x] Reviewed  [X] Relevant notes on medications/labs:  - POCTs x 24 hrs WNL. Ordered for SSI.   - Ordered for thiamine supplementation.     EDEMA:  [x] Reviewed  [] Relevant notes:    GI/ I&O:  [x] Reviewed  [X] Relevant notes: Last BM 10/17 per RN. Ordered for Miralax.   [X] Other: Ordered for PPI.     SKIN:   [] No pressure injuries documented, per nursing flowsheet  [] Pressure injury previously noted  [x] Change in pressure injury documentation: Per wound care (10/17), pt with "Right elbow wound pressure injury vs. traumatic wound"  [] Other:    ESTIMATED NEEDS:  [] No change:  [X] Updated:  Energy:  1896 - 2234 kcal/day (28 - 33 kcal/kg)  Protein:  81 - 94.5 g/day (1.2 - 1.4 g/kg)  Fluid:   ml/day or [x] defer to team  Based on: dosing wt of 67.7 kg (10/4)    NUTRITION DIAGNOSIS:  [X] Prior Dx: (1) Moderate Protein Calorie Malnutrition in Context of Acute Illness  [X] New Dx: Increased Nutrient (Protein-micronutrient) Needs related to increased physiological demands as evidenced by R elbow pressure injury vs traumatic wound per wound care  Goal: Pt will consume >/= 75% of estimated nutrient needs via best tolerated route.     EDUCATION:  [] Yes:  [X] Not appropriate/warranted    NUTRITION CARE PLAN:  [X] To better meet EER, advance EN regimen via PEG tube as follows: Glucerna 1.2 @ 40 mL/hr and titrate upwards as pt tolerates to goal rate of 65 mL/hr x 24 hrs; EN Regimen at goal provides 1560 mL total volume, 1872 kcal/day, 93.6 g pro/day, 1255 mL free water/day; provides ~28 kcal/kg and ~1.4 g/kg; based on current dosing weight 67.7 kg. Defer free water flushes to team.           [X] RD remains available upon request for TF formulary/rate adjustments prn.   [X] As medically feasible, consider the following micronutrient supplementation           [X] Add multivitamin and folic acid; continue with thiamine secondary to hx of EtOH use           [X] Add vitamin C to aid in wound healing as medically feasible  [X] Consider adding Talat 2x/d via PEG to aid in wound healing as medically feasible     [] Achieved - Continue current nutrition intervention(s)  [] Current medical condition precludes nutrition intervention at this time.    MONITORING AND EVALUATION:   ALBERTA remains available upon request and will follow up per protocol.    Jessica Villegas RD  Available on MS TEAMS. NUTRITION FOLLOW UP NOTE    PATIENT SEEN FOR: RD consulted for Tube Feeding    SOURCE: [X] Patient  [x] Current Medical Record  [X] RN  [] Family/support person at bedside  [] Patient unavailable/inappropriate  [] Other:    CHART REVIEWED/EVENTS NOTED.  [] No changes to nutrition care plan to note  [x] Nutrition Status:  - S/p PEG placement (10/16)       DIET ORDER:   Diet, NPO with Tube Feed:   Tube Feeding Modality: Gastrostomy  Glucerna 1.2 Ronaldo (GLUCERNARTH)  Total Volume for 24 Hours (mL): 1440  Continuous  Starting Tube Feed Rate {mL per Hour}: 10  Increase Tube Feed Rate by (mL): 10     Every 4 hours  Until Goal Tube Feed Rate (mL per Hour): 60  Tube Feed Duration (in Hours): 24  Tube Feed Start Time: 17:00 (10-17-24)    CURRENT DIET ORDER:  [] PO:  [X] Enteral Nutrition:  - Reports tolerating current EN regimen via NG tube with Glucerna 1.2 @ 40 mL/hr. Per RN, attempted to titrate upwards to 50 mL/hr this AM, however, pt reporting abdominal pain so returned to previous rate of 40 mL/hr.   - EN Regimen at goal provides 1440mL total volume, 1728 kcal/day, 86 g pro/day, 1159 mL free water/day; provides 25.5 kcal/kg and 1.27 g/kg; based on current dosing weight 67.7 kg.     Protein Modular(s) Provides: N/A    Current Pump Rate: 40 mL/hr  Unable to assess EN provision at this time secondary to lack of documentation  [] Parenteral Nutrition:    ANTHROPOMETRICS:  Drug Dosing Weight  Height (cm): 175.3 (per [facility])  Weight (kg): 67.7 (04 Oct 2024 22:19)  BMI (kg/m^2): 22.1 (based on updated height)    Weights:   Daily wts in k.2 (10/11, bed), 67.7 (10/4, bed).   RD will continue to monitor wt trends as able/available.     NUTRITIONALLY PERTINENT MEDICATIONS:  MEDICATIONS  (STANDING):  aMIOdarone    Tablet  amLODIPine   Tablet  atorvastatin  dextrose 5%.  dextrose 5%.  dextrose 50% Injectable  dextrose 50% Injectable  dextrose 50% Injectable  dextrose Oral Gel  glucagon  Injectable  insulin lispro (ADMELOG) corrective regimen sliding scale  insulin lispro (ADMELOG) corrective regimen sliding scale  metoprolol tartrate  polyethylene glycol 3350  thiamine       NUTRITIONALLY PERTINENT LABS:  10-18 Na135 mmol/L Glu 104 mg/dL[H] K+ 3.9 mmol/L Cr  0.65 mg/dL BUN 12 mg/dL 10-18 Phos 3.9 mg/dL 10-18 Alb 3.8 g/dL 10-10 Chol 159 mg/dL LDL --    HDL 34 mg/dL[L] Trig 114 mg/dL10-18 ALT 13 U/L AST 11 U/L Alkaline Phosphatase 96 U/L  10-05-24 @ 14:44 a1c 6.1    A1C with Estimated Average Glucose Result: 6.1 % (10-05-24 @ 14:44)    Finger Sticks:  POCT Blood Glucose.: 127 mg/dL (10-18 @ 12:28)  POCT Blood Glucose.: 124 mg/dL (10-18 @ 08:14)  POCT Blood Glucose.: 94 mg/dL (10-18 @ 00:15)  POCT Blood Glucose.: 98 mg/dL (10-17 @ 17:59)      NUTRITIONALLY PERTINENT MEDICATIONS/LABS:  [x] Reviewed  [X] Relevant notes on medications/labs:  - POCTs x 24 hrs WNL. Ordered for SSI.   - Ordered for thiamine supplementation.     EDEMA:  [x] Reviewed  [] Relevant notes:    GI/ I&O:  [x] Reviewed  [X] Relevant notes: Last BM 10/17 per RN. Ordered for Miralax.   [X] Other: Ordered for PPI.     SKIN:   [] No pressure injuries documented, per nursing flowsheet  [] Pressure injury previously noted  [x] Change in pressure injury documentation: Per wound care (10/17), pt with "Right elbow wound pressure injury vs. traumatic wound"  [] Other:    ESTIMATED NEEDS:  [] No change:  [X] Updated:  Energy:  1896 - 2234 kcal/day (28 - 33 kcal/kg)  Protein:  81 - 94.5 g/day (1.2 - 1.4 g/kg)  Fluid:   ml/day or [x] defer to team  Based on: dosing wt of 67.7 kg (10/4)    NUTRITION DIAGNOSIS:  [X] Prior Dx: (1) Moderate Protein Calorie Malnutrition in Context of Acute Illness  [X] New Dx: Increased Nutrient (Protein-micronutrient) Needs related to increased physiological demands as evidenced by R elbow pressure injury vs traumatic wound per wound care  Goal: Pt will consume >/= 75% of estimated nutrient needs via best tolerated route.     EDUCATION:  [] Yes:  [X] Not appropriate/warranted    NUTRITION CARE PLAN:  [X] To better meet EER, advance EN regimen via PEG tube as follows: Glucerna 1.2 @ 40 mL/hr and titrate upwards as pt tolerates to goal rate of 65 mL/hr x 24 hrs; EN Regimen at goal provides 1560 mL total volume, 1872 kcal/day, 93.6 g pro/day, 1255 mL free water/day; provides ~28 kcal/kg and ~1.4 g/kg; based on current dosing weight 67.7 kg. Defer free water flushes to team.           [X] RD remains available upon request for TF formulary/rate adjustments prn.   [X] As medically feasible, consider the following micronutrient supplementation           [X] Add multivitamin and folic acid; continue with thiamine secondary to hx of EtOH use           [X] Add vitamin C to aid in wound healing as medically feasible  [X] Consider adding Talat 2x/d via PEG to aid in wound healing as medically feasible     [] Achieved - Continue current nutrition intervention(s)  [] Current medical condition precludes nutrition intervention at this time.    MONITORING AND EVALUATION:   ALBERTA remains available upon request and will follow up per protocol.    Jessica Villegas RD  Available on MS TEAMS.

## 2024-10-18 NOTE — PROGRESS NOTE ADULT - PROBLEM SELECTOR PLAN 2
# RESOLVED    Hospital course at [facility] c/b AHRF s/p intubation on 9/18 for likely benzodiazepine overdose, extubated on 9/19. Diffuse rhonchi with copious secretions on exam. CTA chest (10/4) -> Large PE of R main pulmonary artery, scattered GGOs and interstitial changes predominantly in R and L lower lobes. Likely PE +/- superimposed aspiration PNA s/p empiric Zosyn for 7d (10/5-10/12).    - C/w management of PE, as above  - On RA

## 2024-10-18 NOTE — PROGRESS NOTE ADULT - PROBLEM SELECTOR PLAN 4
- Initially presented to [facility] on 9/14 for a mechanical fall after he tripped and fell onto his R shoulder  - XR showing R proximal humerus fracture, ORIF with Orthopedic Surgery at [facility] cancelled due to MICU course  > Orthopedic surgery on 10/7 said do outpt surgery.

## 2024-10-18 NOTE — PROGRESS NOTE ADULT - ASSESSMENT
Mr. Jarrett Overton is a 69-year-old w/ PMH of HTN, T2DM, CVA, A. flutter, CAD s/p CABG x3 (~2018), and alcohol use disorder who initially presented to [facility] on 9/14 for a mechanical fall. On 10/4, patient was found to be hypoxic with subsequent CTA chest showing large PE of R main pulmonary artery. US of b/l LEs was further significant for b/l DVTs in the R peroneal vein and L popliteal, tibial trunk, peroneal, and posterior tibial veins. Patient was transferred to [facility] for further management of PE and tx of superimposed PNA, now pending PEG placement, dispo [facility].

## 2024-10-18 NOTE — PROGRESS NOTE ADULT - ATTENDING COMMENTS
69 year old male, with PMH of HTN, DM2, ?CVA/TIA, Aflutter, CAD s/p CABG (2018), EtOH use disorder (two 40oz beers daily), current smoker (8 cigs/day), L total hip arthroplasty, initially admitted to [facility] (9/14/24-10/4/24) after mech fall c/b R prox humeral fx and EtOH withdrawal. Was on CIWA with Librium/Ativan PRN. TTE on 9/15/24 showed LVEF 60-65%, grad I DD, and an aortic valve prosthesis with normal function. Course c/b AMS, acute hypoxemic/hypercapnic respiratory, and agonal breathing on 9/18/24. Was initially given flumazenil x2 with some improvement, but was intubated for airway protection given excessive secretions and transferred to MICU 9/18/24. CTA chest was somewhat limited study but did not find PE at the time, noted RUL GGOs "suspicious for atypical or viral infection." CTH showed subacute and chronic SDHs as well as chronic lacunar infarct within L lentiform nucleus. Repeat CT showed stable SDHs. Extubated on 9/19/24. Pt was downgraded to Medicine floors on 9/21/24. Noted to have loss of voice and dysphagia. Evaluated by S+S, found dysphagia on video flouroscopy to all textures and recommended for PEG tube, but pt refused and opted for NGT placement. Ortho surgery that was planned for humeral fx was cancelled and ultimately recommended for conservative management only. CXR on 10/3/24 showed new LLL infiltrate, started on Zosyn. On 10/4/24, pt desatted on supplemental O2, CTA chest found massive PE in R main bronchus with pulm trunk dilatation. BLE duplex also found b/l LE DVTs (L>R). Started on hep gtt and transferred to [facility] for embolectomy evaluation.      #Acute hypoxemic respiratory failure  #R main bronchus PE, b/l LE DVTs  #?PNA  #CVA  #Dysphagia  #R humeral fx  #Chronic aflutter  #DM2  #EtOH/tobacco use disorder    - on 2L but saturating well; wean O2 as tolerated   - completed 7 day course of zosyn   - c/w amio and metoprolol (was on at OSH)  - c/w advair BID and duonebs q6h PRN  - c/w low ISS for now, monitor FS  - monitor on telemetry  - MRI head showing acute/subacute infarcts; neuro recs appreciated; f/u MG labs   - statin, thiamine  - TTE no acute findings   - speech and swallow, NPO, FEES -> did not pass study   - transitioned to eliquis 10mg BID for 7 days (including heparin drip days); was on heparin drip for planned PEG tube placement; now back to eliquis 5mg BID  - ortho - outpatient f/u   - started on NG feeds 10/5 but patient pulled out NGT 10/6 overnight; ENT replaced NGT on 10/7. s/p PEG placement 10/16; tolerated procedure well; tube feeds started; awaiting to reach goal rate but currently tolerating well; dietitian f/u for further recs on tube feeds    - dispo planning now; CM f/u for dc to [facility]; sister gave choices for [facility]      Rest as above. Discussed with HS. 69 year old male, with PMH of HTN, DM2, ?CVA/TIA, Aflutter, CAD s/p CABG (2018), EtOH use disorder (two 40oz beers daily), current smoker (8 cigs/day), L total hip arthroplasty, initially admitted to [facility] (9/14/24-10/4/24) after mech fall c/b R prox humeral fx and EtOH withdrawal. Was on CIWA with Librium/Ativan PRN. TTE on 9/15/24 showed LVEF 60-65%, grad I DD, and an aortic valve prosthesis with normal function. Course c/b AMS, acute hypoxemic/hypercapnic respiratory, and agonal breathing on 9/18/24. Was initially given flumazenil x2 with some improvement, but was intubated for airway protection given excessive secretions and transferred to MICU 9/18/24. CTA chest was somewhat limited study but did not find PE at the time, noted RUL GGOs "suspicious for atypical or viral infection." CTH showed subacute and chronic SDHs as well as chronic lacunar infarct within L lentiform nucleus. Repeat CT showed stable SDHs. Extubated on 9/19/24. Pt was downgraded to Medicine floors on 9/21/24. Noted to have loss of voice and dysphagia. Evaluated by S+S, found dysphagia on video flouroscopy to all textures and recommended for PEG tube, but pt refused and opted for NGT placement. Ortho surgery that was planned for humeral fx was cancelled and ultimately recommended for conservative management only. CXR on 10/3/24 showed new LLL infiltrate, started on Zosyn. On 10/4/24, pt desatted on supplemental O2, CTA chest found massive PE in R main bronchus with pulm trunk dilatation. BLE duplex also found b/l LE DVTs (L>R). Started on hep gtt and transferred to [facility] for embolectomy evaluation.      #Acute hypoxemic respiratory failure  #R main bronchus PE, b/l LE DVTs  #?PNA  #CVA  #Dysphagia  #R humeral fx  #Chronic aflutter  #DM2  #EtOH/tobacco use disorder    - on 2L but saturating well; wean O2 as tolerated   - completed 7 day course of zosyn   - c/w amio and metoprolol (was on at OSH)  - c/w advair BID and duonebs q6h PRN  - c/w low ISS for now, monitor FS  - monitor on telemetry  - MRI head showing acute/subacute infarcts; neuro recs appreciated; f/u MG labs   - statin, thiamine  - TTE no acute findings   - speech and swallow, NPO, FEES -> did not pass study   - transitioned to eliquis 10mg BID for 7 days (including heparin drip days); was on heparin drip for planned PEG tube placement; now back to eliquis 5mg BID  - ortho - outpatient f/u   - started on NG feeds 10/5 but patient pulled out NGT 10/6 overnight; ENT replaced NGT on 10/7. s/p PEG placement 10/16; tolerated procedure well; tube feeds started; awaiting to reach goal rate but currently tolerating well; dietitian f/u for further recs on tube feeds    - GOC: DNR/trial of intubation; MOLST form drafted and placed in chart   - dispo planning now; CM f/u for dc to [facility]; sister gave choices for [facility]      Rest as above. Discussed with HS.

## 2024-10-18 NOTE — PROGRESS NOTE ADULT - SUBJECTIVE AND OBJECTIVE BOX
INTERVAL HPI/OVERNIGHT EVENTS:  Pt seen and examined at bedside. No acute overnight events or complaints.    Brief Daily Plan:  - restarted eliquis 5mg bid and tube feeds yesterday  -     VITAL SIGNS:  T(F): 98.3 (10-18-24 @ 05:31)  HR: 75 (10-18-24 @ 05:31)  BP: 110/69 (10-18-24 @ 05:31)  RR: 18 (10-18-24 @ 05:31)  SpO2: 99% (10-18-24 @ 05:31)  Wt(kg): --    PHYSICAL EXAM:    CONSTITUTIONAL: NAD, sleeping comfortably, AAOx1 ("bank," "2004)  HEENT: MMM  RESPIRATORY: Normal respiratory effort; lungs are clear to auscultation bilaterally  CARDIOVASCULAR: Regular rate and rhythm, normal S1 and S2, +systolic murmur  ABDOMEN: Nontender to palpation, normoactive bowel sounds, no rebound/guarding  MUSCULOSKELETAL:  Moving limbs spontaneously; RUE in sling; No lower extremity edema  PSYCH: Affect appropriate, pleasant    MEDICATIONS  (STANDING):  aMIOdarone    Tablet 200 milliGRAM(s) Oral daily  amLODIPine   Tablet 10 milliGRAM(s) Oral daily  apixaban 5 milliGRAM(s) Enteral Tube every 12 hours  atorvastatin 40 milliGRAM(s) Oral at bedtime  dextrose 5%. 1000 milliLiter(s) (50 mL/Hr) IV Continuous <Continuous>  dextrose 5%. 1000 milliLiter(s) (100 mL/Hr) IV Continuous <Continuous>  dextrose 50% Injectable 25 Gram(s) IV Push once  dextrose 50% Injectable 12.5 Gram(s) IV Push once  dextrose 50% Injectable 25 Gram(s) IV Push once  dextrose Oral Gel 15 Gram(s) Oral once  fluticasone propionate/ salmeterol 250-50 MICROgram(s) Diskus 1 Dose(s) Inhalation two times a day  glucagon  Injectable 1 milliGRAM(s) IntraMuscular once  insulin lispro (ADMELOG) corrective regimen sliding scale   SubCutaneous at bedtime  insulin lispro (ADMELOG) corrective regimen sliding scale   SubCutaneous every 6 hours  metoprolol tartrate 25 milliGRAM(s) Oral two times a day  pantoprazole  Injectable 40 milliGRAM(s) IV Push daily  polyethylene glycol 3350 17 Gram(s) Oral daily  thiamine 100 milliGRAM(s) Oral daily    MEDICATIONS  (PRN):  albuterol/ipratropium for Nebulization 3 milliLiter(s) Nebulizer every 6 hours PRN Shortness of Breath and/or Wheezing      Allergies    No Known Allergies    Intolerances        LABS:                        9.9    9.30  )-----------( 159      ( 17 Oct 2024 07:27 )             31.7     10-17    136  |  98  |  13  ----------------------------<  345[H]  3.9   |  25  |  0.71    Ca    9.7      17 Oct 2024 07:28  Phos  4.0     10-17  Mg     1.8     10-17        Urinalysis Basic - ( 17 Oct 2024 07:28 )    Color: x / Appearance: x / SG: x / pH: x  Gluc: 345 mg/dL / Ketone: x  / Bili: x / Urobili: x   Blood: x / Protein: x / Nitrite: x   Leuk Esterase: x / RBC: x / WBC x   Sq Epi: x / Non Sq Epi: x / Bacteria: x        RADIOLOGY & ADDITIONAL TESTS:  Reviewed  ******************  Authored By: Henok Nava MD, PGY1  MS Teams Preferred  ******************   INTERVAL HPI/OVERNIGHT EVENTS:  Pt seen and examined at bedside. No acute overnight events or complaints.    Brief Daily Plan:  - restarted Eliquis 5mg bid and tube feeds yesterday  - Tolerated feeds yesterday.     VITAL SIGNS:  T(F): 98.3 (10-18-24 @ 05:31)  HR: 75 (10-18-24 @ 05:31)  BP: 110/69 (10-18-24 @ 05:31)  RR: 18 (10-18-24 @ 05:31)  SpO2: 99% (10-18-24 @ 05:31)  Wt(kg): --    PHYSICAL EXAM:    CONSTITUTIONAL: NAD, comfortable, AAOx3  HEENT: MMM  RESPIRATORY: Normal respiratory effort; lungs are clear to auscultation bilaterally  CARDIOVASCULAR: Regular rate and rhythm, normal S1 and S2, +systolic murmur  ABDOMEN: Nontender to palpation, normoactive bowel sounds, no rebound/guarding, PEG tube in place no erythema pus or tenderness  MUSCULOSKELETAL:  Moving limbs spontaneously; RUE in sling; No lower extremity edema  PSYCH: Affect appropriate, pleasant    MEDICATIONS  (STANDING):  aMIOdarone    Tablet 200 milliGRAM(s) Oral daily  amLODIPine   Tablet 10 milliGRAM(s) Oral daily  apixaban 5 milliGRAM(s) Enteral Tube every 12 hours  atorvastatin 40 milliGRAM(s) Oral at bedtime  dextrose 5%. 1000 milliLiter(s) (50 mL/Hr) IV Continuous <Continuous>  dextrose 5%. 1000 milliLiter(s) (100 mL/Hr) IV Continuous <Continuous>  dextrose 50% Injectable 25 Gram(s) IV Push once  dextrose 50% Injectable 12.5 Gram(s) IV Push once  dextrose 50% Injectable 25 Gram(s) IV Push once  dextrose Oral Gel 15 Gram(s) Oral once  fluticasone propionate/ salmeterol 250-50 MICROgram(s) Diskus 1 Dose(s) Inhalation two times a day  glucagon  Injectable 1 milliGRAM(s) IntraMuscular once  insulin lispro (ADMELOG) corrective regimen sliding scale   SubCutaneous at bedtime  insulin lispro (ADMELOG) corrective regimen sliding scale   SubCutaneous every 6 hours  metoprolol tartrate 25 milliGRAM(s) Oral two times a day  pantoprazole  Injectable 40 milliGRAM(s) IV Push daily  polyethylene glycol 3350 17 Gram(s) Oral daily  thiamine 100 milliGRAM(s) Oral daily    MEDICATIONS  (PRN):  albuterol/ipratropium for Nebulization 3 milliLiter(s) Nebulizer every 6 hours PRN Shortness of Breath and/or Wheezing      Allergies    No Known Allergies    Intolerances        LABS:                        9.9    9.30  )-----------( 159      ( 17 Oct 2024 07:27 )             31.7     10-17    136  |  98  |  13  ----------------------------<  345[H]  3.9   |  25  |  0.71    Ca    9.7      17 Oct 2024 07:28  Phos  4.0     10-17  Mg     1.8     10-17        Urinalysis Basic - ( 17 Oct 2024 07:28 )    Color: x / Appearance: x / SG: x / pH: x  Gluc: 345 mg/dL / Ketone: x  / Bili: x / Urobili: x   Blood: x / Protein: x / Nitrite: x   Leuk Esterase: x / RBC: x / WBC x   Sq Epi: x / Non Sq Epi: x / Bacteria: x        RADIOLOGY & ADDITIONAL TESTS:  Reviewed  ******************  Authored By: Henok Nava MD, PGY1  MS Teams Preferred  ******************   INTERVAL HPI/OVERNIGHT EVENTS:  Pt seen and examined at bedside. No acute overnight events or complaints.    Brief Daily Plan:  - restarted Eliquis 5mg bid and tube feeds yesterday  - Tolerated feeds at 40 ml/h, goal is 60 ml/h by tonight    VITAL SIGNS:  T(F): 98.3 (10-18-24 @ 05:31)  HR: 75 (10-18-24 @ 05:31)  BP: 110/69 (10-18-24 @ 05:31)  RR: 18 (10-18-24 @ 05:31)  SpO2: 99% (10-18-24 @ 05:31)  Wt(kg): --    PHYSICAL EXAM:    CONSTITUTIONAL: NAD, comfortable, AAOx3  HEENT: MMM  RESPIRATORY: Normal respiratory effort; lungs are clear to auscultation bilaterally  CARDIOVASCULAR: Regular rate and rhythm, normal S1 and S2, +systolic murmur  ABDOMEN: Nontender to palpation, normoactive bowel sounds, no rebound/guarding, PEG tube in place no erythema pus or tenderness  MUSCULOSKELETAL:  Moving limbs spontaneously; RUE in sling; No lower extremity edema  PSYCH: Affect appropriate, pleasant    MEDICATIONS  (STANDING):  aMIOdarone    Tablet 200 milliGRAM(s) Oral daily  amLODIPine   Tablet 10 milliGRAM(s) Oral daily  apixaban 5 milliGRAM(s) Enteral Tube every 12 hours  atorvastatin 40 milliGRAM(s) Oral at bedtime  dextrose 5%. 1000 milliLiter(s) (50 mL/Hr) IV Continuous <Continuous>  dextrose 5%. 1000 milliLiter(s) (100 mL/Hr) IV Continuous <Continuous>  dextrose 50% Injectable 25 Gram(s) IV Push once  dextrose 50% Injectable 12.5 Gram(s) IV Push once  dextrose 50% Injectable 25 Gram(s) IV Push once  dextrose Oral Gel 15 Gram(s) Oral once  fluticasone propionate/ salmeterol 250-50 MICROgram(s) Diskus 1 Dose(s) Inhalation two times a day  glucagon  Injectable 1 milliGRAM(s) IntraMuscular once  insulin lispro (ADMELOG) corrective regimen sliding scale   SubCutaneous at bedtime  insulin lispro (ADMELOG) corrective regimen sliding scale   SubCutaneous every 6 hours  metoprolol tartrate 25 milliGRAM(s) Oral two times a day  pantoprazole  Injectable 40 milliGRAM(s) IV Push daily  polyethylene glycol 3350 17 Gram(s) Oral daily  thiamine 100 milliGRAM(s) Oral daily    MEDICATIONS  (PRN):  albuterol/ipratropium for Nebulization 3 milliLiter(s) Nebulizer every 6 hours PRN Shortness of Breath and/or Wheezing      Allergies    No Known Allergies    Intolerances        LABS:                        9.9    9.30  )-----------( 159      ( 17 Oct 2024 07:27 )             31.7     10-17    136  |  98  |  13  ----------------------------<  345[H]  3.9   |  25  |  0.71    Ca    9.7      17 Oct 2024 07:28  Phos  4.0     10-17  Mg     1.8     10-17        Urinalysis Basic - ( 17 Oct 2024 07:28 )    Color: x / Appearance: x / SG: x / pH: x  Gluc: 345 mg/dL / Ketone: x  / Bili: x / Urobili: x   Blood: x / Protein: x / Nitrite: x   Leuk Esterase: x / RBC: x / WBC x   Sq Epi: x / Non Sq Epi: x / Bacteria: x        RADIOLOGY & ADDITIONAL TESTS:  Reviewed  ******************  Authored By: eHnok Nava MD, PGY1  MS Teams Preferred  ******************

## 2024-10-18 NOTE — PROGRESS NOTE ADULT - PROBLEM SELECTOR PLAN 3
- US of b/l LEs at [facility] -> R peroneal vein and L popliteal, tibial trunk, peroneal, and posterior tibial DVTs  > Repeat US of b/l LEs showing mulitple DVTs  > off heparin now on eliquis 5mg bid and tube feeds

## 2024-10-18 NOTE — PROGRESS NOTE ADULT - PROBLEM SELECTOR PLAN 1
- Hospital course at [facility] c/b AHRF  - CTA chest (10/4) -> Large PE of R main pulmonary artery  - US of b/l LEs -> R peroneal vein and L popliteal, tibial trunk, peroneal, and posterior tibial DVTs  - EKG showing evidence of right heart strain, S1Q3T3 changes, T wave depressions in precordial leads; not present on EKG from 9/15  - TTE (9/15) unremarkable  - Troponin 92, BNP 7168 on transfer  > CTH not acutely concerning  >no longer on heparin Repeat TTE not concerning  > Vascular cardiology consulted rec heparin for now and eventual transition to DOAC  - Neurosurg consulted for AC management, they said okay to PTT 60-80(need to stop 6 hours before procedure and obtain cardiac clearance on Monday)'  - heparin gtt 14 --> 10 --> 11 ml/hr for aPTT 53.8 (goal 60-80), OFF heparin 10/17  - restarted eliquis 5mg bid 10/17 and tube feeds - Hospital course at [facility] c/b AHRF  - CTA chest (10/4) -> Large PE of R main pulmonary artery  - US of b/l LEs -> R peroneal vein and L popliteal, tibial trunk, peroneal, and posterior tibial DVTs  - EKG showing evidence of right heart strain, S1Q3T3 changes, T wave depressions in precordial leads; not present on EKG from 9/15  - TTE (9/15) unremarkable  - Troponin 92, BNP 7168 on transfer  > CTH not acutely concerning  >no longer on heparin Repeat TTE not concerning  > Vascular cardiology consulted rec heparin for now and eventual transition to DOAC  - Neurosurg consulted for AC management, they said okay to PTT 60-80(need to stop 6 hours before procedure and obtain cardiac clearance on Monday)'  - heparin gtt 14 --> 10 --> 11 ml/hr for aPTT 53.8 (goal 60-80), OFF heparin 10/17  - restarted eliquis 5mg bid 10/17 and tube feeds  - tolerating feeds at 40ml/h, goal of 60ml/h 10/18 tonight - Hospital course at [facility] c/b AHRF  - CTA chest (10/4) -> Large PE of R main pulmonary artery  - US of b/l LEs -> R peroneal vein and L popliteal, tibial trunk, peroneal, and posterior tibial DVTs  - EKG showing evidence of right heart strain, S1Q3T3 changes, T wave depressions in precordial leads; not present on EKG from 9/15  - TTE (9/15) unremarkable  - Troponin 92, BNP 7168 on transfer  > CTH not acutely concerning  >no longer on heparin Repeat TTE not concerning  > Vascular cardiology consulted rec heparin for now and eventual transition to DOAC  - Neurosurg consulted for AC management, they said okay to PTT 60-80(need to stop 6 hours before procedure and obtain cardiac clearance on Monday)  - heparin gtt 14 --> 10 --> 11 ml/hr for aPTT 53.8 (goal 60-80), OFF heparin 10/17  - restarted eliquis 5mg bid 10/17 and tube feeds  - tolerating feeds at 40ml/h, goal of 60ml/h 10/18 tonight

## 2024-10-18 NOTE — PROGRESS NOTE ADULT - CONVERSATION DETAILS
September 12, 2019      [facility]  [address]  [facility] [address]  Phone: [phone]  Fax: [phone]       Patient: Aaliyah Angela   YOB: 1955  Date of Visit: 09/12/2019    To Whom It May Concern:    Heath Angela  was at [facility] on 09/12/2019. She may return to work/school on 09/12/2019 with no restrictions. If you have any questions or concerns, or if I can be of further assistance, please do not hesitate to contact me.    Sincerely,                LIVIER Meade      Sister, Jazz states wanting patient to be DNR/trial of intubation. MOLST form drafted and placed in chart.

## 2024-10-18 NOTE — PROGRESS NOTE ADULT - PROBLEM SELECTOR PLAN 7
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  - per neuro, ordered myasthenia gravis labs, outpatient f/u, can consider ENT/GI consult  - outpatient f/u  - s/p FEES 10/10, recommended to remain NPO  - GI consulted for PEG placement, cardiac clearance 10/14, pending CT noncontrast 10/15, PEG placement planned for 10/16  - PEG placed 10/16

## 2024-10-19 LAB
ANION GAP SERPL CALC-SCNC: 16 MMOL/L — SIGNIFICANT CHANGE UP (ref 5–17)
BUN SERPL-MCNC: 14 MG/DL — SIGNIFICANT CHANGE UP (ref 7–23)
CALCIUM SERPL-MCNC: 10.1 MG/DL — SIGNIFICANT CHANGE UP (ref 8.4–10.5)
CHLORIDE SERPL-SCNC: 94 MMOL/L — LOW (ref 96–108)
CO2 SERPL-SCNC: 27 MMOL/L — SIGNIFICANT CHANGE UP (ref 22–31)
CREAT SERPL-MCNC: 0.7 MG/DL — SIGNIFICANT CHANGE UP (ref 0.5–1.3)
EGFR: 101 ML/MIN/1.73M2 — SIGNIFICANT CHANGE UP
GLUCOSE BLDC GLUCOMTR-MCNC: 137 MG/DL — HIGH (ref 70–99)
GLUCOSE BLDC GLUCOMTR-MCNC: 143 MG/DL — HIGH (ref 70–99)
GLUCOSE BLDC GLUCOMTR-MCNC: 147 MG/DL — HIGH (ref 70–99)
GLUCOSE BLDC GLUCOMTR-MCNC: 154 MG/DL — HIGH (ref 70–99)
GLUCOSE SERPL-MCNC: 150 MG/DL — HIGH (ref 70–99)
HCT VFR BLD CALC: 34.9 % — LOW (ref 39–50)
HGB BLD-MCNC: 11 G/DL — LOW (ref 13–17)
MAGNESIUM SERPL-MCNC: 1.9 MG/DL — SIGNIFICANT CHANGE UP (ref 1.6–2.6)
MCHC RBC-ENTMCNC: 28.9 PG — SIGNIFICANT CHANGE UP (ref 27–34)
MCHC RBC-ENTMCNC: 31.5 GM/DL — LOW (ref 32–36)
MCV RBC AUTO: 91.6 FL — SIGNIFICANT CHANGE UP (ref 80–100)
NRBC # BLD: 0 /100 WBCS — SIGNIFICANT CHANGE UP (ref 0–0)
PHOSPHATE SERPL-MCNC: 3.7 MG/DL — SIGNIFICANT CHANGE UP (ref 2.5–4.5)
PLATELET # BLD AUTO: 172 K/UL — SIGNIFICANT CHANGE UP (ref 150–400)
POTASSIUM SERPL-MCNC: 3.8 MMOL/L — SIGNIFICANT CHANGE UP (ref 3.5–5.3)
POTASSIUM SERPL-SCNC: 3.8 MMOL/L — SIGNIFICANT CHANGE UP (ref 3.5–5.3)
RBC # BLD: 3.81 M/UL — LOW (ref 4.2–5.8)
RBC # FLD: 13.5 % — SIGNIFICANT CHANGE UP (ref 10.3–14.5)
SODIUM SERPL-SCNC: 137 MMOL/L — SIGNIFICANT CHANGE UP (ref 135–145)
WBC # BLD: 8.74 K/UL — SIGNIFICANT CHANGE UP (ref 3.8–10.5)
WBC # FLD AUTO: 8.74 K/UL — SIGNIFICANT CHANGE UP (ref 3.8–10.5)

## 2024-10-19 PROCEDURE — 99233 SBSQ HOSP IP/OBS HIGH 50: CPT | Mod: GC

## 2024-10-19 RX ADMIN — FLUTICASONE PROPIONATE AND SALMETEROL XINAFOATE 1 DOSE(S): 230; 21 AEROSOL, METERED RESPIRATORY (INHALATION) at 07:05

## 2024-10-19 RX ADMIN — APIXABAN 5 MILLIGRAM(S): 5 TABLET, FILM COATED ORAL at 06:32

## 2024-10-19 RX ADMIN — Medication 1: at 06:34

## 2024-10-19 RX ADMIN — Medication 40 MILLIGRAM(S): at 23:46

## 2024-10-19 RX ADMIN — Medication 100 MILLIGRAM(S): at 12:38

## 2024-10-19 RX ADMIN — PANTOPRAZOLE SODIUM 40 MILLIGRAM(S): 40 TABLET, DELAYED RELEASE ORAL at 12:38

## 2024-10-19 RX ADMIN — FLUTICASONE PROPIONATE AND SALMETEROL XINAFOATE 1 DOSE(S): 230; 21 AEROSOL, METERED RESPIRATORY (INHALATION) at 17:57

## 2024-10-19 RX ADMIN — Medication 25 MILLIGRAM(S): at 17:57

## 2024-10-19 RX ADMIN — Medication 200 MILLIGRAM(S): at 06:33

## 2024-10-19 RX ADMIN — Medication 25 MILLIGRAM(S): at 06:32

## 2024-10-19 RX ADMIN — APIXABAN 5 MILLIGRAM(S): 5 TABLET, FILM COATED ORAL at 17:57

## 2024-10-19 RX ADMIN — Medication 10 MILLIGRAM(S): at 06:32

## 2024-10-19 NOTE — PROGRESS NOTE ADULT - PROBLEM SELECTOR PLAN 10
DVT Ppx: eliquis 5mg bid   Diet: peg tube feeds   Dispo: subacute rehab DVT Ppx: Eliquis 5mg bid   Diet: PEG tube feeds   Dispo: [facility]

## 2024-10-19 NOTE — PROGRESS NOTE ADULT - SUBJECTIVE AND OBJECTIVE BOX
***********************************************  Kvng Dupree MD  Internal Medicine   PGY2  ***********************************************      PROGRESS NOTE:     Patient is a 69y old  Male who presents with a chief complaint of Pulmonary Embolism (18 Oct 2024 05:56)      SUBJECTIVE / OVERNIGHT EVENTS:    OVERNIGHT:       Pt seen in AM at bedside, resting comfortably in bed, and does not appear to be in any acute distress. When asked, pt denies any recent or active fever, chills, nausea, vomiting, headache, acute sob, chest pain, abdominal pain, genitourinary sx, extremity pain or swelling.          MEDICATIONS  (STANDING):  aMIOdarone    Tablet 200 milliGRAM(s) Oral daily  amLODIPine   Tablet 10 milliGRAM(s) Oral daily  apixaban 5 milliGRAM(s) Enteral Tube every 12 hours  atorvastatin 40 milliGRAM(s) Oral at bedtime  dextrose 5%. 1000 milliLiter(s) (100 mL/Hr) IV Continuous <Continuous>  dextrose 5%. 1000 milliLiter(s) (50 mL/Hr) IV Continuous <Continuous>  dextrose 50% Injectable 12.5 Gram(s) IV Push once  dextrose 50% Injectable 25 Gram(s) IV Push once  dextrose 50% Injectable 25 Gram(s) IV Push once  dextrose Oral Gel 15 Gram(s) Oral once  fluticasone propionate/ salmeterol 250-50 MICROgram(s) Diskus 1 Dose(s) Inhalation two times a day  glucagon  Injectable 1 milliGRAM(s) IntraMuscular once  insulin lispro (ADMELOG) corrective regimen sliding scale   SubCutaneous at bedtime  insulin lispro (ADMELOG) corrective regimen sliding scale   SubCutaneous every 6 hours  metoprolol tartrate 25 milliGRAM(s) Oral two times a day  pantoprazole   Suspension 40 milliGRAM(s) Oral daily  polyethylene glycol 3350 17 Gram(s) Oral daily  thiamine 100 milliGRAM(s) Oral daily    MEDICATIONS  (PRN):  albuterol/ipratropium for Nebulization 3 milliLiter(s) Nebulizer every 6 hours PRN Shortness of Breath and/or Wheezing      CAPILLARY BLOOD GLUCOSE      POCT Blood Glucose.: 154 mg/dL (19 Oct 2024 06:03)  POCT Blood Glucose.: 139 mg/dL (18 Oct 2024 23:54)  POCT Blood Glucose.: 123 mg/dL (18 Oct 2024 17:56)  POCT Blood Glucose.: 127 mg/dL (18 Oct 2024 12:28)    I&O's Summary    18 Oct 2024 07:01  -  19 Oct 2024 07:00  --------------------------------------------------------  IN: 685 mL / OUT: 550 mL / NET: 135 mL        PHYSICAL EXAM:  Vital Signs Last 24 Hrs  T(C): 36.8 (19 Oct 2024 06:09), Max: 36.9 (18 Oct 2024 13:45)  T(F): 98.2 (19 Oct 2024 06:09), Max: 98.5 (18 Oct 2024 21:23)  HR: 76 (19 Oct 2024 06:09) (71 - 82)  BP: 106/72 (19 Oct 2024 06:09) (97/66 - 108/73)  BP(mean): --  RR: 18 (19 Oct 2024 06:09) (18 - 18)  SpO2: 94% (19 Oct 2024 06:09) (94% - 99%)    Parameters below as of 19 Oct 2024 06:09  Patient On (Oxygen Delivery Method): room air        CONSTITUTIONAL: NAD; well-developed  HEENT: PERRL, clear conjunctiva  RESPIRATORY: Normal respiratory effort; lungs are clear to auscultation bilaterally; No Crackles/Rhonchi/Wheezing  CARDIOVASCULAR: Regular rate and rhythm, normal S1 and S2, no murmur/rub/gallop; No lower extremity edema; Peripheral pulses are 2+ bilaterally  ABDOMEN: Nontender to palpation, normoactive bowel sounds, no rebound/guarding; No hepatosplenomegaly  MUSCULOSKELETAL: no clubbing or cyanosis of digits; no joint swelling or tenderness to palpation  EXTREMITY: Lower extremities Non-tender to palpation; non-erythematous B/L  NEURO: A&Ox3; no focal deficits   PSYCH: normal mood; Affect appropirate    LABS:                        11.0   8.74  )-----------( 172      ( 19 Oct 2024 07:06 )             34.9     10-19    137  |  94[L]  |  14  ----------------------------<  150[H]  3.8   |  27  |  0.70    Ca    10.1      19 Oct 2024 07:06  Phos  3.7     10-19  Mg     1.9     10-19    TPro  7.5  /  Alb  3.8  /  TBili  0.6  /  DBili  x   /  AST  11  /  ALT  13  /  AlkPhos  96  10-18          Urinalysis Basic - ( 19 Oct 2024 07:06 )    Color: x / Appearance: x / SG: x / pH: x  Gluc: 150 mg/dL / Ketone: x  / Bili: x / Urobili: x   Blood: x / Protein: x / Nitrite: x   Leuk Esterase: x / RBC: x / WBC x   Sq Epi: x / Non Sq Epi: x / Bacteria: x          RADIOLOGY & ADDITIONAL TESTS:  Results Reviewed:   Imaging Personally Reviewed:  Electrocardiogram Personally Reviewed:    COORDINATION OF CARE:  Care Discussed with Consultants/Other Providers [Y/N]:  Prior or Outpatient Records Reviewed [Y/N]:   ***********************************************  Kvng Dupree MD  Internal Medicine   PGY2  ***********************************************      PROGRESS NOTE:     Patient is a 69y old  Male who presents with a chief complaint of Pulmonary Embolism (18 Oct 2024 05:56)      SUBJECTIVE / OVERNIGHT EVENTS:    OVERNIGHT: No overnight events       Pt seen in AM at bedside, resting comfortably in bed, and does not appear to be in any acute distress. no chest pain or respiratory distress. At baseline.  When asked, pt denies any recent or active fever, chills, nausea, vomiting, headache, acute sob, chest pain, abdominal pain, genitourinary sx, extremity pain or swelling.          MEDICATIONS  (STANDING):  aMIOdarone    Tablet 200 milliGRAM(s) Oral daily  amLODIPine   Tablet 10 milliGRAM(s) Oral daily  apixaban 5 milliGRAM(s) Enteral Tube every 12 hours  atorvastatin 40 milliGRAM(s) Oral at bedtime  dextrose 5%. 1000 milliLiter(s) (100 mL/Hr) IV Continuous <Continuous>  dextrose 5%. 1000 milliLiter(s) (50 mL/Hr) IV Continuous <Continuous>  dextrose 50% Injectable 12.5 Gram(s) IV Push once  dextrose 50% Injectable 25 Gram(s) IV Push once  dextrose 50% Injectable 25 Gram(s) IV Push once  dextrose Oral Gel 15 Gram(s) Oral once  fluticasone propionate/ salmeterol 250-50 MICROgram(s) Diskus 1 Dose(s) Inhalation two times a day  glucagon  Injectable 1 milliGRAM(s) IntraMuscular once  insulin lispro (ADMELOG) corrective regimen sliding scale   SubCutaneous at bedtime  insulin lispro (ADMELOG) corrective regimen sliding scale   SubCutaneous every 6 hours  metoprolol tartrate 25 milliGRAM(s) Oral two times a day  pantoprazole   Suspension 40 milliGRAM(s) Oral daily  polyethylene glycol 3350 17 Gram(s) Oral daily  thiamine 100 milliGRAM(s) Oral daily    MEDICATIONS  (PRN):  albuterol/ipratropium for Nebulization 3 milliLiter(s) Nebulizer every 6 hours PRN Shortness of Breath and/or Wheezing      CAPILLARY BLOOD GLUCOSE      POCT Blood Glucose.: 154 mg/dL (19 Oct 2024 06:03)  POCT Blood Glucose.: 139 mg/dL (18 Oct 2024 23:54)  POCT Blood Glucose.: 123 mg/dL (18 Oct 2024 17:56)  POCT Blood Glucose.: 127 mg/dL (18 Oct 2024 12:28)    I&O's Summary    18 Oct 2024 07:01  -  19 Oct 2024 07:00  --------------------------------------------------------  IN: 685 mL / OUT: 550 mL / NET: 135 mL        PHYSICAL EXAM:  Vital Signs Last 24 Hrs  T(C): 36.8 (19 Oct 2024 06:09), Max: 36.9 (18 Oct 2024 13:45)  T(F): 98.2 (19 Oct 2024 06:09), Max: 98.5 (18 Oct 2024 21:23)  HR: 76 (19 Oct 2024 06:09) (71 - 82)  BP: 106/72 (19 Oct 2024 06:09) (97/66 - 108/73)  BP(mean): --  RR: 18 (19 Oct 2024 06:09) (18 - 18)  SpO2: 94% (19 Oct 2024 06:09) (94% - 99%)    Parameters below as of 19 Oct 2024 06:09  Patient On (Oxygen Delivery Method): room air        CONSTITUTIONAL: NAD; well-developed  HEENT: PERRL, clear conjunctiva  RESPIRATORY: Normal respiratory effort; lungs are clear to auscultation bilaterally; No Crackles/Rhonchi/Wheezing  CARDIOVASCULAR: Regular rate and rhythm, normal S1 and S2, no murmur/rub/gallop; No lower extremity edema; Peripheral pulses are 2+ bilaterally  ABDOMEN: Nontender to palpation, normoactive bowel sounds, no rebound/guarding; No hepatosplenomegaly  MUSCULOSKELETAL: no clubbing or cyanosis of digits; no joint swelling or tenderness to palpation  EXTREMITY: Lower extremities Non-tender to palpation; non-erythematous B/L  NEURO: A&Ox3; no focal deficits   PSYCH: normal mood; Affect appropirate    LABS:                        11.0   8.74  )-----------( 172      ( 19 Oct 2024 07:06 )             34.9     10-19    137  |  94[L]  |  14  ----------------------------<  150[H]  3.8   |  27  |  0.70    Ca    10.1      19 Oct 2024 07:06  Phos  3.7     10-19  Mg     1.9     10-19    TPro  7.5  /  Alb  3.8  /  TBili  0.6  /  DBili  x   /  AST  11  /  ALT  13  /  AlkPhos  96  10-18          Urinalysis Basic - ( 19 Oct 2024 07:06 )    Color: x / Appearance: x / SG: x / pH: x  Gluc: 150 mg/dL / Ketone: x  / Bili: x / Urobili: x   Blood: x / Protein: x / Nitrite: x   Leuk Esterase: x / RBC: x / WBC x   Sq Epi: x / Non Sq Epi: x / Bacteria: x          RADIOLOGY & ADDITIONAL TESTS:  Results Reviewed:   Imaging Personally Reviewed:  Electrocardiogram Personally Reviewed:    COORDINATION OF CARE:  Care Discussed with Consultants/Other Providers [Y/N]:  Prior or Outpatient Records Reviewed [Y/N]:

## 2024-10-19 NOTE — PROGRESS NOTE ADULT - TIME BILLING
education, assessment and coordination of care, separate from time spent with fellow.
- Reviewing, and interpreting labs, testing, and imaging.  - Independently obtaining a review of systems and performing a physical exam  - Reviewing prior records and where necessary, outpatient records.  - Counselling and educating patient regarding interpretation of aforementioned items and plan of care.  - Does not include time spent on resident education.

## 2024-10-19 NOTE — PROGRESS NOTE ADULT - PROBLEM SELECTOR PLAN 7
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  - per neuro, ordered myasthenia gravis labs, outpatient f/u, can consider ENT/GI consult  - outpatient f/u  - s/p FEES 10/10, recommended to remain NPO  - GI consulted for PEG placement, cardiac clearance 10/14, pending CT noncontrast 10/15, PEG placement planned for 10/16  - PEG placed 10/16 - Significant dysphagia s/p extubation on 9/19  - Patient declined PEG tube, NG tube placed instead, pt pulled out, now replaced  > Failed bedside S&S eval w/ laryngeal aspiration, rec'd NPO and FEES  > 10/8 MRI brain: Small acute/subacute infarcts within the right cerebellar   hemisphere and left posterior centrum semiovale with associated cytotoxic   edema.  - Per neuro, ordered myasthenia gravis labs, outpatient f/u   - s/p FEES 10/10, recommended to remain NPO  - GI consulted for PEG placement  - PEG placed 10/16  - Tolerating feeds - now at goal

## 2024-10-19 NOTE — PROGRESS NOTE ADULT - PROBLEM SELECTOR PLAN 8
10/8 MRI brain: Small acute/subacute infarcts within the right cerebellar   hemisphere and left posterior centrum semiovale with associated cytotoxic   edema  -Neuro consulted  - statin 40  - target LDL <70, SBP <130  - start thiamine  -f/u neuro outpt 10/8 MRI brain: Small acute/subacute infarcts within the right cerebellar hemisphere and left posterior centrum semiovale with associated cytotoxic edema  -Neuro consulted  - Continue statin 40  - Target LDL <70, SBP <130  - Continue thiamine  -f/u neuro outpt

## 2024-10-19 NOTE — PROGRESS NOTE ADULT - PROBLEM SELECTOR PLAN 4
- Initially presented to [facility] on 9/14 for a mechanical fall after he tripped and fell onto his R shoulder  - XR showing R proximal humerus fracture, ORIF with Orthopedic Surgery at [facility] cancelled due to MICU course  > Orthopedic surgery on 10/7 said do outpt surgery. - Initially presented to [facility] on 9/14 for a mechanical fall after he tripped and fell onto his R shoulder  - XR showing R proximal humerus fracture, ORIF with Orthopedic Surgery at [facility] cancelled due to MICU course  > Orthopedic surgery on 10/7 said can follow-up outpt orthopedic surgery.

## 2024-10-19 NOTE — PROGRESS NOTE ADULT - PROBLEM SELECTOR PLAN 1
- Hospital course at [facility] c/b AHRF  - CTA chest (10/4) -> Large PE of R main pulmonary artery  - US of b/l LEs -> R peroneal vein and L popliteal, tibial trunk, peroneal, and posterior tibial DVTs  - EKG showing evidence of right heart strain, S1Q3T3 changes, T wave depressions in precordial leads; not present on EKG from 9/15  - TTE (9/15) unremarkable  - Troponin 92, BNP 7168 on transfer  > CTH not acutely concerning  >no longer on heparin Repeat TTE not concerning  > Vascular cardiology consulted rec heparin for now and eventual transition to DOAC  - Neurosurg consulted for AC management, they said okay to PTT 60-80(need to stop 6 hours before procedure and obtain cardiac clearance on Monday)  - heparin gtt 14 --> 10 --> 11 ml/hr for aPTT 53.8 (goal 60-80), OFF heparin 10/17  - restarted eliquis 5mg bid 10/17 and tube feeds  - tolerating feeds at 40ml/h, goal of 60ml/h 10/18 tonight - Hospital course at [facility] c/b AHRF  - CTA chest (10/4) -> Large PE of R main pulmonary artery  - US of b/l LEs -> R peroneal vein and L popliteal, tibial trunk, peroneal, and posterior tibial DVTs  - EKG showing evidence of right heart strain, S1Q3T3 changes, T wave depressions in precordial leads; not present on EKG from 9/15  - TTE (9/15) unremarkable  - Troponin 92, BNP 7168 on transfer  > CTH not acutely concerning  >no longer on heparin Repeat TTE not concerning  > Vascular cardiology consulted rec heparin for now and eventual transition to DOAC  - Neurosurg consulted for AC management, they said okay to PTT 60-80(need to stop 6 hours before procedure and obtain cardiac clearance on Monday)  - restarted eliquis 5mg bid 10/17 and tube feeds  - tolerating feeds at 40ml/h, goal of 60ml/h 10/18 tonight - Hospital course at [facility] c/b AHRF  - CTA chest (10/4) -> Large PE of R main pulmonary artery  - US of b/l LEs -> R peroneal vein and L popliteal, tibial trunk, peroneal, and posterior tibial DVTs  - EKG showing evidence of right heart strain, S1Q3T3 changes, T wave depressions in precordial leads; not present on EKG from 9/15  - TTE (9/15) unremarkable  - Troponin 92, BNP 7168 on transfer  - s/p Eliquis loading now on Eliquis 5mg BID 10/17

## 2024-10-19 NOTE — PROGRESS NOTE ADULT - PROBLEM SELECTOR PLAN 2
# RESOLVED    Hospital course at [facility] c/b AHRF s/p intubation on 9/18 for likely benzodiazepine overdose, extubated on 9/19. Diffuse rhonchi with copious secretions on exam. CTA chest (10/4) -> Large PE of R main pulmonary artery, scattered GGOs and interstitial changes predominantly in R and L lower lobes. Likely PE +/- superimposed aspiration PNA s/p empiric Zosyn for 7d (10/5-10/12).    - C/w management of PE, as above  - On RA # RESOLVED    Hospital course at [facility] c/b AHRF s/p intubation on 9/18 for likely benzodiazepine overdose, extubated on 9/19. Diffuse rhonchi with copious secretions on exam. CTA chest (10/4) -> Large PE of R main pulmonary artery, scattered GGOs and interstitial changes predominantly in R and L lower lobes. Likely PE +/- superimposed aspiration PNA s/p empiric Zosyn for 7d (10/5-10/12).    - C/w management of PE, as above  - Now on RA

## 2024-10-19 NOTE — PROGRESS NOTE ADULT - PROBLEM SELECTOR PLAN 3
- US of b/l LEs at [facility] -> R peroneal vein and L popliteal, tibial trunk, peroneal, and posterior tibial DVTs  > Repeat US of b/l LEs showing mulitple DVTs  > off heparin now on eliquis 5mg bid and tube feeds - US of b/l LEs at [facility] -> R peroneal vein and L popliteal, tibial trunk, peroneal, and posterior tibial DVTs  > Repeat US of b/l LEs showing mulitple DVTs  > off heparin, now on Eliquis 5mg BID

## 2024-10-19 NOTE — PROGRESS NOTE ADULT - ASSESSMENT
Mr. Jarrett Overton is a 69-year-old w/ PMH of HTN, T2DM, CVA, A. flutter, CAD s/p CABG x3 (~2018), and alcohol use disorder who initially presented to [facility] on 9/14 for a mechanical fall. On 10/4, patient was found to be hypoxic with subsequent CTA chest showing large PE of R main pulmonary artery. US of b/l LEs was further significant for b/l DVTs in the R peroneal vein and L popliteal, tibial trunk, peroneal, and posterior tibial veins. Patient was transferred to [facility] for further management of PE and tx of superimposed PNA, now pending PEG placement, dispo ROSALBA. Mr. Jarrett Overton is a 69-year-old w/ PMH of HTN, T2DM, CVA, A. flutter, CAD s/p CABG x3 (~2018), and alcohol use disorder who initially presented to [facility] on 9/14 for a mechanical fall. On 10/4, patient was found to be hypoxic with subsequent CTA chest showing large PE of R main pulmonary artery. US of b/l LEs was further significant for b/l DVTs in the R peroneal vein and L popliteal, tibial trunk, peroneal, and posterior tibial veins. Patient was transferred to [facility] for further management of PE and tx of superimposed PNA, now s/p PEG placement, dispo ROSALBA.

## 2024-10-19 NOTE — PROGRESS NOTE ADULT - ATTENDING COMMENTS
Prepped: left groin. Prepped with: ChloraPrep. The site was clipped. The patient was draped. 69 year old male, with PMH of HTN, DM2, ?CVA/TIA, Aflutter, CAD s/p CABG (2018), EtOH use disorder (two 40oz beers daily), current smoker (8 cigs/day), L total hip arthroplasty, initially admitted to [facility] (9/14/24-10/4/24) after mech fall c/b R prox humeral fx and EtOH withdrawal. Was on CIWA with Librium/Ativan PRN. TTE on 9/15/24 showed LVEF 60-65%, grad I DD, and an aortic valve prosthesis with normal function. Course c/b AMS, acute hypoxemic/hypercapnic respiratory, and agonal breathing on 9/18/24. Was initially given flumazenil x2 with some improvement, but was intubated for airway protection given excessive secretions and transferred to MICU 9/18/24. CTA chest was somewhat limited study but did not find PE at the time, noted RUL GGOs "suspicious for atypical or viral infection." CTH showed subacute and chronic SDHs as well as chronic lacunar infarct within L lentiform nucleus. Repeat CT showed stable SDHs. Extubated on 9/19/24. Pt was downgraded to Medicine floors on 9/21/24. Noted to have loss of voice and dysphagia. Evaluated by S+S, found dysphagia on video flouroscopy to all textures and recommended for PEG tube, but pt refused and opted for NGT placement. Ortho surgery that was planned for humeral fx was cancelled and ultimately recommended for conservative management only. CXR on 10/3/24 showed new LLL infiltrate, started on Zosyn. On 10/4/24, pt desaturated on supplemental O2, CTA chest found massive PE in R main bronchus with pulm trunk dilatation. BLE duplex also found b/l LE DVTs (L>R). Started on hep drip and transferred to [facility] for embolectomy evaluation.    #Acute hypoxemic respiratory failure  #R main bronchus PE, b/l LE DVTs  #PNA  #Acute/subacute CVA  #Dysphagia  #R humeral fx  #Chronic Aflutter  #DM2  #EtOH/tobacco use disorder    - Now on room air  - Completed 7 day course of zosyn   - c/w amiodaron and metoprolol (was on at OSH)  - c/w Advair BID and duonebs q6h PRN  - c/w low ISS for now, monitor FS  - Monitor on telemetry  - MRI head showing acute/subacute infarcts; neuro recs appreciated; f/u MG labs   - Continue statin, thiamine  - TTE with no acute findings   - Speech and swallow, NPO, FEES -> did not pass study   - transitioned to Eliquis 10mg BID for 7 days (including heparin drip days); was on heparin drip for planned PEG tube placement; now back to Eliquis 5mg BID  - Ortho - outpatient f/u   - started on NG feeds 10/5 but patient pulled out NGT 10/6 overnight; ENT replaced NGT on 10/7. s/p PEG placement 10/16; tolerated procedure well; tube feeds started; now at goal and tolerating dietitian f/u for further recs on tube feeds    - GOC: DNR/trial of intubation; MOLST form completed and placed in chart   - Stable for DC, CM f/u for dc to ROSAL[facility]; sister gave choices for ROSAL[facility], will need auth    Rest as above. Discussed with HS. 69 year old male, with PMH of HTN, DM2, ?CVA/TIA, Aflutter, CAD s/p CABG (2018), EtOH use disorder (two 40oz beers daily), current smoker (8 cigs/day), L total hip arthroplasty, initially admitted to [facility] (9/14/24-10/4/24) after mech fall c/b R prox humeral fx and EtOH withdrawal. Was on CIWA with Librium/Ativan PRN. TTE on 9/15/24 showed LVEF 60-65%, grad I DD, and an aortic valve prosthesis with normal function. Course c/b AMS, acute hypoxemic/hypercapnic respiratory, and agonal breathing on 9/18/24. Was initially given flumazenil x2 with some improvement, but was intubated for airway protection given excessive secretions and transferred to MICU 9/18/24. CTA chest was somewhat limited study but did not find PE at the time, noted RUL GGOs "suspicious for atypical or viral infection." CTH showed subacute and chronic SDHs as well as chronic lacunar infarct within L lentiform nucleus. Repeat CT showed stable SDHs. Extubated on 9/19/24. Pt was downgraded to Medicine floors on 9/21/24. Noted to have loss of voice and dysphagia. Evaluated by S+S, found dysphagia on video flouroscopy to all textures and recommended for PEG tube, but pt refused and opted for NGT placement. Ortho surgery that was planned for humeral fx was cancelled and ultimately recommended for conservative management only. CXR on 10/3/24 showed new LLL infiltrate, started on Zosyn. On 10/4/24, pt desaturated on supplemental O2, CTA chest found massive PE in R main bronchus with pulm trunk dilatation. BLE duplex also found b/l LE DVTs (L>R). Started on hep drip and transferred to [facility] for embolectomy evaluation.    #Acute hypoxemic respiratory failure  #R main bronchus PE, b/l LE DVTs  #PNA  #Acute/subacute CVA  #Dysphagia  #R humeral fx  #Chronic Aflutter  #DM2  #EtOH/tobacco use disorder    - Now on room air  - Completed 7 day course of Zosyn   - c/w amiodarone and metoprolol (was on at OSH)  - c/w Advair BID and duonebs q6h PRN  - c/w low ISS for now, monitor FS  - Monitor on telemetry  - MRI head showing acute/subacute infarcts; neuro recs appreciated; f/u MG labs   - Continue statin, thiamine  - TTE with no acute findings   - Speech and swallow, NPO, FEES -> did not pass study   - transitioned to Eliquis 10mg BID for 7 days (including heparin drip days); was on heparin drip for planned PEG tube placement; now back to Eliquis 5mg BID  - Ortho - outpatient f/u   - started on NG feeds 10/5 but patient pulled out NGT 10/6 overnight; ENT replaced NGT on 10/7. s/p PEG placement 10/16; tolerated procedure well; tube feeds started; now at goal and tolerating dietitian f/u for further recs on tube feeds    - GOC: DNR/trial of intubation; MOLST form completed and placed in chart   - Stable for DC, CM f/u for dc to [facility]; sister gave choices for ROSAL[facility], will need auth    Rest as above. Discussed with HS.

## 2024-10-20 LAB
ANION GAP SERPL CALC-SCNC: 13 MMOL/L — SIGNIFICANT CHANGE UP (ref 5–17)
BASOPHILS # BLD AUTO: 0.05 K/UL — SIGNIFICANT CHANGE UP (ref 0–0.2)
BASOPHILS NFR BLD AUTO: 0.6 % — SIGNIFICANT CHANGE UP (ref 0–2)
BUN SERPL-MCNC: 11 MG/DL — SIGNIFICANT CHANGE UP (ref 7–23)
CALCIUM SERPL-MCNC: 10 MG/DL — SIGNIFICANT CHANGE UP (ref 8.4–10.5)
CHLORIDE SERPL-SCNC: 96 MMOL/L — SIGNIFICANT CHANGE UP (ref 96–108)
CO2 SERPL-SCNC: 29 MMOL/L — SIGNIFICANT CHANGE UP (ref 22–31)
CREAT SERPL-MCNC: 0.62 MG/DL — SIGNIFICANT CHANGE UP (ref 0.5–1.3)
EGFR: 105 ML/MIN/1.73M2 — SIGNIFICANT CHANGE UP
EOSINOPHIL # BLD AUTO: 0.15 K/UL — SIGNIFICANT CHANGE UP (ref 0–0.5)
EOSINOPHIL NFR BLD AUTO: 1.9 % — SIGNIFICANT CHANGE UP (ref 0–6)
GLUCOSE BLDC GLUCOMTR-MCNC: 126 MG/DL — HIGH (ref 70–99)
GLUCOSE BLDC GLUCOMTR-MCNC: 138 MG/DL — HIGH (ref 70–99)
GLUCOSE BLDC GLUCOMTR-MCNC: 165 MG/DL — HIGH (ref 70–99)
GLUCOSE BLDC GLUCOMTR-MCNC: 180 MG/DL — HIGH (ref 70–99)
GLUCOSE SERPL-MCNC: 141 MG/DL — HIGH (ref 70–99)
HCT VFR BLD CALC: 32.8 % — LOW (ref 39–50)
HGB BLD-MCNC: 10.5 G/DL — LOW (ref 13–17)
IMM GRANULOCYTES NFR BLD AUTO: 0.3 % — SIGNIFICANT CHANGE UP (ref 0–0.9)
LYMPHOCYTES # BLD AUTO: 1.64 K/UL — SIGNIFICANT CHANGE UP (ref 1–3.3)
LYMPHOCYTES # BLD AUTO: 21.2 % — SIGNIFICANT CHANGE UP (ref 13–44)
MAGNESIUM SERPL-MCNC: 1.9 MG/DL — SIGNIFICANT CHANGE UP (ref 1.6–2.6)
MCHC RBC-ENTMCNC: 29.3 PG — SIGNIFICANT CHANGE UP (ref 27–34)
MCHC RBC-ENTMCNC: 32 GM/DL — SIGNIFICANT CHANGE UP (ref 32–36)
MCV RBC AUTO: 91.6 FL — SIGNIFICANT CHANGE UP (ref 80–100)
MONOCYTES # BLD AUTO: 0.94 K/UL — HIGH (ref 0–0.9)
MONOCYTES NFR BLD AUTO: 12.1 % — SIGNIFICANT CHANGE UP (ref 2–14)
NEUTROPHILS # BLD AUTO: 4.95 K/UL — SIGNIFICANT CHANGE UP (ref 1.8–7.4)
NEUTROPHILS NFR BLD AUTO: 63.9 % — SIGNIFICANT CHANGE UP (ref 43–77)
NRBC # BLD: 0 /100 WBCS — SIGNIFICANT CHANGE UP (ref 0–0)
PHOSPHATE SERPL-MCNC: 4.3 MG/DL — SIGNIFICANT CHANGE UP (ref 2.5–4.5)
PLATELET # BLD AUTO: 186 K/UL — SIGNIFICANT CHANGE UP (ref 150–400)
POTASSIUM SERPL-MCNC: 3.8 MMOL/L — SIGNIFICANT CHANGE UP (ref 3.5–5.3)
POTASSIUM SERPL-SCNC: 3.8 MMOL/L — SIGNIFICANT CHANGE UP (ref 3.5–5.3)
RBC # BLD: 3.58 M/UL — LOW (ref 4.2–5.8)
RBC # FLD: 13.5 % — SIGNIFICANT CHANGE UP (ref 10.3–14.5)
SODIUM SERPL-SCNC: 138 MMOL/L — SIGNIFICANT CHANGE UP (ref 135–145)
WBC # BLD: 7.75 K/UL — SIGNIFICANT CHANGE UP (ref 3.8–10.5)
WBC # FLD AUTO: 7.75 K/UL — SIGNIFICANT CHANGE UP (ref 3.8–10.5)

## 2024-10-20 PROCEDURE — 99232 SBSQ HOSP IP/OBS MODERATE 35: CPT | Mod: GC

## 2024-10-20 RX ORDER — ACETAMINOPHEN 500 MG
650 TABLET ORAL EVERY 6 HOURS
Refills: 0 | Status: DISCONTINUED | OUTPATIENT
Start: 2024-10-20 | End: 2024-10-24

## 2024-10-20 RX ADMIN — Medication 25 MILLIGRAM(S): at 17:11

## 2024-10-20 RX ADMIN — Medication 1: at 17:37

## 2024-10-20 RX ADMIN — APIXABAN 5 MILLIGRAM(S): 5 TABLET, FILM COATED ORAL at 06:28

## 2024-10-20 RX ADMIN — POLYETHYLENE GLYCOL 3350 17 GRAM(S): 17 POWDER, FOR SOLUTION ORAL at 11:22

## 2024-10-20 RX ADMIN — PANTOPRAZOLE SODIUM 40 MILLIGRAM(S): 40 TABLET, DELAYED RELEASE ORAL at 11:22

## 2024-10-20 RX ADMIN — Medication 1: at 12:03

## 2024-10-20 RX ADMIN — Medication 200 MILLIGRAM(S): at 06:28

## 2024-10-20 RX ADMIN — Medication 10 MILLIGRAM(S): at 06:28

## 2024-10-20 RX ADMIN — Medication 40 MILLIGRAM(S): at 22:39

## 2024-10-20 RX ADMIN — Medication 25 MILLIGRAM(S): at 06:28

## 2024-10-20 RX ADMIN — FLUTICASONE PROPIONATE AND SALMETEROL XINAFOATE 1 DOSE(S): 230; 21 AEROSOL, METERED RESPIRATORY (INHALATION) at 06:52

## 2024-10-20 RX ADMIN — APIXABAN 5 MILLIGRAM(S): 5 TABLET, FILM COATED ORAL at 17:11

## 2024-10-20 RX ADMIN — Medication 100 MILLIGRAM(S): at 11:22

## 2024-10-20 NOTE — PROGRESS NOTE ADULT - PROBLEM SELECTOR PLAN 3
- US of b/l LEs at [facility] -> R peroneal vein and L popliteal, tibial trunk, peroneal, and posterior tibial DVTs  > Repeat US of b/l LEs showing mulitple DVTs  > off heparin, now on Eliquis 5mg BID

## 2024-10-20 NOTE — PROGRESS NOTE ADULT - SUBJECTIVE AND OBJECTIVE BOX
INTERVAL HPI/OVERNIGHT EVENTS:  Pt seen and examined at bedside. No acute overnight events or complaints.    Brief Daily Plan:  -    VITAL SIGNS:  T(F): 98 (10-20-24 @ 04:58)  HR: 81 (10-20-24 @ 04:58)  BP: 114/76 (10-20-24 @ 04:58)  RR: 18 (10-20-24 @ 04:58)  SpO2: 98% (10-20-24 @ 04:58)  Wt(kg): --    PHYSICAL EXAM:    CONSTITUTIONAL: NAD, comfortable, AAOx3  HEENT: MMM  RESPIRATORY: Normal respiratory effort; lungs are clear to auscultation bilaterally  CARDIOVASCULAR: Regular rate and rhythm, normal S1 and S2, +systolic murmur  ABDOMEN: Nontender to palpation, normoactive bowel sounds, no rebound/guarding, PEG tube in place no erythema pus or tenderness  MUSCULOSKELETAL:  Moving limbs spontaneously; RUE in sling; No lower extremity edema  PSYCH: Affect appropriate, pleasant    MEDICATIONS  (STANDING):  aMIOdarone    Tablet 200 milliGRAM(s) Oral daily  amLODIPine   Tablet 10 milliGRAM(s) Oral daily  apixaban 5 milliGRAM(s) Enteral Tube every 12 hours  atorvastatin 40 milliGRAM(s) Oral at bedtime  dextrose 5%. 1000 milliLiter(s) (50 mL/Hr) IV Continuous <Continuous>  dextrose 5%. 1000 milliLiter(s) (100 mL/Hr) IV Continuous <Continuous>  dextrose 50% Injectable 25 Gram(s) IV Push once  dextrose 50% Injectable 12.5 Gram(s) IV Push once  dextrose 50% Injectable 25 Gram(s) IV Push once  dextrose Oral Gel 15 Gram(s) Oral once  fluticasone propionate/ salmeterol 250-50 MICROgram(s) Diskus 1 Dose(s) Inhalation two times a day  glucagon  Injectable 1 milliGRAM(s) IntraMuscular once  insulin lispro (ADMELOG) corrective regimen sliding scale   SubCutaneous at bedtime  insulin lispro (ADMELOG) corrective regimen sliding scale   SubCutaneous every 6 hours  metoprolol tartrate 25 milliGRAM(s) Oral two times a day  pantoprazole   Suspension 40 milliGRAM(s) Oral daily  polyethylene glycol 3350 17 Gram(s) Oral daily  thiamine 100 milliGRAM(s) Oral daily    MEDICATIONS  (PRN):  albuterol/ipratropium for Nebulization 3 milliLiter(s) Nebulizer every 6 hours PRN Shortness of Breath and/or Wheezing      Allergies    No Known Allergies    Intolerances        LABS:                        11.0   8.74  )-----------( 172      ( 19 Oct 2024 07:06 )             34.9     10-19    137  |  94[L]  |  14  ----------------------------<  150[H]  3.8   |  27  |  0.70    Ca    10.1      19 Oct 2024 07:06  Phos  3.7     10-19  Mg     1.9     10-19        Urinalysis Basic - ( 19 Oct 2024 07:06 )    Color: x / Appearance: x / SG: x / pH: x  Gluc: 150 mg/dL / Ketone: x  / Bili: x / Urobili: x   Blood: x / Protein: x / Nitrite: x   Leuk Esterase: x / RBC: x / WBC x   Sq Epi: x / Non Sq Epi: x / Bacteria: x        RADIOLOGY & ADDITIONAL TESTS:  Reviewed  ******************  Authored By: Henok Nava MD, PGY1  MS Teams Preferred  ******************   INTERVAL HPI/OVERNIGHT EVENTS:  Pt seen and examined at bedside. Mild bleeding overnight around the PEG tube site. Feeds were increased to 65 ml/h and patient is tolerating it well.     Brief Daily Plan:  - CTM monitor PEG tube  - Pending ROSALBA    VITAL SIGNS:  T(F): 98 (10-20-24 @ 04:58)  HR: 81 (10-20-24 @ 04:58)  BP: 114/76 (10-20-24 @ 04:58)  RR: 18 (10-20-24 @ 04:58)  SpO2: 98% (10-20-24 @ 04:58)  Wt(kg): --    PHYSICAL EXAM:    CONSTITUTIONAL: NAD, comfortable, AAOx3  HEENT: MMM  RESPIRATORY: Normal respiratory effort; lungs are clear to auscultation bilaterally  CARDIOVASCULAR: Regular rate and rhythm, normal S1 and S2, +systolic murmur  ABDOMEN: Nontender to palpation, normoactive bowel sounds, no rebound/guarding, PEG tube in place no erythema pus or tenderness but only trace dried blood seen  MUSCULOSKELETAL:  Moving limbs spontaneously; RUE in sling; No lower extremity edema  PSYCH: Affect appropriate, pleasant    MEDICATIONS  (STANDING):  aMIOdarone    Tablet 200 milliGRAM(s) Oral daily  amLODIPine   Tablet 10 milliGRAM(s) Oral daily  apixaban 5 milliGRAM(s) Enteral Tube every 12 hours  atorvastatin 40 milliGRAM(s) Oral at bedtime  dextrose 5%. 1000 milliLiter(s) (50 mL/Hr) IV Continuous <Continuous>  dextrose 5%. 1000 milliLiter(s) (100 mL/Hr) IV Continuous <Continuous>  dextrose 50% Injectable 25 Gram(s) IV Push once  dextrose 50% Injectable 12.5 Gram(s) IV Push once  dextrose 50% Injectable 25 Gram(s) IV Push once  dextrose Oral Gel 15 Gram(s) Oral once  fluticasone propionate/ salmeterol 250-50 MICROgram(s) Diskus 1 Dose(s) Inhalation two times a day  glucagon  Injectable 1 milliGRAM(s) IntraMuscular once  insulin lispro (ADMELOG) corrective regimen sliding scale   SubCutaneous at bedtime  insulin lispro (ADMELOG) corrective regimen sliding scale   SubCutaneous every 6 hours  metoprolol tartrate 25 milliGRAM(s) Oral two times a day  pantoprazole   Suspension 40 milliGRAM(s) Oral daily  polyethylene glycol 3350 17 Gram(s) Oral daily  thiamine 100 milliGRAM(s) Oral daily    MEDICATIONS  (PRN):  albuterol/ipratropium for Nebulization 3 milliLiter(s) Nebulizer every 6 hours PRN Shortness of Breath and/or Wheezing      Allergies    No Known Allergies    Intolerances        LABS:                        11.0   8.74  )-----------( 172      ( 19 Oct 2024 07:06 )             34.9     10-19    137  |  94[L]  |  14  ----------------------------<  150[H]  3.8   |  27  |  0.70    Ca    10.1      19 Oct 2024 07:06  Phos  3.7     10-19  Mg     1.9     10-19        Urinalysis Basic - ( 19 Oct 2024 07:06 )    Color: x / Appearance: x / SG: x / pH: x  Gluc: 150 mg/dL / Ketone: x  / Bili: x / Urobili: x   Blood: x / Protein: x / Nitrite: x   Leuk Esterase: x / RBC: x / WBC x   Sq Epi: x / Non Sq Epi: x / Bacteria: x        RADIOLOGY & ADDITIONAL TESTS:  Reviewed  ******************  Authored By: Henok Nava MD, PGY1  MS Teams Preferred  ******************

## 2024-10-20 NOTE — PROGRESS NOTE ADULT - ASSESSMENT
Mr. Jarrett Overton is a 69-year-old w/ PMH of HTN, T2DM, CVA, A. flutter, CAD s/p CABG x3 (~2018), and alcohol use disorder who initially presented to [facility] on 9/14 for a mechanical fall. On 10/4, patient was found to be hypoxic with subsequent CTA chest showing large PE of R main pulmonary artery. US of b/l LEs was further significant for b/l DVTs in the R peroneal vein and L popliteal, tibial trunk, peroneal, and posterior tibial veins. Patient was transferred to [facility] for further management of PE and tx of superimposed PNA, now s/p PEG placement, dispo ROSALBA.

## 2024-10-20 NOTE — PROGRESS NOTE ADULT - PROBLEM SELECTOR PLAN 1
- Hospital course at [facility] c/b AHRF  - CTA chest (10/4) -> Large PE of R main pulmonary artery  - US of b/l LEs -> R peroneal vein and L popliteal, tibial trunk, peroneal, and posterior tibial DVTs  - EKG showing evidence of right heart strain, S1Q3T3 changes, T wave depressions in precordial leads; not present on EKG from 9/15  - TTE (9/15) unremarkable  - Troponin 92, BNP 7168 on transfer  - s/p Eliquis loading now on Eliquis 5mg BID 10/17

## 2024-10-20 NOTE — PROGRESS NOTE ADULT - PROBLEM SELECTOR PLAN 2
# RESOLVED    Hospital course at [facility] c/b AHRF s/p intubation on 9/18 for likely benzodiazepine overdose, extubated on 9/19. Diffuse rhonchi with copious secretions on exam. CTA chest (10/4) -> Large PE of R main pulmonary artery, scattered GGOs and interstitial changes predominantly in R and L lower lobes. Likely PE +/- superimposed aspiration PNA s/p empiric Zosyn for 7d (10/5-10/12).    - C/w management of PE, as above  - Now on RA

## 2024-10-20 NOTE — PROGRESS NOTE ADULT - ATTENDING COMMENTS
69 year old male, with PMH of HTN, DM2, ?CVA/TIA, Aflutter, CAD s/p CABG (2018), EtOH use disorder (two 40oz beers daily), current smoker (8 cigs/day), L total hip arthroplasty, initially admitted to [facility] (9/14/24-10/4/24) after mech fall c/b R prox humeral fx and EtOH withdrawal. Was on CIWA with Librium/Ativan PRN. TTE on 9/15/24 showed LVEF 60-65%, grad I DD, and an aortic valve prosthesis with normal function. Course c/b AMS, acute hypoxemic/hypercapnic respiratory, and agonal breathing on 9/18/24. Was initially given flumazenil x2 with some improvement, but was intubated for airway protection given excessive secretions and transferred to MICU 9/18/24. CTA chest was somewhat limited study but did not find PE at the time, noted RUL GGOs "suspicious for atypical or viral infection." CTH showed subacute and chronic SDHs as well as chronic lacunar infarct within L lentiform nucleus. Repeat CT showed stable SDHs. Extubated on 9/19/24. Pt was downgraded to Medicine floors on 9/21/24. Noted to have loss of voice and dysphagia. Evaluated by S+S, found dysphagia on video flouroscopy to all textures and recommended for PEG tube, but pt refused and opted for NGT placement. Ortho surgery that was planned for humeral fx was cancelled and ultimately recommended for conservative management only. CXR on 10/3/24 showed new LLL infiltrate, started on Zosyn. On 10/4/24, pt desaturated on supplemental O2, CTA chest found massive PE in R main bronchus with pulm trunk dilatation. BLE duplex also found b/l LE DVTs (L>R). Started on hep drip and transferred to [facility] for embolectomy evaluation.    #Acute hypoxemic respiratory failure  #R main bronchus PE, b/l LE DVTs  #PNA  #Acute/subacute CVA  #Dysphagia  #R humeral fx  #Chronic Aflutter  #DM2  #EtOH/tobacco use disorder    - Now on room air  - Completed 7 day course of Zosyn   - c/w amiodarone and metoprolol (was on at OSH)  - c/w Advair BID and duonebs q6h PRN  - c/w low ISS for now, monitor FS  - Monitor on telemetry  - MRI head showing acute/subacute infarcts; neuro recs appreciated; f/u MG labs   - Continue statin, thiamine  - TTE with no acute findings   - Speech and swallow, NPO, FEES -> did not pass study   - transitioned to Eliquis 10mg BID for 7 days (including heparin drip days); was on heparin drip for planned PEG tube placement; now back to Eliquis 5mg BID  - Ortho - outpatient f/u   - started on NG feeds 10/5 but patient pulled out NGT 10/6 overnight; ENT replaced NGT on 10/7. s/p PEG placement 10/16; tolerated procedure well; tube feeds started; now at goal and tolerating dietitian f/u for further recs on tube feeds    - GOC: DNR/trial of intubation; MOLST form completed and placed in chart   - Stable for DC, CM f/u for dc to [facility]; sister gave choices for ROSAL[facility], will need auth    Rest as above. Discussed with HS.     Time-based billing (NON-critical care).     42 minutes spent on total encounter. The necessity of the time spent during the encounter on this date of service was due to:     - Reviewing, and interpreting labs, testing, and imaging.  - Independently obtaining a review of systems and performing a physical exam  - Reviewing prior records and where necessary, outpatient records.  - Counselling and educating patient regarding interpretation of aforementioned items and plan of care.  - Does not include time spent on resident education.

## 2024-10-20 NOTE — PROGRESS NOTE ADULT - PROBLEM SELECTOR PLAN 8
10/8 MRI brain: Small acute/subacute infarcts within the right cerebellar hemisphere and left posterior centrum semiovale with associated cytotoxic edema  -Neuro consulted  - Continue statin 40  - Target LDL <70, SBP <130  - Continue thiamine  -f/u neuro outpt

## 2024-10-20 NOTE — PROGRESS NOTE ADULT - PROBLEM SELECTOR PLAN 7
- Significant dysphagia s/p extubation on 9/19  - Patient declined PEG tube, NG tube placed instead, pt pulled out, now replaced  > Failed bedside S&S eval w/ laryngeal aspiration, rec'd NPO and FEES  > 10/8 MRI brain: Small acute/subacute infarcts within the right cerebellar   hemisphere and left posterior centrum semiovale with associated cytotoxic   edema.  - Per neuro, ordered myasthenia gravis labs, outpatient f/u   - s/p FEES 10/10, recommended to remain NPO  - GI consulted for PEG placement  - PEG placed 10/16  - Tolerating feeds - now at goal - Significant dysphagia s/p extubation on 9/19  - Patient declined PEG tube, NG tube placed instead, pt pulled out, now replaced  > Failed bedside S&S eval w/ laryngeal aspiration, rec'd NPO and FEES  > 10/8 MRI brain: Small acute/subacute infarcts within the right cerebellar   hemisphere and left posterior centrum semiovale with associated cytotoxic   edema.  - Per neuro, ordered myasthenia gravis labs, outpatient f/u   - s/p FEES 10/10, recommended to remain NPO  - GI consulted for PEG placement  - PEG placed 10/16  - Tolerating feeds - now at goal of 65ml/h

## 2024-10-20 NOTE — PROGRESS NOTE ADULT - PROBLEM SELECTOR PLAN 10
DVT Ppx: Eliquis 5mg bid   Diet: PEG tube feeds   Dispo: [facility] DVT Ppx: Eliquis 5mg bid   Diet: PEG tube feeds 65ml/h  Dispo: [facility]

## 2024-10-20 NOTE — PROGRESS NOTE ADULT - PROBLEM SELECTOR PLAN 4
- Initially presented to [facility] on 9/14 for a mechanical fall after he tripped and fell onto his R shoulder  - XR showing R proximal humerus fracture, ORIF with Orthopedic Surgery at [facility] cancelled due to MICU course  > Orthopedic surgery on 10/7 said can follow-up outpt orthopedic surgery.

## 2024-10-21 LAB
ANION GAP SERPL CALC-SCNC: 14 MMOL/L — SIGNIFICANT CHANGE UP (ref 5–17)
BASOPHILS # BLD AUTO: 0.08 K/UL — SIGNIFICANT CHANGE UP (ref 0–0.2)
BASOPHILS NFR BLD AUTO: 1 % — SIGNIFICANT CHANGE UP (ref 0–2)
BUN SERPL-MCNC: 15 MG/DL — SIGNIFICANT CHANGE UP (ref 7–23)
CALCIUM SERPL-MCNC: 10.1 MG/DL — SIGNIFICANT CHANGE UP (ref 8.4–10.5)
CHLORIDE SERPL-SCNC: 97 MMOL/L — SIGNIFICANT CHANGE UP (ref 96–108)
CO2 SERPL-SCNC: 27 MMOL/L — SIGNIFICANT CHANGE UP (ref 22–31)
CREAT SERPL-MCNC: 0.69 MG/DL — SIGNIFICANT CHANGE UP (ref 0.5–1.3)
EGFR: 101 ML/MIN/1.73M2 — SIGNIFICANT CHANGE UP
EOSINOPHIL # BLD AUTO: 0.25 K/UL — SIGNIFICANT CHANGE UP (ref 0–0.5)
EOSINOPHIL NFR BLD AUTO: 3.2 % — SIGNIFICANT CHANGE UP (ref 0–6)
GLUCOSE BLDC GLUCOMTR-MCNC: 131 MG/DL — HIGH (ref 70–99)
GLUCOSE BLDC GLUCOMTR-MCNC: 134 MG/DL — HIGH (ref 70–99)
GLUCOSE BLDC GLUCOMTR-MCNC: 135 MG/DL — HIGH (ref 70–99)
GLUCOSE SERPL-MCNC: 131 MG/DL — HIGH (ref 70–99)
HCT VFR BLD CALC: 33.7 % — LOW (ref 39–50)
HGB BLD-MCNC: 10.6 G/DL — LOW (ref 13–17)
IMM GRANULOCYTES NFR BLD AUTO: 0.4 % — SIGNIFICANT CHANGE UP (ref 0–0.9)
LYMPHOCYTES # BLD AUTO: 1.66 K/UL — SIGNIFICANT CHANGE UP (ref 1–3.3)
LYMPHOCYTES # BLD AUTO: 21 % — SIGNIFICANT CHANGE UP (ref 13–44)
MAGNESIUM SERPL-MCNC: 2 MG/DL — SIGNIFICANT CHANGE UP (ref 1.6–2.6)
MCHC RBC-ENTMCNC: 29.2 PG — SIGNIFICANT CHANGE UP (ref 27–34)
MCHC RBC-ENTMCNC: 31.5 GM/DL — LOW (ref 32–36)
MCV RBC AUTO: 92.8 FL — SIGNIFICANT CHANGE UP (ref 80–100)
MONOCYTES # BLD AUTO: 0.81 K/UL — SIGNIFICANT CHANGE UP (ref 0–0.9)
MONOCYTES NFR BLD AUTO: 10.3 % — SIGNIFICANT CHANGE UP (ref 2–14)
NEUTROPHILS # BLD AUTO: 5.06 K/UL — SIGNIFICANT CHANGE UP (ref 1.8–7.4)
NEUTROPHILS NFR BLD AUTO: 64.1 % — SIGNIFICANT CHANGE UP (ref 43–77)
NRBC # BLD: 0 /100 WBCS — SIGNIFICANT CHANGE UP (ref 0–0)
PHOSPHATE SERPL-MCNC: 4.3 MG/DL — SIGNIFICANT CHANGE UP (ref 2.5–4.5)
PLATELET # BLD AUTO: 200 K/UL — SIGNIFICANT CHANGE UP (ref 150–400)
POTASSIUM SERPL-MCNC: 4.1 MMOL/L — SIGNIFICANT CHANGE UP (ref 3.5–5.3)
POTASSIUM SERPL-SCNC: 4.1 MMOL/L — SIGNIFICANT CHANGE UP (ref 3.5–5.3)
RBC # BLD: 3.63 M/UL — LOW (ref 4.2–5.8)
RBC # FLD: 13.5 % — SIGNIFICANT CHANGE UP (ref 10.3–14.5)
SODIUM SERPL-SCNC: 138 MMOL/L — SIGNIFICANT CHANGE UP (ref 135–145)
WBC # BLD: 7.89 K/UL — SIGNIFICANT CHANGE UP (ref 3.8–10.5)
WBC # FLD AUTO: 7.89 K/UL — SIGNIFICANT CHANGE UP (ref 3.8–10.5)

## 2024-10-21 PROCEDURE — 99231 SBSQ HOSP IP/OBS SF/LOW 25: CPT

## 2024-10-21 PROCEDURE — 99232 SBSQ HOSP IP/OBS MODERATE 35: CPT | Mod: GC

## 2024-10-21 RX ADMIN — Medication 100 MILLIGRAM(S): at 11:42

## 2024-10-21 RX ADMIN — Medication 200 MILLIGRAM(S): at 06:12

## 2024-10-21 RX ADMIN — POLYETHYLENE GLYCOL 3350 17 GRAM(S): 17 POWDER, FOR SOLUTION ORAL at 11:42

## 2024-10-21 RX ADMIN — APIXABAN 5 MILLIGRAM(S): 5 TABLET, FILM COATED ORAL at 06:12

## 2024-10-21 RX ADMIN — Medication 25 MILLIGRAM(S): at 17:12

## 2024-10-21 RX ADMIN — Medication 25 MILLIGRAM(S): at 06:12

## 2024-10-21 RX ADMIN — PANTOPRAZOLE SODIUM 40 MILLIGRAM(S): 40 TABLET, DELAYED RELEASE ORAL at 11:41

## 2024-10-21 RX ADMIN — FLUTICASONE PROPIONATE AND SALMETEROL XINAFOATE 1 DOSE(S): 230; 21 AEROSOL, METERED RESPIRATORY (INHALATION) at 17:13

## 2024-10-21 RX ADMIN — Medication 40 MILLIGRAM(S): at 22:06

## 2024-10-21 RX ADMIN — APIXABAN 5 MILLIGRAM(S): 5 TABLET, FILM COATED ORAL at 17:13

## 2024-10-21 RX ADMIN — Medication 650 MILLIGRAM(S): at 22:12

## 2024-10-21 RX ADMIN — Medication 10 MILLIGRAM(S): at 06:12

## 2024-10-21 RX ADMIN — FLUTICASONE PROPIONATE AND SALMETEROL XINAFOATE 1 DOSE(S): 230; 21 AEROSOL, METERED RESPIRATORY (INHALATION) at 06:12

## 2024-10-21 RX ADMIN — Medication 650 MILLIGRAM(S): at 22:06

## 2024-10-21 NOTE — PROGRESS NOTE ADULT - PROBLEM SELECTOR PLAN 2
# RESOLVED    Hospital course at [facility] c/b AHRF s/p intubation on 9/18 for likely benzodiazepine overdose, extubated on 9/19. Diffuse rhonchi with copious secretions on exam. CTA chest (10/4) -> Large PE of R main pulmonary artery, scattered GGOs and interstitial changes predominantly in R and L lower lobes. Likely PE +/- superimposed aspiration PNA s/p empiric Zosyn for 7d (10/5-10/12).    - C/w management of PE, as above  - Now on RA

## 2024-10-21 NOTE — PROGRESS NOTE ADULT - ATTENDING COMMENTS
69 year old male, with PMH of HTN, DM2, ?CVA/TIA, Aflutter, CAD s/p CABG (2018), EtOH use disorder (two 40oz beers daily), current smoker (8 cigs/day), L total hip arthroplasty, initially admitted to [facility] (9/14/24-10/4/24) after mech fall c/b R prox humeral fx and EtOH withdrawal. Was on CIWA with Librium/Ativan PRN. TTE on 9/15/24 showed LVEF 60-65%, grad I DD, and an aortic valve prosthesis with normal function. Course c/b AMS, acute hypoxemic/hypercapnic respiratory, and agonal breathing on 9/18/24. Was initially given flumazenil x2 with some improvement, but was intubated for airway protection given excessive secretions and transferred to MICU 9/18/24. CTA chest was somewhat limited study but did not find PE at the time, noted RUL GGOs "suspicious for atypical or viral infection." CTH showed subacute and chronic SDHs as well as chronic lacunar infarct within L lentiform nucleus. Repeat CT showed stable SDHs. Extubated on 9/19/24. Pt was downgraded to Medicine floors on 9/21/24. Noted to have loss of voice and dysphagia. Evaluated by S+S, found dysphagia on video flouroscopy to all textures and recommended for PEG tube, but pt refused and opted for NGT placement. Ortho surgery that was planned for humeral fx was cancelled and ultimately recommended for conservative management only. CXR on 10/3/24 showed new LLL infiltrate, started on Zosyn. On 10/4/24, pt desaturated on supplemental O2, CTA chest found massive PE in R main bronchus with pulm trunk dilatation. BLE duplex also found b/l LE DVTs (L>R). Started on hep drip and transferred to [facility] for embolectomy evaluation.    #Acute hypoxemic respiratory failure  #R main bronchus PE, b/l LE DVTs  #PNA  #Acute/subacute CVA  #Dysphagia  #R humeral fx  #Chronic Aflutter  #DM2  #EtOH/tobacco use disorder    - Now on room air  - Completed 7 day course of Zosyn   - c/w amiodarone and metoprolol (was on at OSH)  - c/w Advair BID and duonebs q6h PRN  - c/w low ISS for now, monitor FS  - Monitor on telemetry  - MRI head showing acute/subacute infarcts; neuro recs appreciated; f/u MG labs   - Continue statin, thiamine  - TTE with no acute findings   - Speech and swallow, NPO, FEES -> did not pass study   - transitioned to Eliquis 10mg BID for 7 days (including heparin drip days); was on heparin drip for planned PEG tube placement; now back to Eliquis 5mg BID  - Ortho - outpatient f/u   - started on NG feeds 10/5 but patient pulled out NGT 10/6 overnight; ENT replaced NGT on 10/7. s/p PEG placement 10/16; tolerated procedure well; tube feeds started; now at goal and tolerating dietitian f/u for further recs on tube feeds    - GOC: DNR/trial of intubation; MOLST form completed and placed in chart   - Stable for DC, CM f/u for dc to [facility]; pending auth  Rest as above. Discussed with HS.     Time-based billing (NON-critical care).     40 minutes spent on total encounter. The necessity of the time spent during the encounter on this date of service was due to:     - Reviewing, and interpreting labs, testing, and imaging.  - Independently obtaining a review of systems and performing a physical exam  - Reviewing prior records and where necessary, outpatient records.  - Counselling and educating patient regarding interpretation of aforementioned items and plan of care.  - Does not include time spent on resident education.

## 2024-10-21 NOTE — PROGRESS NOTE ADULT - ASSESSMENT
Mr. Jarrett Overton is a 69-year-old w/ PMH of HTN, T2DM, CVA, A. flutter, CAD s/p CABG x3 (~2018), and alcohol use disorder who initially presented to [facility] on 9/14 for a mechanical fall. On 10/4, patient was found to be hypoxic with subsequent CTA chest showing large PE of R main pulmonary artery. US of b/l LEs was further significant for b/l DVTs in the R peroneal vein and L popliteal, tibial trunk, peroneal, and posterior tibial veins. Patient was transferred to [facility] for further management of PE and tx of superimposed PNA, now s/p PEG placement, dispo [facility].

## 2024-10-21 NOTE — PROGRESS NOTE ADULT - PROBLEM SELECTOR PLAN 1
- Hospital course at [facility] c/b AHRF  - CTA chest (10/4) -> Large PE of R main pulmonary artery  - US of b/l LEs -> R peroneal vein and L popliteal, tibial trunk, peroneal, and posterior tibial DVTs  - EKG showing evidence of right heart strain, S1Q3T3 changes, T wave depressions in precordial leads; not present on EKG from 9/15  - TTE (9/15) unremarkable  - Troponin 92, BNP 7168 on transfer  - s/p Eliquis loading now on Eliquis 5mg BID 10/17

## 2024-10-21 NOTE — PROGRESS NOTE ADULT - PROBLEM SELECTOR PLAN 3
- US of b/l LEs at [facility] -> R peroneal vein and L popliteal, tibial trunk, peroneal, and posterior tibial DVTs  > Repeat US of b/l LEs showing mulitple DVTs  > off heparin, now on Eliquis 5mg BID

## 2024-10-21 NOTE — PROGRESS NOTE ADULT - SUBJECTIVE AND OBJECTIVE BOX
Wound Surgery Consult Note:    HPI:  Mr. Jarrett Overton is a 69-year-old w/ PMH of HTN, T2DM, CVA, A. flutter, CAD s/p CABG x3 (~2018), and alcohol use disorder who initially presented to [facility] on 9/14 for a mechanical fall after he tripped and fell onto his R shoulder, denied any headstrike or LOC, subsequent XR showing R proximal humerus fracture with planned ORIF with Orthopedic Surgery. Course was c/b alcohol withdrawal and was started on CIWA protocol. On 9/18, RRT was called for AMS and patient was found to be in acute hypercapnic respiratory failure iso prior Librium dose. Patient was given flumazenil and then intubated and transferred to the MICU. ORIF of R humerus was cancelled and CT head showed signs of subacute and chronic subdural hematomas. Patient was extubated on 9/19 and repeat CT head showed stable hematomas. Following extubation, patient suffered from significant dysphagia but patient declined PEG tube offered by GI and instead NG tube was placed. On 10/4, patient was found to be hypoxic with subsequent CTA chest showing large PE of R main pulmonary artery. US of b/l LEs was further significant for b/l DVTs in the R peroneal vein and L popliteal, tibial trunk, peroneal, and posterior tibial veins. Patient was transferred to [facility] for further management of PE and embolectomy evaluation. (05 Oct 2024 01:45)    Follow u visit for wound care reevaluation of the Right elbow performed today. Mr. Overton was encountered on an alternating air with low air loss surface. His right arm was in a sling. He stated that when he fell on 9.14, prior to admission, that he landed on his right shoulder, elbow and hip. He stated that he has no pain/discomfort associated with the right elbow wound. His immobility and inactivity in addition to poor nutritional status all contribute to his risk of pressure injury development and hinder healing. Principles of pressure injury prevention including but not limited to turning and positioning reviewed with patient.     PAST MEDICAL & SURGICAL HISTORY:  HTN (hypertension)  T2DM (type 2 diabetes mellitus)  CVA (cerebrovascular accident)  Atrial flutter  CAD (coronary artery disease)  History of alcohol use disorder  History of repair of left hip joint    REVIEW OF SYSTEMS  CONSTITUTIONAL: + weakness, no fevers or chills  EYES/ENT: No visual changes;  No vertigo or throat pain   MOUTH: No oral lesion, moist  NECK: No pain or stiffness  RESPIRATORY: No cough, wheezing, hemoptysis; No shortness of breath  CARDIOVASCULAR: No chest pain or palpitations  GASTROINTESTINAL: No abdominal or epigastric pain. No nausea, vomiting, or hematemesis; No diarrhea or constipation. No melena or hematochezia.  GENITOURINARY: No dysuria, frequency or hematuria  NEUROLOGICAL: + weakness  SKIN: No itching, rashes  PSYCH: no confusion or altered mental status    MEDICATIONS  (STANDING):  aMIOdarone    Tablet 200 milliGRAM(s) Oral daily  amLODIPine   Tablet 10 milliGRAM(s) Oral daily  apixaban 5 milliGRAM(s) Enteral Tube every 12 hours  atorvastatin 40 milliGRAM(s) Oral at bedtime  dextrose 5%. 1000 milliLiter(s) (100 mL/Hr) IV Continuous <Continuous>  dextrose 5%. 1000 milliLiter(s) (50 mL/Hr) IV Continuous <Continuous>  dextrose 50% Injectable 25 Gram(s) IV Push once  dextrose 50% Injectable 12.5 Gram(s) IV Push once  dextrose 50% Injectable 25 Gram(s) IV Push once  dextrose Oral Gel 15 Gram(s) Oral once  fluticasone propionate/ salmeterol 250-50 MICROgram(s) Diskus 1 Dose(s) Inhalation two times a day  glucagon  Injectable 1 milliGRAM(s) IntraMuscular once  insulin lispro (ADMELOG) corrective regimen sliding scale   SubCutaneous every 6 hours  metoprolol tartrate 25 milliGRAM(s) Oral two times a day  pantoprazole   Suspension 40 milliGRAM(s) Oral daily  polyethylene glycol 3350 17 Gram(s) Oral daily  thiamine 100 milliGRAM(s) Oral daily    MEDICATIONS  (PRN):  acetaminophen     Tablet .. 650 milliGRAM(s) Oral every 6 hours PRN Mild Pain (1 - 3), Moderate Pain (4 - 6)  albuterol/ipratropium for Nebulization 3 milliLiter(s) Nebulizer every 6 hours PRN Shortness of Breath and/or Wheezing    Allergies    No Known Allergies    Intolerances    Vital Signs Last 24 Hrs  T(C): 36.8 (21 Oct 2024 10:38), Max: 37.1 (20 Oct 2024 20:41)  T(F): 98.3 (21 Oct 2024 10:38), Max: 98.7 (20 Oct 2024 20:41)  HR: 71 (21 Oct 2024 10:38) (71 - 74)  BP: 117/78 (21 Oct 2024 10:38) (110/75 - 119/72)  BP(mean): --  RR: 18 (21 Oct 2024 10:38) (18 - 18)  SpO2: 96% (21 Oct 2024 10:38) (96% - 98%)    Parameters below as of 21 Oct 2024 10:38  Patient On (Oxygen Delivery Method): room air    Physical Exam:  General: alert, WN  Ophthamology: sclera clear  ENMT: moist mucous membranes, trachea midline  Respiratory: equal chest rise with respirations  Gastrointestinal: soft NT/ND  Neurology: verbal,  following commands  Psych: calm, cooperative  Musculoskeletal: no contractures  Vascular: BLE edema equal  Skin:  Right elbow wound 90% covered with adherent, thin, moist, tan thin layer of slough in the center with perimeter pink wound bed,  L 3.5cm x 3.5cm x unknown, small amount of drainage   No odor, increased warmth, tenderness, induration, fluctuance, erythema      LABS:  10-21    138  |  97  |  15  ----------------------------<  131[H]  4.1   |  27  |  0.69    Ca    10.1      21 Oct 2024 06:35  Phos  4.3     10-21  Mg     2.0     10-21                            10.6   7.89  )-----------( 200      ( 21 Oct 2024 06:35 )             33.7       Urinalysis Basic - ( 21 Oct 2024 06:35 )    Color: x / Appearance: x / SG: x / pH: x  Gluc: 131 mg/dL / Ketone: x  / Bili: x / Urobili: x   Blood: x / Protein: x / Nitrite: x   Leuk Esterase: x / RBC: x / WBC x   Sq Epi: x / Non Sq Epi: x / Bacteria: x

## 2024-10-21 NOTE — PROGRESS NOTE ADULT - ASSESSMENT
69-year-old PMH of HTN, T2DM, ?stroke, atrial flutter, CAD s/p CABG x3 (~2018), and alcohol use disorder who initially presented to [facility] on 9/14 for a mechanical fall after he tripped and fell onto his R shoulder. He was diagnosed with R proximal humerus fracture with planned ORIF with Orthopedic Surgery. Patient's course was c/b alcohol w/d and he was started on CIWA protocol with chlordiazepoxide. Patient experienced acute hypercapnic RF - thought to be d/t benzodiazepine use. Was given flumazenil and was intubated. Transferred to MICU. His ORIF was cancelled d/t CT head with evidence of subacute to chronic SDHs. Extubated 9/19 - with ongoing dysphagia since. He refused PEG placement - NGT in place. On 10/4 - he was found to be hypoxic with CTA chest showing large PE of the R main PA. US of the LEs disclosed multiple b/l DVTs. Transferred to [facility] for further management of this PE. Given history of subacute vs. chronic subdural hematomas, patient underwent CTH on 10/7/2024 - exam degraded by motion at the vertex. Primary team d/w NSGY - they were OK with initiating heparin gtt. Patient now transitioned to apixaban 10mg BID for VTE. Underwent MRI brain on 10/8/2024, given ongoing dysphagia since extubation on 9/19/2024. Punctate infarcts seen in the right cerebellum and left centrum semiovale. Additionally, patient underwent swallow bedside assessment on 10/9 - he has prolonged bolus manipulation, prolonged bolus transport, and there is suspicion for laryngeal penetration/aspiration. Due for FEES. Vascular Neurology is consulted for MRI findings.    Recent vitals notable for: afebrile, HR 70s-80s, BP to 149/80, RR 18, SpO2 94-98% on 4L O2 via NC. Labs notable for: Hg 9.6, potassium 3.4, glucose 136, A1c 6.1. MRI brain w/wo with punctate acute/subacute infarcts in the R cerebellum and left centrum semiovale, possible chronic infarct in the left basal ganglia, and white matter hyperintensities of presumed vascular origin. Exam notable for chronically ill-appearing man with NGT in place, RUE immobilized in sling, there is no difficulty with prolonged upgaze, no eyelid ptosis, facial strength intact without asymmetry b/l - no eyelid closure weakness, no air escape when puffed cheeks are pushed in, +hypophonia, no drift in his extremities (other than RUE which has a proximal humeral fracture).  NIHSS: 4 (+1 questions, +3 RUE)  LKN: Unknown  pre-MRS: estimated at least 1  MRI brain w/wo with punctate acute/subacute infarcts in the R cerebellum and left centrum semiovale, possible chronic infarct in the left basal ganglia, and white matter hyperintensities  A1c 6.1 (10/5/2024)  TTE (10/5/2024): LVEF 60-65%, no RWMA, normal left atrium, intact interatrial septum    s/p PEG 10/16     Impression:   1) Incidental infarcts in the right cerebellum and left centrum semiovale. Mechanism cardioembolic d/t atrial flutter.  2) Isolated dysphagia/dysphonia - MRI brain w/wo unrevealing with regard to etiology. Exam is not c/w myasthenia gravis.  3) mechanical fall   4) h/o SDH   5) ETOH abuse     Recommendations:  - s/p PEG on tube feeds   - SBP goal - OK for normotension from neurovascular perspective (BP goal <130/80)  - Continue apixaban for lifelong anticoagulation for atrial flutter - currently on loading dose 10mg BID for VTE, he can continue 5mg BID for secondary stroke prevention when dose is reduced  - Regarding initiation of statin -  would start atorvastatin 40mg QHS if no objection, target LDL <70 for secondary stroke prevention  - CT angiogram of the head/neck w/IV contrast can be performed on a nonurgent basis to complete stroke workup but unlikely to change stroke management in this patient  - Vitals ordered q4h per primary, likely does not need routine neurochecks at this time (from neurovascular perspective) given infarcts are asymptomatic  - Fall and aspiration precautions  - PT/OT  - DVT prophylaxis: therapeutic AC  - Stroke education provided  - Smoking cessation education provided  - Please document MRS on discharge  - for ETOH h/o would c/w thimiamine.  folic acid   - Regarding dysphagia/dysphonia:  Needs ongoing multidisciplinary workup. F/u SLP recommendations. Consider ENT and GI evaluations; from neuro standpoint no etiology for dysphagia.  Can obtain myasthenia gravis labs although very low clinical suspicion: acetylcholine blocking antibody, acetylcholine modulating antibody, acetylcholine receptor binding antibody, LRP4 autoantibody test, MuSK antibody test  - If no other explanation is found for symptoms: follow up outpatient with Neurology for further evaluation including possible EMG  - Upon discharge, he should follow up with Dr. Akbar Elena (Vascular Neurology): [address], [place] [address]. [phone].  dc plan [facility]   Akbar Elena MD  Vascular Neurology  Office: [phone] .

## 2024-10-21 NOTE — PROGRESS NOTE ADULT - PROBLEM SELECTOR PLAN 7
- Significant dysphagia s/p extubation on 9/19  - Patient declined PEG tube, NG tube placed instead, pt pulled out, now replaced  > Failed bedside S&S eval w/ laryngeal aspiration, rec'd NPO and FEES  > 10/8 MRI brain: Small acute/subacute infarcts within the right cerebellar   hemisphere and left posterior centrum semiovale with associated cytotoxic   edema.  - Per neuro, ordered myasthenia gravis labs, outpatient f/u   - s/p FEES 10/10, recommended to remain NPO  - GI consulted for PEG placement  - PEG placed 10/16  - Tolerating feeds - now at goal of 65ml/h

## 2024-10-21 NOTE — PROGRESS NOTE ADULT - SUBJECTIVE AND OBJECTIVE BOX
INTERVAL HPI/OVERNIGHT EVENTS:  Pt seen and examined at bedside. No acute overnight events or complaints.    Brief Daily Plan:  -    VITAL SIGNS:  T(F): 98 (10-21-24 @ 05:14)  HR: 74 (10-21-24 @ 05:14)  BP: 119/72 (10-21-24 @ 05:14)  RR: 18 (10-21-24 @ 05:14)  SpO2: 97% (10-21-24 @ 05:14)  Wt(kg): --    PHYSICAL EXAM:    CONSTITUTIONAL: NAD, comfortable, AAOx3  HEENT: MMM  RESPIRATORY: Normal respiratory effort; lungs are clear to auscultation bilaterally  CARDIOVASCULAR: Regular rate and rhythm, normal S1 and S2, +systolic murmur  ABDOMEN: Nontender to palpation, normoactive bowel sounds, no rebound/guarding, PEG tube in place no erythema pus or tenderness but only trace dried blood seen  MUSCULOSKELETAL:  Moving limbs spontaneously; RUE in sling; No lower extremity edema  PSYCH: Affect appropriate, pleasant    MEDICATIONS  (STANDING):  aMIOdarone    Tablet 200 milliGRAM(s) Oral daily  amLODIPine   Tablet 10 milliGRAM(s) Oral daily  apixaban 5 milliGRAM(s) Enteral Tube every 12 hours  atorvastatin 40 milliGRAM(s) Oral at bedtime  dextrose 5%. 1000 milliLiter(s) (50 mL/Hr) IV Continuous <Continuous>  dextrose 5%. 1000 milliLiter(s) (100 mL/Hr) IV Continuous <Continuous>  dextrose 50% Injectable 12.5 Gram(s) IV Push once  dextrose 50% Injectable 25 Gram(s) IV Push once  dextrose 50% Injectable 25 Gram(s) IV Push once  dextrose Oral Gel 15 Gram(s) Oral once  fluticasone propionate/ salmeterol 250-50 MICROgram(s) Diskus 1 Dose(s) Inhalation two times a day  glucagon  Injectable 1 milliGRAM(s) IntraMuscular once  insulin lispro (ADMELOG) corrective regimen sliding scale   SubCutaneous every 6 hours  metoprolol tartrate 25 milliGRAM(s) Oral two times a day  pantoprazole   Suspension 40 milliGRAM(s) Oral daily  polyethylene glycol 3350 17 Gram(s) Oral daily  thiamine 100 milliGRAM(s) Oral daily    MEDICATIONS  (PRN):  acetaminophen     Tablet .. 650 milliGRAM(s) Oral every 6 hours PRN Mild Pain (1 - 3), Moderate Pain (4 - 6)  albuterol/ipratropium for Nebulization 3 milliLiter(s) Nebulizer every 6 hours PRN Shortness of Breath and/or Wheezing      Allergies    No Known Allergies    Intolerances        LABS:                        10.5   7.75  )-----------( 186      ( 20 Oct 2024 06:40 )             32.8     10-21    138  |  97  |  15  ----------------------------<  131[H]  4.1   |  27  |  0.69    Ca    10.1      21 Oct 2024 06:35  Phos  4.3     10-21  Mg     2.0     10-21        Urinalysis Basic - ( 21 Oct 2024 06:35 )    Color: x / Appearance: x / SG: x / pH: x  Gluc: 131 mg/dL / Ketone: x  / Bili: x / Urobili: x   Blood: x / Protein: x / Nitrite: x   Leuk Esterase: x / RBC: x / WBC x   Sq Epi: x / Non Sq Epi: x / Bacteria: x        RADIOLOGY & ADDITIONAL TESTS:  Reviewed  ******************  Authored By: Henok Nava MD, PGY1  MS Teams Preferred  ******************   INTERVAL HPI/OVERNIGHT EVENTS:  Pt seen and examined at bedside. No acute overnight events or complaints. No pain near the peg site.     Brief Daily Plan:  - pending disposition to [facility]    VITAL SIGNS:  T(F): 98 (10-21-24 @ 05:14)  HR: 74 (10-21-24 @ 05:14)  BP: 119/72 (10-21-24 @ 05:14)  RR: 18 (10-21-24 @ 05:14)  SpO2: 97% (10-21-24 @ 05:14)  Wt(kg): --    PHYSICAL EXAM:    CONSTITUTIONAL: NAD, comfortable, AAOx3  HEENT: MMM  RESPIRATORY: Normal respiratory effort; lungs are clear to auscultation bilaterally  CARDIOVASCULAR: Regular rate and rhythm, normal S1 and S2, +systolic murmur  ABDOMEN: Nontender to palpation, normoactive bowel sounds, no rebound/guarding, PEG tube in place no erythema pus or tenderness but only trace dried blood seen  MUSCULOSKELETAL:  Moving limbs spontaneously; RUE in sling; No lower extremity edema  PSYCH: Affect appropriate, pleasant    MEDICATIONS  (STANDING):  aMIOdarone    Tablet 200 milliGRAM(s) Oral daily  amLODIPine   Tablet 10 milliGRAM(s) Oral daily  apixaban 5 milliGRAM(s) Enteral Tube every 12 hours  atorvastatin 40 milliGRAM(s) Oral at bedtime  dextrose 5%. 1000 milliLiter(s) (50 mL/Hr) IV Continuous <Continuous>  dextrose 5%. 1000 milliLiter(s) (100 mL/Hr) IV Continuous <Continuous>  dextrose 50% Injectable 12.5 Gram(s) IV Push once  dextrose 50% Injectable 25 Gram(s) IV Push once  dextrose 50% Injectable 25 Gram(s) IV Push once  dextrose Oral Gel 15 Gram(s) Oral once  fluticasone propionate/ salmeterol 250-50 MICROgram(s) Diskus 1 Dose(s) Inhalation two times a day  glucagon  Injectable 1 milliGRAM(s) IntraMuscular once  insulin lispro (ADMELOG) corrective regimen sliding scale   SubCutaneous every 6 hours  metoprolol tartrate 25 milliGRAM(s) Oral two times a day  pantoprazole   Suspension 40 milliGRAM(s) Oral daily  polyethylene glycol 3350 17 Gram(s) Oral daily  thiamine 100 milliGRAM(s) Oral daily    MEDICATIONS  (PRN):  acetaminophen     Tablet .. 650 milliGRAM(s) Oral every 6 hours PRN Mild Pain (1 - 3), Moderate Pain (4 - 6)  albuterol/ipratropium for Nebulization 3 milliLiter(s) Nebulizer every 6 hours PRN Shortness of Breath and/or Wheezing      Allergies    No Known Allergies    Intolerances        LABS:                        10.5   7.75  )-----------( 186      ( 20 Oct 2024 06:40 )             32.8     10-21    138  |  97  |  15  ----------------------------<  131[H]  4.1   |  27  |  0.69    Ca    10.1      21 Oct 2024 06:35  Phos  4.3     10-21  Mg     2.0     10-21        Urinalysis Basic - ( 21 Oct 2024 06:35 )    Color: x / Appearance: x / SG: x / pH: x  Gluc: 131 mg/dL / Ketone: x  / Bili: x / Urobili: x   Blood: x / Protein: x / Nitrite: x   Leuk Esterase: x / RBC: x / WBC x   Sq Epi: x / Non Sq Epi: x / Bacteria: x        RADIOLOGY & ADDITIONAL TESTS:  Reviewed  ******************  Authored By: Henok Nava MD, PGY1  MS Teams Preferred  ******************

## 2024-10-21 NOTE — PROGRESS NOTE ADULT - PROBLEM SELECTOR PLAN 4
- Initially presented to [facility] on 9/14 for a mechanical fall after he tripped and fell onto his R shoulder  - XR showing R proximal humerus fracture, ORIF with Orthopedic Surgery at [facility] cancelled due to MICU course  > Orthopedic surgery on 10/7 said can follow-up outpt orthopedic surgery.

## 2024-10-21 NOTE — PROGRESS NOTE ADULT - ASSESSMENT
Right elbow wound pressure injury vs. traumatic wound  Pressure Injury Prophylaxis    Recommend:  1.) topical therapy: Right elbow wound - cleanse with VASHE cleanser, pat dry, apply medihoney paste, cover with allevyn foam dressing daily  2.) Incontinence Management - incontinence cleanser, pads, pericare BID  3.) Maintain on an alternating air with low air loss surface  4.) Turn and reposition Q 2 hours  5.) Nutrition optimization - please add Atlat  6.) Offload heels/feet with complete cair air fluidized boots/pillows; ensure that the soles of the feet are not resting on the foot board of the bed.  7.) chair cushion for chair sitting  8.) glycemic control  9.) Keep skin under/in contact with medical devices clean and dry  10.) Monitor skin under/in contact with medical devices for skin breakdown  11.) Discontinue medical devices as soon as no longer indicated  12.) Utilize medical devices per  guidelines  13.) Protect skin under medical devices with foam dressings for first sign of irritation  14.) Adjust medical devices as needed to ensure proper fit      Care as per medicine. Will not actively follow but will remain available. Please recall for new issues or deterioration.  Upon discharge f/u as outpatient at [facility] [address] [facility] [phone]  Thank you for this consult  Jenny Clay, DORIE-C, CWOCN via TEAMS

## 2024-10-21 NOTE — PROGRESS NOTE ADULT - SUBJECTIVE AND OBJECTIVE BOX
Neurology Progress Note    S: Patient seen and examined. No new events overnight.     Medication:      Medications: MEDICATIONS  (STANDING):  aMIOdarone    Tablet 200 milliGRAM(s) Oral daily  amLODIPine   Tablet 10 milliGRAM(s) Oral daily  apixaban 5 milliGRAM(s) Enteral Tube every 12 hours  atorvastatin 40 milliGRAM(s) Oral at bedtime  dextrose 5%. 1000 milliLiter(s) (100 mL/Hr) IV Continuous <Continuous>  dextrose 5%. 1000 milliLiter(s) (50 mL/Hr) IV Continuous <Continuous>  dextrose 50% Injectable 12.5 Gram(s) IV Push once  dextrose 50% Injectable 25 Gram(s) IV Push once  dextrose 50% Injectable 25 Gram(s) IV Push once  dextrose Oral Gel 15 Gram(s) Oral once  fluticasone propionate/ salmeterol 250-50 MICROgram(s) Diskus 1 Dose(s) Inhalation two times a day  glucagon  Injectable 1 milliGRAM(s) IntraMuscular once  insulin lispro (ADMELOG) corrective regimen sliding scale   SubCutaneous every 6 hours  metoprolol tartrate 25 milliGRAM(s) Oral two times a day  pantoprazole   Suspension 40 milliGRAM(s) Oral daily  polyethylene glycol 3350 17 Gram(s) Oral daily  thiamine 100 milliGRAM(s) Oral daily    MEDICATIONS  (PRN):  acetaminophen     Tablet .. 650 milliGRAM(s) Oral every 6 hours PRN Mild Pain (1 - 3), Moderate Pain (4 - 6)  albuterol/ipratropium for Nebulization 3 milliLiter(s) Nebulizer every 6 hours PRN Shortness of Breath and/or Wheezing       Vitals:  Vital Signs Last 24 Hrs  T(C): 36.8 (21 Oct 2024 10:38), Max: 37.1 (20 Oct 2024 20:41)  T(F): 98.3 (21 Oct 2024 10:38), Max: 98.7 (20 Oct 2024 20:41)  HR: 71 (21 Oct 2024 10:38) (71 - 74)  BP: 117/78 (21 Oct 2024 10:38) (110/75 - 119/72)  BP(mean): --  RR: 18 (21 Oct 2024 10:38) (18 - 18)  SpO2: 96% (21 Oct 2024 10:38) (96% - 98%)    Parameters below as of 21 Oct 2024 10:38  Patient On (Oxygen Delivery Method): room air               General Exam:   General Appearance: Appropriately dressed and in no acute distress       Head: Normocephalic, atraumatic and no dysmorphic features  Ear, Nose, and Throat: Moist mucous membranes +NGT   CVS: S1S2+  Resp: No SOB, no wheeze or rhonchi  Abd: soft NTND  Extremities: No edema, no cyanosis  Skin: No bruises, no rashes     Neurological Exam:    - Awake, Alert  - Oriented to: person/age. Knows he is in the hospital, but not which. Does not know month/year--> better AAOx2 now   - Speech: fluent  - Repetition and naming: intact   - Follows simple commands. Performs cross command incorrectly - does not cross.  - Fund of knowledge: knows current president    Cranial nerves - PERRL, BTT b/l, EOMI - there is no difficulty with prolonged upgaze, no eyelid ptosis, face sensation (V1-V3) intact b/l, facial strength intact without asymmetry b/l - no eyelid closure weakness, no air escape when puffed cheeks are pushed in, hearing grossly intact, palate with symmetric elevation, shoulder shrug likely slightly reduced R side d/t orthopedic limitation, tongue midline on protrusion with full lateral movement  Dysarthria: not clearly dysarthric but extremely hypophonic    Motor -  No drift in the LUE x at least 10 seconds  He can move his RUE distally, but cannot lift it AG - orthopedic limitation d/t R proximal humerus fracture  LLE/RLE - no drift x at least 5 seconds b/l    Sensation - Light touch intact throughout    No ankle clonus    Coordination - FNF intact LUE (cannot perform RUE d/t orthopedic limitation), HTS intact RLE, cannot perform LLE but suspect d/t orthopedic limitation - toe to finger intact b/l.    Gait and station - Unable to assess d/t fall risk/safety concerns.      I personally reviewed the below data/images/labs:        LABS:                          10.6   7.89  )-----------( 200      ( 21 Oct 2024 06:35 )             33.7     10-21    138  |  97  |  15  ----------------------------<  131[H]  4.1   |  27  |  0.69    Ca    10.1      21 Oct 2024 06:35  Phos  4.3     10-21  Mg     2.0     10-21          Urinalysis Basic - ( 21 Oct 2024 06:35 )    Color: x / Appearance: x / SG: x / pH: x  Gluc: 131 mg/dL / Ketone: x  / Bili: x / Urobili: x   Blood: x / Protein: x / Nitrite: x   Leuk Esterase: x / RBC: x / WBC x   Sq Epi: x / Non Sq Epi: x / Bacteria: x            :  MR BRAIN WAW IC    PROCEDURE DATE:  10/08/2024      INTERPRETATION:  .    CLINICAL INFORMATION: Dysphagia. Subacute on chronic subdural hematomas   on outside head CT.    TECHNIQUE: Multiplanar multisequential MRI of the brain was acquired with   and without the administration of IV gadolinium. 7.5 cc's of IV Gadavist   was administered for the purposes of this examination. 0 cc were   discarded.    COMPARISON: Prior CT study of the head from 10/7/2024. Outside CT study   of the head dated 9/19/2024.    FINDINGS: There is a small focus of diffusion restriction within the   right cerebellar hemisphere (series 5, image 38). An additional small   focus of diffusion reduction is seen within the left posterior centrum   semiovale (series 5, image 54). Corresponding areas of T2/FLAIR   hyperintense signal change are seen in these areas.    A small area of encephalomalacia and gliosis is seen within the left   basal ganglia.    Multiple additional patchy confluent nonspecific T2/FLAIR hyperintense   signal changes are noted throughout the bihemispheric white matter   without associated mass effect or restricted diffusion.    There is no abnormal brain parenchymal or leptomeningeal enhancement.    No hydrocephalus is seen. There are no abnormal extra-axial fluid   collections. Flow-voids are noted throughout the major intracranial   vessels, on the T2 weighted images, consistent with their patency. The   sella turcica and posterior fossa are unremarkable.    Mild scattered mucosal thickening is seen throughout the paranasal   sinuses. The bilateral tympanomastoid cavities are clear. The calvarium   appears intact. The orbits appear unremarkable apart from a disconjugate   gaze.    IMPRESSION: Small acute/subacute infarcts within the right cerebellar   hemisphere and left posterior centrum semiovale with associated cytotoxic   edema.    No acute intracranial hemorrhage.    Multiple patchy confluent nonspecific abnormal white matter foci of   T2/FLAIR prolongation statistically favoring microvascular type changes.    Additional small area of encephalomalacia and gliosis within the left   basal ganglia which may related to chronic infarction and/or hemorrhage.

## 2024-10-22 LAB
GLUCOSE BLDC GLUCOMTR-MCNC: 121 MG/DL — HIGH (ref 70–99)
GLUCOSE BLDC GLUCOMTR-MCNC: 125 MG/DL — HIGH (ref 70–99)
GLUCOSE BLDC GLUCOMTR-MCNC: 127 MG/DL — HIGH (ref 70–99)

## 2024-10-22 PROCEDURE — 99232 SBSQ HOSP IP/OBS MODERATE 35: CPT | Mod: GC

## 2024-10-22 RX ADMIN — FLUTICASONE PROPIONATE AND SALMETEROL XINAFOATE 1 DOSE(S): 230; 21 AEROSOL, METERED RESPIRATORY (INHALATION) at 17:36

## 2024-10-22 RX ADMIN — Medication 40 MILLIGRAM(S): at 22:19

## 2024-10-22 RX ADMIN — POLYETHYLENE GLYCOL 3350 17 GRAM(S): 17 POWDER, FOR SOLUTION ORAL at 11:19

## 2024-10-22 RX ADMIN — PANTOPRAZOLE SODIUM 40 MILLIGRAM(S): 40 TABLET, DELAYED RELEASE ORAL at 11:20

## 2024-10-22 RX ADMIN — APIXABAN 5 MILLIGRAM(S): 5 TABLET, FILM COATED ORAL at 05:25

## 2024-10-22 RX ADMIN — Medication 25 MILLIGRAM(S): at 05:25

## 2024-10-22 RX ADMIN — Medication 100 MILLIGRAM(S): at 11:20

## 2024-10-22 RX ADMIN — Medication 200 MILLIGRAM(S): at 05:25

## 2024-10-22 RX ADMIN — Medication 10 MILLIGRAM(S): at 05:25

## 2024-10-22 RX ADMIN — Medication 25 MILLIGRAM(S): at 17:37

## 2024-10-22 RX ADMIN — FLUTICASONE PROPIONATE AND SALMETEROL XINAFOATE 1 DOSE(S): 230; 21 AEROSOL, METERED RESPIRATORY (INHALATION) at 05:25

## 2024-10-22 RX ADMIN — APIXABAN 5 MILLIGRAM(S): 5 TABLET, FILM COATED ORAL at 17:37

## 2024-10-22 NOTE — PROGRESS NOTE ADULT - PROBLEM SELECTOR PLAN 1
- Hospital course at [facility] c/b AHRF  - CTA chest (10/4) -> Large PE of R main pulmonary artery  - US of b/l LEs -> R peroneal vein and L popliteal, tibial trunk, peroneal, and posterior tibial DVTs  - EKG showing evidence of right heart strain, S1Q3T3 changes, T wave depressions in precordial leads; not present on EKG from 9/15  - TTE (9/15) unremarkable  - Troponin 92, BNP 7168 on transfer  - s/p Eliquis loading now on Eliquis 5mg BID 10/17

## 2024-10-22 NOTE — PROGRESS NOTE ADULT - ASSESSMENT
Mr. Jarrett Overton is a 69-year-old w/ PMH of HTN, T2DM, CVA, A. flutter, CAD s/p CABG x3 (~2018), and alcohol use disorder who initially presented to [facility] on 9/14 for a mechanical fall. On 10/4, patient was found to be hypoxic with subsequent CTA chest showing large PE of R main pulmonary artery. US of b/l LEs was further significant for b/l DVTs in the R peroneal vein and L popliteal, tibial trunk, peroneal, and posterior tibial veins. Patient was transferred to [facility] for further management of PE and tx of superimposed PNA, now s/p PEG placement, dispo ROSALBA.

## 2024-10-22 NOTE — PROGRESS NOTE ADULT - PROBLEM SELECTOR PLAN 4
- Initially presented to [facility] on 9/14 for a mechanical fall after he tripped and fell onto his R shoulder  - XR showing R proximal humerus fracture, ORIF with Orthopedic Surgery at [facility] cancelled due to MICU course  > Orthopedic surgery on 10/7 said can follow-up outpt orthopedic surgery.

## 2024-10-22 NOTE — PROGRESS NOTE ADULT - SUBJECTIVE AND OBJECTIVE BOX
INTERVAL HPI/OVERNIGHT EVENTS:  Pt seen and examined at bedside. No acute overnight events or complaints.    Brief Daily Plan:  - Pending ROSALBA.    VITAL SIGNS:  T(F): 97.9 (10-22-24 @ 04:37)  HR: 73 (10-22-24 @ 04:37)  BP: 120/77 (10-22-24 @ 04:37)  RR: 18 (10-22-24 @ 04:37)  SpO2: 96% (10-22-24 @ 04:37)  Wt(kg): --    PHYSICAL EXAM:    Constitutional: WDWN, NAD  HEENT: PERRL, EOMI, sclera non-icteric, neck supple, trachea midline, no masses, no JVD, MMM, good dentition  Respiratory: CTA b/l, good air entry b/l, no wheezing, no rhonchi, no rales, without accessory muscle use and no intercostal retractions  Cardiovascular: RRR, normal S1S2, no M/R/G  Gastrointestinal: soft, NTND, no masses palpable, BS normal  Extremities: Warm, well perfused, pulses equal bilateral upper and lower extremities, no edema, no clubbing. Capillary refill <2 sec  Neurological: AAOx3, CN Grossly intact  Skin: Normal temperature, warm, dry    MEDICATIONS  (STANDING):  aMIOdarone    Tablet 200 milliGRAM(s) Oral daily  amLODIPine   Tablet 10 milliGRAM(s) Oral daily  apixaban 5 milliGRAM(s) Enteral Tube every 12 hours  atorvastatin 40 milliGRAM(s) Oral at bedtime  dextrose 5%. 1000 milliLiter(s) (100 mL/Hr) IV Continuous <Continuous>  dextrose 5%. 1000 milliLiter(s) (50 mL/Hr) IV Continuous <Continuous>  dextrose 50% Injectable 12.5 Gram(s) IV Push once  dextrose 50% Injectable 25 Gram(s) IV Push once  dextrose 50% Injectable 25 Gram(s) IV Push once  dextrose Oral Gel 15 Gram(s) Oral once  fluticasone propionate/ salmeterol 250-50 MICROgram(s) Diskus 1 Dose(s) Inhalation two times a day  glucagon  Injectable 1 milliGRAM(s) IntraMuscular once  insulin lispro (ADMELOG) corrective regimen sliding scale   SubCutaneous every 6 hours  metoprolol tartrate 25 milliGRAM(s) Oral two times a day  pantoprazole   Suspension 40 milliGRAM(s) Oral daily  polyethylene glycol 3350 17 Gram(s) Oral daily  thiamine 100 milliGRAM(s) Oral daily    MEDICATIONS  (PRN):  acetaminophen     Tablet .. 650 milliGRAM(s) Oral every 6 hours PRN Mild Pain (1 - 3), Moderate Pain (4 - 6)  albuterol/ipratropium for Nebulization 3 milliLiter(s) Nebulizer every 6 hours PRN Shortness of Breath and/or Wheezing      Allergies    No Known Allergies    Intolerances        LABS:                        10.6   7.89  )-----------( 200      ( 21 Oct 2024 06:35 )             33.7     10-21    138  |  97  |  15  ----------------------------<  131[H]  4.1   |  27  |  0.69    Ca    10.1      21 Oct 2024 06:35  Phos  4.3     10-21  Mg     2.0     10-21        Urinalysis Basic - ( 21 Oct 2024 06:35 )    Color: x / Appearance: x / SG: x / pH: x  Gluc: 131 mg/dL / Ketone: x  / Bili: x / Urobili: x   Blood: x / Protein: x / Nitrite: x   Leuk Esterase: x / RBC: x / WBC x   Sq Epi: x / Non Sq Epi: x / Bacteria: x        RADIOLOGY & ADDITIONAL TESTS:  Reviewed  ******************  Authored By: Henok Nava MD, PGY1  MS Teams Preferred  ******************   INTERVAL HPI/OVERNIGHT EVENTS:  Pt seen and examined at bedside. No acute overnight events or complaints.    Brief Daily Plan:  - Pending ROSALBA.    VITAL SIGNS:  T(F): 97.9 (10-22-24 @ 04:37)  HR: 73 (10-22-24 @ 04:37)  BP: 120/77 (10-22-24 @ 04:37)  RR: 18 (10-22-24 @ 04:37)  SpO2: 96% (10-22-24 @ 04:37)  Wt(kg): --    PHYSICAL EXAM:    CONSTITUTIONAL: NAD, comfortable, AAOx3  HEENT: MMM  RESPIRATORY: Normal respiratory effort; lungs are clear to auscultation bilaterally  CARDIOVASCULAR: Regular rate and rhythm, normal S1 and S2, +systolic murmur  ABDOMEN: Nontender to palpation, normoactive bowel sounds, no rebound/guarding, PEG tube in place no erythema pus or tenderness but only trace dried blood seen  MUSCULOSKELETAL:  Moving limbs spontaneously; RUE in sling; No lower extremity edema  PSYCH: Affect appropriate, pleasant    MEDICATIONS  (STANDING):  aMIOdarone    Tablet 200 milliGRAM(s) Oral daily  amLODIPine   Tablet 10 milliGRAM(s) Oral daily  apixaban 5 milliGRAM(s) Enteral Tube every 12 hours  atorvastatin 40 milliGRAM(s) Oral at bedtime  dextrose 5%. 1000 milliLiter(s) (100 mL/Hr) IV Continuous <Continuous>  dextrose 5%. 1000 milliLiter(s) (50 mL/Hr) IV Continuous <Continuous>  dextrose 50% Injectable 12.5 Gram(s) IV Push once  dextrose 50% Injectable 25 Gram(s) IV Push once  dextrose 50% Injectable 25 Gram(s) IV Push once  dextrose Oral Gel 15 Gram(s) Oral once  fluticasone propionate/ salmeterol 250-50 MICROgram(s) Diskus 1 Dose(s) Inhalation two times a day  glucagon  Injectable 1 milliGRAM(s) IntraMuscular once  insulin lispro (ADMELOG) corrective regimen sliding scale   SubCutaneous every 6 hours  metoprolol tartrate 25 milliGRAM(s) Oral two times a day  pantoprazole   Suspension 40 milliGRAM(s) Oral daily  polyethylene glycol 3350 17 Gram(s) Oral daily  thiamine 100 milliGRAM(s) Oral daily    MEDICATIONS  (PRN):  acetaminophen     Tablet .. 650 milliGRAM(s) Oral every 6 hours PRN Mild Pain (1 - 3), Moderate Pain (4 - 6)  albuterol/ipratropium for Nebulization 3 milliLiter(s) Nebulizer every 6 hours PRN Shortness of Breath and/or Wheezing      Allergies    No Known Allergies    Intolerances        LABS:                        10.6   7.89  )-----------( 200      ( 21 Oct 2024 06:35 )             33.7     10-21    138  |  97  |  15  ----------------------------<  131[H]  4.1   |  27  |  0.69    Ca    10.1      21 Oct 2024 06:35  Phos  4.3     10-21  Mg     2.0     10-21        Urinalysis Basic - ( 21 Oct 2024 06:35 )    Color: x / Appearance: x / SG: x / pH: x  Gluc: 131 mg/dL / Ketone: x  / Bili: x / Urobili: x   Blood: x / Protein: x / Nitrite: x   Leuk Esterase: x / RBC: x / WBC x   Sq Epi: x / Non Sq Epi: x / Bacteria: x        RADIOLOGY & ADDITIONAL TESTS:  Reviewed  ******************  Authored By: Henok Nava MD, PGY1  MS Teams Preferred  ******************

## 2024-10-22 NOTE — PROGRESS NOTE ADULT - ATTENDING COMMENTS
69 year old male, with PMH of HTN, DM2, ?CVA/TIA, Aflutter, CAD s/p CABG (2018), EtOH use disorder (two 40oz beers daily), current smoker (8 cigs/day), L total hip arthroplasty, initially admitted to [facility] (9/14/24-10/4/24) after mech fall c/b R prox humeral fx and EtOH withdrawal. Was on CIWA with Librium/Ativan PRN. TTE on 9/15/24 showed LVEF 60-65%, grad I DD, and an aortic valve prosthesis with normal function. Course c/b AMS, acute hypoxemic/hypercapnic respiratory, and agonal breathing on 9/18/24. Was initially given flumazenil x2 with some improvement, but was intubated for airway protection given excessive secretions and transferred to MICU 9/18/24. CTA chest was somewhat limited study but did not find PE at the time, noted RUL GGOs "suspicious for atypical or viral infection." CTH showed subacute and chronic SDHs as well as chronic lacunar infarct within L lentiform nucleus. Repeat CT showed stable SDHs. Extubated on 9/19/24. Pt was downgraded to Medicine floors on 9/21/24. Noted to have loss of voice and dysphagia. Evaluated by S+S, found dysphagia on video flouroscopy to all textures and recommended for PEG tube, but pt refused and opted for NGT placement. Ortho surgery that was planned for humeral fx was cancelled and ultimately recommended for conservative management only. CXR on 10/3/24 showed new LLL infiltrate, started on Zosyn. On 10/4/24, pt desaturated on supplemental O2, CTA chest found massive PE in R main bronchus with pulm trunk dilatation. BLE duplex also found b/l LE DVTs (L>R). Started on hep drip and transferred to [facility] for embolectomy evaluation.    #Acute hypoxemic respiratory failure  #R main bronchus PE, b/l LE DVTs  #PNA  #Acute/subacute CVA  #Dysphagia  #R humeral fx  #Chronic Aflutter  #DM2  #EtOH/tobacco use disorder    - Now on room air  - Completed 7 day course of Zosyn   - c/w amiodarone and metoprolol (was on at OSH)  - c/w Advair BID and duonebs q6h PRN  - c/w low ISS for now, monitor FS  - Monitor on telemetry  - MRI head showing acute/subacute infarcts; neuro recs appreciated; Acetylcholine labs wnl; LRP4 autoantibody test, MuSK antibody test received and pending.   - Continue statin, thiamine  - TTE with no acute findings   - Speech and swallow, NPO, FEES -> did not pass study   - s/p Eliquis load, continue Eliquis 5mg BID  - Ortho - outpatient f/u   - s/p PEG - tolerating tube feeds at goal    - GOC: DNR/trial of intubation; MOLST form completed and placed in chart   - Stable for DC, CM f/u for dc to [facility]; pending auth  Rest as above. Discussed with HS.     Time-based billing (NON-critical care).     39 minutes spent on total encounter. The necessity of the time spent during the encounter on this date of service was due to:     - Reviewing, and interpreting labs, testing, and imaging.  - Independently obtaining a review of systems and performing a physical exam  - Reviewing prior records and where necessary, outpatient records.  - Counselling and educating patient regarding interpretation of aforementioned items and plan of care.  - Does not include time spent on resident education.

## 2024-10-22 NOTE — PROGRESS NOTE ADULT - PROBLEM SELECTOR PLAN 3
- US of b/l LEs at [facility] -> R peroneal vein and L popliteal, tibial trunk, peroneal, and posterior tibial DVTs  > Repeat US of b/l LEs showing mulitple DVTs  > off heparin, now on Eliquis 5mg BID

## 2024-10-23 LAB
ANION GAP SERPL CALC-SCNC: 15 MMOL/L — SIGNIFICANT CHANGE UP (ref 5–17)
BUN SERPL-MCNC: 15 MG/DL — SIGNIFICANT CHANGE UP (ref 7–23)
CALCIUM SERPL-MCNC: 10.1 MG/DL — SIGNIFICANT CHANGE UP (ref 8.4–10.5)
CHLORIDE SERPL-SCNC: 97 MMOL/L — SIGNIFICANT CHANGE UP (ref 96–108)
CO2 SERPL-SCNC: 26 MMOL/L — SIGNIFICANT CHANGE UP (ref 22–31)
CREAT SERPL-MCNC: 0.67 MG/DL — SIGNIFICANT CHANGE UP (ref 0.5–1.3)
EGFR: 102 ML/MIN/1.73M2 — SIGNIFICANT CHANGE UP
GLUCOSE BLDC GLUCOMTR-MCNC: 116 MG/DL — HIGH (ref 70–99)
GLUCOSE BLDC GLUCOMTR-MCNC: 128 MG/DL — HIGH (ref 70–99)
GLUCOSE BLDC GLUCOMTR-MCNC: 141 MG/DL — HIGH (ref 70–99)
GLUCOSE BLDC GLUCOMTR-MCNC: 150 MG/DL — HIGH (ref 70–99)
GLUCOSE SERPL-MCNC: 129 MG/DL — HIGH (ref 70–99)
HCT VFR BLD CALC: 33.7 % — LOW (ref 39–50)
HGB BLD-MCNC: 10.9 G/DL — LOW (ref 13–17)
MAGNESIUM SERPL-MCNC: 2.1 MG/DL — SIGNIFICANT CHANGE UP (ref 1.6–2.6)
MCHC RBC-ENTMCNC: 29.3 PG — SIGNIFICANT CHANGE UP (ref 27–34)
MCHC RBC-ENTMCNC: 32.3 GM/DL — SIGNIFICANT CHANGE UP (ref 32–36)
MCV RBC AUTO: 90.6 FL — SIGNIFICANT CHANGE UP (ref 80–100)
NRBC # BLD: 0 /100 WBCS — SIGNIFICANT CHANGE UP (ref 0–0)
PHOSPHATE SERPL-MCNC: 4.4 MG/DL — SIGNIFICANT CHANGE UP (ref 2.5–4.5)
PLATELET # BLD AUTO: 223 K/UL — SIGNIFICANT CHANGE UP (ref 150–400)
POTASSIUM SERPL-MCNC: 3.9 MMOL/L — SIGNIFICANT CHANGE UP (ref 3.5–5.3)
POTASSIUM SERPL-SCNC: 3.9 MMOL/L — SIGNIFICANT CHANGE UP (ref 3.5–5.3)
RBC # BLD: 3.72 M/UL — LOW (ref 4.2–5.8)
RBC # FLD: 13.3 % — SIGNIFICANT CHANGE UP (ref 10.3–14.5)
SODIUM SERPL-SCNC: 138 MMOL/L — SIGNIFICANT CHANGE UP (ref 135–145)
WBC # BLD: 6.49 K/UL — SIGNIFICANT CHANGE UP (ref 3.8–10.5)
WBC # FLD AUTO: 6.49 K/UL — SIGNIFICANT CHANGE UP (ref 3.8–10.5)

## 2024-10-23 PROCEDURE — 99232 SBSQ HOSP IP/OBS MODERATE 35: CPT | Mod: GC

## 2024-10-23 RX ADMIN — FLUTICASONE PROPIONATE AND SALMETEROL XINAFOATE 1 DOSE(S): 230; 21 AEROSOL, METERED RESPIRATORY (INHALATION) at 05:29

## 2024-10-23 RX ADMIN — Medication 10 MILLIGRAM(S): at 05:26

## 2024-10-23 RX ADMIN — Medication 200 MILLIGRAM(S): at 05:25

## 2024-10-23 RX ADMIN — APIXABAN 5 MILLIGRAM(S): 5 TABLET, FILM COATED ORAL at 17:04

## 2024-10-23 RX ADMIN — POLYETHYLENE GLYCOL 3350 17 GRAM(S): 17 POWDER, FOR SOLUTION ORAL at 11:18

## 2024-10-23 RX ADMIN — Medication 25 MILLIGRAM(S): at 05:26

## 2024-10-23 RX ADMIN — Medication 100 MILLIGRAM(S): at 11:17

## 2024-10-23 RX ADMIN — Medication 25 MILLIGRAM(S): at 17:04

## 2024-10-23 RX ADMIN — APIXABAN 5 MILLIGRAM(S): 5 TABLET, FILM COATED ORAL at 05:26

## 2024-10-23 RX ADMIN — FLUTICASONE PROPIONATE AND SALMETEROL XINAFOATE 1 DOSE(S): 230; 21 AEROSOL, METERED RESPIRATORY (INHALATION) at 17:04

## 2024-10-23 RX ADMIN — PANTOPRAZOLE SODIUM 40 MILLIGRAM(S): 40 TABLET, DELAYED RELEASE ORAL at 11:17

## 2024-10-23 NOTE — PROGRESS NOTE ADULT - ASSESSMENT
Mr. Jarrett Overton is a 69-year-old w/ PMH of HTN, T2DM, CVA, A. flutter, CAD s/p CABG x3 (~2018), and alcohol use disorder who initially presented to [facility] on 9/14 for a mechanical fall. On 10/4, patient was found to be hypoxic with subsequent CTA chest showing large PE of R main pulmonary artery. US of b/l LEs was further significant for b/l DVTs in the R peroneal vein and L popliteal, tibial trunk, peroneal, and posterior tibial veins. Patient was transferred to [facility] for further management of PE and tx of superimposed PNA, now s/p PEG placement, dispo [facility].

## 2024-10-23 NOTE — CHART NOTE - NSCHARTNOTEFT_GEN_A_CORE
NUTRITION FOLLOW UP NOTE    PATIENT SEEN FOR: Malnutrition/Enteral Nutrition Follow Up     SOURCE: [X] Patient  [x] Current Medical Record  [X] RN  [] Family/support person at bedside  [] Patient unavailable/inappropriate  [] Other:    CHART REVIEWED/EVENTS NOTED.  [] No changes to nutrition care plan to note  [x] Nutrition Status:  - S/p PEG placement (10/16)     DIET ORDER:   Diet, NPO with Tube Feed:   Tube Feeding Modality: Gastrostomy  Glucerna 1.2 Ronaldo (GLUCERNARTH)  Total Volume for 24 Hours (mL): 1560  Continuous  Starting Tube Feed Rate {mL per Hour}: 40  Increase Tube Feed Rate by (mL): 10     Every 4 hours  Until Goal Tube Feed Rate (mL per Hour): 65  Tube Feed Duration (in Hours): 24  Tube Feed Start Time: 17:00  Talat(7 Gm Arginine/7 Gm Glut/1.2 Gm HMB     Qty per Day:  2 (10-18-)      CURRENT DIET ORDER IS:  [] Appropriate:  [X] Inadequate:   See recommendations below  [] Other:    NUTRITION INTAKE/PROVISION:  [] PO:  [X] Enteral Nutrition:  [X] Enteral Nutrition:  - Reports tolerating current EN regimen via NG tube with Glucerna 1.2 @ 65 mL/hr; however, endorses ongoing hunger. See recommendations below.  - EN Regimen at goal provides 1560 mL total volume, 1872 kcal/day, 93.6 g pro/day, 1255 mL free water/day; provides ~28 kcal/kg and ~1.4 g/kg; based on current dosing weight 67.7 kg. Defer free water flushes to team.     Protein Modular(s) Provides: N/A    Current Pump Rate: 65 mL/hr  EN provision 50% x 2 days - ? complete accuracy iso incomplete documentation.  [] Parenteral Nutrition:    ANTHROPOMETRICS:  Drug Dosing Weight  Height (cm): 175.3 (per [facility])  Weight (kg): 67.7 (04 Oct 2024 22:19)  BMI (kg/m^2): 22.1 (based on updated height)    Weights:   Pt reports UBW: 81.8 kg (x 6 mos); endorses gradual, unintentional wt loss.   Daily wts in k (10/22), 66.2 (10/11, bed), 67.7 (10/4, bed). Suspected wt loss of 6.9% x < 2 weeks (clinically significant, based on wts from 10/4 and 10/22); overall suspected wt loss of 23% x 6 mos (clinically significant, based on wts from 10/22 and reported UBW).   RD will continue to monitor wt trends as able/available.       NUTRITIONALLY PERTINENT MEDICATIONS:  MEDICATIONS  (STANDING):  aMIOdarone    Tablet  amLODIPine   Tablet  atorvastatin  dextrose 5%.  dextrose 5%.  dextrose 50% Injectable  dextrose 50% Injectable  dextrose 50% Injectable  dextrose Oral Gel  glucagon  Injectable  insulin lispro (ADMELOG) corrective regimen sliding scale  metoprolol tartrate  pantoprazole   Suspension  polyethylene glycol 3350  thiamine       NUTRITIONALLY PERTINENT LABS:  10-23 Na138 mmol/L Glu 129 mg/dL[H] K+ 3.9 mmol/L Cr  0.67 mg/dL BUN 15 mg/dL 10-23 Phos 4.4 mg/dL 10-18 Alb 3.8 g/dL 10-10 Chol 159 mg/dL LDL --    HDL 34 mg/dL[L] Trig 114 mg/dL10-18 ALT 13 U/L AST 11 U/L Alkaline Phosphatase 96 U/L  10-05-24 @ 14:44 a1c 6.1    A1C with Estimated Average Glucose Result: 6.1 % (10-05-24 @ 14:44)      Finger Sticks:  POCT Blood Glucose.: 150 mg/dL (10-23 @ 11:33)  POCT Blood Glucose.: 141 mg/dL (10-23 @ 05:27)  POCT Blood Glucose.: 116 mg/dL (10-23 @ 00:12)  POCT Blood Glucose.: 121 mg/dL (10-22 @ 19:35)      NUTRITIONALLY PERTINENT MEDICATIONS/LABS:  [x] Reviewed  [X] Relevant notes on medications/labs:  - POCTs x 24 hrs WNL. Ordered for SSI.   - Ordered for thiamine supplementation.     EDEMA:  [x] Reviewed  [] Relevant notes:    GI/ I&O:  [x] Reviewed  [X] Relevant notes: Last BM 10/20. Ordered for Miralax.   [X] Other: Ordered for PPI.    SKIN:   [] No pressure injuries documented, per nursing flowsheet  [] Pressure injury previously noted  [x] Change in pressure injury documentation: Per wound care (10/17), pt with "Right elbow wound pressure injury vs. traumatic wound"  [] Other:    ESTIMATED NEEDS:  [] No change:  [x] Updated:  Energy: 2181 - 2545  kcal/day (30 - 35 kcal/kg)  Protein:  87 - 116 g/day (1.2 - 1.6 g/kg)  Fluid:   ml/day or [] defer to team  Based on: IBW of 72.7 kg in consideration of pressure injury vs traumatic wound    [X] Prior Dx: (1) Moderate Protein Calorie Malnutrition in Context of Acute Illness (2) Increased Nutrient Needs  [] New Dx:     EDUCATION:  [] Yes:  [X] Not appropriate/warranted    NUTRITION CARE PLAN:  [X] To better meet EER, advance EN regimen via PEG tube as follows: Glucerna 1.5 @ 65 mL/hr x 24 hrs; EN Regimen at goal provides 1560 mL total volume, 2160 kcal/day, 119.5 g pro/day,  1092 mL free water/day; provides ~ 30 kcal/kg and ~1.6 g/kg; based on current IBW of 72.7 kg. Defer free water flushes to team.           [X] RD remains available upon request for TF formulary/rate adjustments prn.   [X] As medically feasible, consider the following micronutrient supplementation           [X] Add multivitamin and folic acid; continue with thiamine secondary to hx of EtOH use           [X] Add vitamin C to aid in wound healing as medically feasible  [X] Continue with Talat 2x/d via PEG to aid in wound healing as medically feasible     [] Achieved - Continue current nutrition intervention(s)  [] Current medical condition precludes nutrition intervention at this time.    MONITORING AND EVALUATION:   ALBERTA remains available upon request and will follow up per protocol.    Jessica Villegas RD  Available on MS TEAMS. NUTRITION FOLLOW UP NOTE    PATIENT SEEN FOR: Malnutrition/Enteral Nutrition Follow Up     SOURCE: [X] Patient  [x] Current Medical Record  [X] RN  [] Family/support person at bedside  [] Patient unavailable/inappropriate  [] Other:    CHART REVIEWED/EVENTS NOTED.  [] No changes to nutrition care plan to note  [x] Nutrition Status:  - S/p PEG placement (10/16)     DIET ORDER:   Diet, NPO with Tube Feed:   Tube Feeding Modality: Gastrostomy  Glucerna 1.2 Ronaldo (GLUCERNARTH)  Total Volume for 24 Hours (mL): 1560  Continuous  Starting Tube Feed Rate {mL per Hour}: 40  Increase Tube Feed Rate by (mL): 10     Every 4 hours  Until Goal Tube Feed Rate (mL per Hour): 65  Tube Feed Duration (in Hours): 24  Tube Feed Start Time: 17:00  Talat(7 Gm Arginine/7 Gm Glut/1.2 Gm HMB     Qty per Day:  2 (10-18-24)      CURRENT DIET ORDER IS:  [] Appropriate:  [X] Inadequate:   See recommendations below  [] Other:    NUTRITION INTAKE/PROVISION:  [] PO:  [X] Enteral Nutrition:  - Reports tolerating current EN regimen via NG tube with Glucerna 1.2 @ 65 mL/hr; however, endorses ongoing hunger. See recommendations below.  - EN Regimen at goal provides 1560 mL total volume, 1872 kcal/day, 93.6 g pro/day, 1255 mL free water/day; provides ~28 kcal/kg and ~1.4 g/kg; based on current dosing weight 67.7 kg. Defer free water flushes to team.     Protein Modular(s) Provides: N/A    Current Pump Rate: 65 mL/hr  EN provision 50% x 2 days - ? complete accuracy iso incomplete documentation.  [] Parenteral Nutrition:    ANTHROPOMETRICS:  Drug Dosing Weight  Height (cm): 175.3 (per [facility])  Weight (kg): 67.7 (04 Oct 2024 22:19)  BMI (kg/m^2): 22.1 (based on updated height)    Weights:   Pt reports UBW: 81.8 kg (x 6 mos); endorses gradual, unintentional wt loss.   Daily wts in k (10/22), 66.2 (10/11, bed), 67.7 (10/4, bed). Suspected wt loss of 6.9% x < 2 weeks (clinically significant, based on wts from 10/4 and 10/22); overall suspected wt loss of 23% x 6 mos (clinically significant, based on wts from 10/22 and reported UBW).   RD will continue to monitor wt trends as able/available.       NUTRITIONALLY PERTINENT MEDICATIONS:  MEDICATIONS  (STANDING):  aMIOdarone    Tablet  amLODIPine   Tablet  atorvastatin  dextrose 5%.  dextrose 5%.  dextrose 50% Injectable  dextrose 50% Injectable  dextrose 50% Injectable  dextrose Oral Gel  glucagon  Injectable  insulin lispro (ADMELOG) corrective regimen sliding scale  metoprolol tartrate  pantoprazole   Suspension  polyethylene glycol 3350  thiamine       NUTRITIONALLY PERTINENT LABS:  10-23 Na138 mmol/L Glu 129 mg/dL[H] K+ 3.9 mmol/L Cr  0.67 mg/dL BUN 15 mg/dL 10-23 Phos 4.4 mg/dL 10-18 Alb 3.8 g/dL 10-10 Chol 159 mg/dL LDL --    HDL 34 mg/dL[L] Trig 114 mg/dL10-18 ALT 13 U/L AST 11 U/L Alkaline Phosphatase 96 U/L  10-05-24 @ 14:44 a1c 6.1    A1C with Estimated Average Glucose Result: 6.1 % (10-05-24 @ 14:44)      Finger Sticks:  POCT Blood Glucose.: 150 mg/dL (10-23 @ 11:33)  POCT Blood Glucose.: 141 mg/dL (10-23 @ 05:27)  POCT Blood Glucose.: 116 mg/dL (10-23 @ 00:12)  POCT Blood Glucose.: 121 mg/dL (10-22 @ 19:35)      NUTRITIONALLY PERTINENT MEDICATIONS/LABS:  [x] Reviewed  [X] Relevant notes on medications/labs:  - POCTs x 24 hrs WNL. Ordered for SSI.   - Ordered for thiamine supplementation.     EDEMA:  [x] Reviewed  [] Relevant notes:    GI/ I&O:  [x] Reviewed  [X] Relevant notes: Last BM 10/20. Ordered for Miralax.   [X] Other: Ordered for PPI.    SKIN:   [] No pressure injuries documented, per nursing flowsheet  [] Pressure injury previously noted  [x] Change in pressure injury documentation: Per wound care (10/17), pt with "Right elbow wound pressure injury vs. traumatic wound"  [] Other:    ESTIMATED NEEDS:  [] No change:  [x] Updated:  Energy: 2181 - 2545  kcal/day (30 - 35 kcal/kg)  Protein:  87 - 116 g/day (1.2 - 1.6 g/kg)  Fluid:   ml/day or [] defer to team  Based on: IBW of 72.7 kg in consideration of pressure injury vs traumatic wound    [X] Prior Dx: (1) Moderate Protein Calorie Malnutrition in Context of Acute Illness (2) Increased Nutrient Needs  [] New Dx:     EDUCATION:  [] Yes:  [X] Not appropriate/warranted    NUTRITION CARE PLAN:  [X] To better meet EER, advance EN regimen via PEG tube as follows: Glucerna 1.5 @ 65 mL/hr x 24 hrs; EN Regimen at goal provides 1560 mL total volume, 2160 kcal/day, 119.5 g pro/day,  1092 mL free water/day; provides ~ 30 kcal/kg and ~1.6 g/kg; based on current IBW of 72.7 kg. Defer free water flushes to team.           [X] RD remains available upon request for TF formulary/rate adjustments prn.   [X] As medically feasible, consider the following micronutrient supplementation           [X] Add multivitamin and folic acid; continue with thiamine secondary to hx of EtOH use           [X] Add vitamin C to aid in wound healing as medically feasible  [X] Continue with Talat 2x/d via PEG to aid in wound healing as medically feasible     [] Achieved - Continue current nutrition intervention(s)  [] Current medical condition precludes nutrition intervention at this time.    MONITORING AND EVALUATION:   RD remains available upon request and will follow up per protocol.    Jessica Villegas RD  Available on MS TEAMS.

## 2024-10-23 NOTE — PROGRESS NOTE ADULT - PROBLEM SELECTOR PLAN 4
- Initially presented to [facility] on 9/14 for a mechanical fall after he tripped and fell onto his R shoulder  - XR showing R proximal humerus fracture, ORIF with Orthopedic Surgery at [facility] cancelled due to MICU course  > Orthopedic surgery on 10/7 said can follow-up outpt orthopedic surgery.

## 2024-10-23 NOTE — PROGRESS NOTE ADULT - SUBJECTIVE AND OBJECTIVE BOX
INTERVAL HPI/OVERNIGHT EVENTS:  Pt seen and examined at bedside. No acute overnight events or complaints.    Brief Daily Plan:  -    VITAL SIGNS:  T(F): 98.8 (10-23-24 @ 04:51)  HR: 71 (10-23-24 @ 04:51)  BP: 105/71 (10-23-24 @ 04:51)  RR: 18 (10-23-24 @ 04:51)  SpO2: 97% (10-23-24 @ 04:51)  Wt(kg): --    PHYSICAL EXAM:    CONSTITUTIONAL: NAD, comfortable, AAOx3  HEENT: MMM  RESPIRATORY: Normal respiratory effort; lungs are clear to auscultation bilaterally  CARDIOVASCULAR: Regular rate and rhythm, normal S1 and S2, +systolic murmur  ABDOMEN: Nontender to palpation, normoactive bowel sounds, no rebound/guarding, PEG tube in place no erythema pus or tenderness but only trace dried blood seen  MUSCULOSKELETAL:  Moving limbs spontaneously; RUE in sling; No lower extremity edema  PSYCH: Affect appropriate, pleasant    MEDICATIONS  (STANDING):  aMIOdarone    Tablet 200 milliGRAM(s) Oral daily  amLODIPine   Tablet 10 milliGRAM(s) Oral daily  apixaban 5 milliGRAM(s) Enteral Tube every 12 hours  atorvastatin 40 milliGRAM(s) Oral at bedtime  dextrose 5%. 1000 milliLiter(s) (100 mL/Hr) IV Continuous <Continuous>  dextrose 5%. 1000 milliLiter(s) (50 mL/Hr) IV Continuous <Continuous>  dextrose 50% Injectable 12.5 Gram(s) IV Push once  dextrose 50% Injectable 25 Gram(s) IV Push once  dextrose 50% Injectable 25 Gram(s) IV Push once  dextrose Oral Gel 15 Gram(s) Oral once  fluticasone propionate/ salmeterol 250-50 MICROgram(s) Diskus 1 Dose(s) Inhalation two times a day  glucagon  Injectable 1 milliGRAM(s) IntraMuscular once  insulin lispro (ADMELOG) corrective regimen sliding scale   SubCutaneous every 6 hours  metoprolol tartrate 25 milliGRAM(s) Oral two times a day  pantoprazole   Suspension 40 milliGRAM(s) Oral daily  polyethylene glycol 3350 17 Gram(s) Oral daily  thiamine 100 milliGRAM(s) Oral daily    MEDICATIONS  (PRN):  acetaminophen     Tablet .. 650 milliGRAM(s) Oral every 6 hours PRN Mild Pain (1 - 3), Moderate Pain (4 - 6)  albuterol/ipratropium for Nebulization 3 milliLiter(s) Nebulizer every 6 hours PRN Shortness of Breath and/or Wheezing      Allergies    No Known Allergies    Intolerances        LABS:                        10.9   6.49  )-----------( 223      ( 23 Oct 2024 06:49 )             33.7                 RADIOLOGY & ADDITIONAL TESTS:  Reviewed  ******************  Authored By: Henok Nava MD, PGY1  MS Teams Preferred  ******************   INTERVAL HPI/OVERNIGHT EVENTS:  Pt seen and examined at bedside. No acute overnight events or complaints.    Brief Daily Plan:  - Pending [facility]  - Wants more food will reach out to nutrition to increase rates of feed    VITAL SIGNS:  T(F): 98.8 (10-23-24 @ 04:51)  HR: 71 (10-23-24 @ 04:51)  BP: 105/71 (10-23-24 @ 04:51)  RR: 18 (10-23-24 @ 04:51)  SpO2: 97% (10-23-24 @ 04:51)  Wt(kg): --    PHYSICAL EXAM:    CONSTITUTIONAL: NAD, comfortable, AAOx3  HEENT: MMM  RESPIRATORY: Normal respiratory effort; lungs are clear to auscultation bilaterally  CARDIOVASCULAR: Regular rate and rhythm, normal S1 and S2, +systolic murmur  ABDOMEN: Nontender to palpation, normoactive bowel sounds, no rebound/guarding, PEG tube in place no erythema pus or tenderness but only trace dried blood seen  MUSCULOSKELETAL:  Moving limbs spontaneously; RUE in sling; No lower extremity edema  PSYCH: Affect appropriate, pleasant    MEDICATIONS  (STANDING):  aMIOdarone    Tablet 200 milliGRAM(s) Oral daily  amLODIPine   Tablet 10 milliGRAM(s) Oral daily  apixaban 5 milliGRAM(s) Enteral Tube every 12 hours  atorvastatin 40 milliGRAM(s) Oral at bedtime  dextrose 5%. 1000 milliLiter(s) (100 mL/Hr) IV Continuous <Continuous>  dextrose 5%. 1000 milliLiter(s) (50 mL/Hr) IV Continuous <Continuous>  dextrose 50% Injectable 12.5 Gram(s) IV Push once  dextrose 50% Injectable 25 Gram(s) IV Push once  dextrose 50% Injectable 25 Gram(s) IV Push once  dextrose Oral Gel 15 Gram(s) Oral once  fluticasone propionate/ salmeterol 250-50 MICROgram(s) Diskus 1 Dose(s) Inhalation two times a day  glucagon  Injectable 1 milliGRAM(s) IntraMuscular once  insulin lispro (ADMELOG) corrective regimen sliding scale   SubCutaneous every 6 hours  metoprolol tartrate 25 milliGRAM(s) Oral two times a day  pantoprazole   Suspension 40 milliGRAM(s) Oral daily  polyethylene glycol 3350 17 Gram(s) Oral daily  thiamine 100 milliGRAM(s) Oral daily    MEDICATIONS  (PRN):  acetaminophen     Tablet .. 650 milliGRAM(s) Oral every 6 hours PRN Mild Pain (1 - 3), Moderate Pain (4 - 6)  albuterol/ipratropium for Nebulization 3 milliLiter(s) Nebulizer every 6 hours PRN Shortness of Breath and/or Wheezing      Allergies    No Known Allergies    Intolerances        LABS:                        10.9   6.49  )-----------( 223      ( 23 Oct 2024 06:49 )             33.7                 RADIOLOGY & ADDITIONAL TESTS:  Reviewed  ******************  Authored By: Henok Nava MD, PGY1  MS Teams Preferred  ******************

## 2024-10-23 NOTE — PROGRESS NOTE ADULT - PROBLEM SELECTOR PLAN 2
# RESOLVED    Hospital course at [facility] c/b AHRF s/p intubation on 9/18 for likely benzodiazepine overdose, extubated on 9/19. Diffuse rhonchi with copious secretions on exam. CTA chest (10/4) -> Large PE of R main pulmonary artery, scattered GGOs and interstitial changes predominantly in R and L lower lobes. Likely PE +/- superimposed aspiration PNA s/p empiric Zosyn for 7d (10/5-10/12).    - C/w management of PE, as above  - Now on RA

## 2024-10-23 NOTE — PROGRESS NOTE ADULT - ATTENDING COMMENTS
69 year old male, with PMH of HTN, DM2, ?CVA/TIA, Aflutter, CAD s/p CABG (2018), EtOH use disorder (two 40oz beers daily), current smoker (8 cigs/day), L total hip arthroplasty, initially admitted to [facility] (9/14/24-10/4/24) after mech fall c/b R prox humeral fx and EtOH withdrawal. Was on CIWA with Librium/Ativan PRN. TTE on 9/15/24 showed LVEF 60-65%, grad I DD, and an aortic valve prosthesis with normal function. Course c/b AMS, acute hypoxemic/hypercapnic respiratory, and agonal breathing on 9/18/24. Was initially given flumazenil x2 with some improvement, but was intubated for airway protection given excessive secretions and transferred to MICU 9/18/24. CTA chest was somewhat limited study but did not find PE at the time, noted RUL GGOs "suspicious for atypical or viral infection." CTH showed subacute and chronic SDHs as well as chronic lacunar infarct within L lentiform nucleus. Repeat CT showed stable SDHs. Extubated on 9/19/24. Pt was downgraded to Medicine floors on 9/21/24. Noted to have loss of voice and dysphagia. Evaluated by S+S, found dysphagia on video flouroscopy to all textures and recommended for PEG tube, but pt refused and opted for NGT placement. Ortho surgery that was planned for humeral fx was cancelled and ultimately recommended for conservative management only. CXR on 10/3/24 showed new LLL infiltrate, started on Zosyn. On 10/4/24, pt desaturated on supplemental O2, CTA chest found massive PE in R main bronchus with pulm trunk dilatation. BLE duplex also found b/l LE DVTs (L>R). Started on hep drip and transferred to [facility] for embolectomy evaluation.    #Acute hypoxemic respiratory failure  #R main bronchus PE, b/l LE DVTs  #PNA  #Acute/subacute CVA  #Dysphagia  #R humeral fx  #Chronic Aflutter  #DM2  #EtOH/tobacco use disorder    - Now on room air  - Completed 7 day course of Zosyn   - c/w amiodarone and metoprolol (was on at OSH)  - c/w Advair BID and duonebs q6h PRN  - c/w low ISS for now, monitor FS  - Monitor on telemetry  - MRI head showing acute/subacute infarcts; neuro recs appreciated; Acetylcholine labs wnl; LRP4 autoantibody test, MuSK antibody test received and pending.   - Continue statin, thiamine  - TTE with no acute findings   - Speech and swallow, NPO, FEES -> did not pass study   - s/p Eliquis load, continue Eliquis 5mg BID  - Ortho - outpatient f/u   - s/p PEG - tolerating tube feeds at goal    - GOC: DNR/trial of intubation; MOLST form completed and placed in chart   - Stable for DC, CM f/u for dc to [facility]; pending auth  Rest as above. Discussed with HS.     Time-based billing (NON-critical care).     37 minutes spent on total encounter. The necessity of the time spent during the encounter on this date of service was due to:     - Reviewing, and interpreting labs, testing, and imaging.  - Independently obtaining a review of systems and performing a physical exam  - Reviewing prior records and where necessary, outpatient records.  - Counselling and educating patient regarding interpretation of aforementioned items and plan of care.  - Does not include time spent on resident education.

## 2024-10-23 NOTE — PROGRESS NOTE ADULT - PROBLEM SELECTOR PLAN 1
- Hospital course at [facility] c/b AHRF  - CTA chest (10/4) -> Large PE of R main pulmonary artery  - US of b/l LEs -> R peroneal vein and L popliteal, tibial trunk, peroneal, and posterior tibial DVTs  - EKG showing evidence of right heart strain, S1Q3T3 changes, T wave depressions in precordial leads; not present on EKG from 9/15  - TTE (9/15) unremarkable  - Troponin 92, BNP 7168 on transfer  - s/p Eliquis loading now on Eliquis 5mg BID 10/17

## 2024-10-23 NOTE — PROGRESS NOTE ADULT - PROBLEM SELECTOR PLAN 3
- US of b/l LEs at [facility] -> R peroneal vein and L popliteal, tibial trunk, peroneal, and posterior tibial DVTs  > Repeat US of b/l LEs showing mulitple DVTs  > off heparin, now on Eliquis 5mg BID

## 2024-10-24 ENCOUNTER — TRANSCRIPTION ENCOUNTER (OUTPATIENT)
Age: 69
End: 2024-10-24

## 2024-10-24 VITALS
DIASTOLIC BLOOD PRESSURE: 77 MMHG | HEART RATE: 80 BPM | RESPIRATION RATE: 18 BRPM | TEMPERATURE: 99 F | SYSTOLIC BLOOD PRESSURE: 124 MMHG | OXYGEN SATURATION: 96 %

## 2024-10-24 LAB
GLUCOSE BLDC GLUCOMTR-MCNC: 116 MG/DL — HIGH (ref 70–99)
GLUCOSE BLDC GLUCOMTR-MCNC: 125 MG/DL — HIGH (ref 70–99)
GLUCOSE BLDC GLUCOMTR-MCNC: 145 MG/DL — HIGH (ref 70–99)
GLUCOSE BLDC GLUCOMTR-MCNC: 164 MG/DL — HIGH (ref 70–99)

## 2024-10-24 PROCEDURE — 99239 HOSP IP/OBS DSCHRG MGMT >30: CPT | Mod: GC

## 2024-10-24 RX ORDER — AMLODIPINE BESYLATE 10 MG
1 TABLET ORAL
Refills: 0 | DISCHARGE

## 2024-10-24 RX ORDER — INSULIN ASPART 100 [IU]/ML
0 INJECTION, SOLUTION INTRAVENOUS; SUBCUTANEOUS
Refills: 0 | DISCHARGE

## 2024-10-24 RX ORDER — AMLODIPINE BESYLATE 10 MG
1 TABLET ORAL
Qty: 0 | Refills: 0 | DISCHARGE
Start: 2024-10-24

## 2024-10-24 RX ORDER — PIPERACILLIN AND TAZOBACTAM .5; 4 G/20ML; G/20ML
4.5 INJECTION, POWDER, LYOPHILIZED, FOR SOLUTION INTRAVENOUS
Refills: 0 | DISCHARGE

## 2024-10-24 RX ORDER — HEPARIN SODIUM 10000 [USP'U]/ML
0 INJECTION INTRAVENOUS; SUBCUTANEOUS
Refills: 0 | DISCHARGE

## 2024-10-24 RX ORDER — MORPHINE SULFATE 30 MG/1
1 TABLET, EXTENDED RELEASE ORAL
Refills: 0 | DISCHARGE

## 2024-10-24 RX ADMIN — FLUTICASONE PROPIONATE AND SALMETEROL XINAFOATE 1 DOSE(S): 230; 21 AEROSOL, METERED RESPIRATORY (INHALATION) at 17:23

## 2024-10-24 RX ADMIN — Medication 25 MILLIGRAM(S): at 17:23

## 2024-10-24 RX ADMIN — POLYETHYLENE GLYCOL 3350 17 GRAM(S): 17 POWDER, FOR SOLUTION ORAL at 12:22

## 2024-10-24 RX ADMIN — Medication 40 MILLIGRAM(S): at 00:03

## 2024-10-24 RX ADMIN — Medication 1: at 12:23

## 2024-10-24 RX ADMIN — Medication 10 MILLIGRAM(S): at 05:59

## 2024-10-24 RX ADMIN — FLUTICASONE PROPIONATE AND SALMETEROL XINAFOATE 1 DOSE(S): 230; 21 AEROSOL, METERED RESPIRATORY (INHALATION) at 06:00

## 2024-10-24 RX ADMIN — APIXABAN 5 MILLIGRAM(S): 5 TABLET, FILM COATED ORAL at 17:23

## 2024-10-24 RX ADMIN — PANTOPRAZOLE SODIUM 40 MILLIGRAM(S): 40 TABLET, DELAYED RELEASE ORAL at 12:24

## 2024-10-24 RX ADMIN — Medication 100 MILLIGRAM(S): at 12:23

## 2024-10-24 RX ADMIN — APIXABAN 5 MILLIGRAM(S): 5 TABLET, FILM COATED ORAL at 05:59

## 2024-10-24 RX ADMIN — Medication 200 MILLIGRAM(S): at 05:59

## 2024-10-24 RX ADMIN — Medication 25 MILLIGRAM(S): at 05:59

## 2024-10-24 NOTE — DISCHARGE NOTE NURSING/CASE MANAGEMENT/SOCIAL WORK - FINANCIAL ASSISTANCE
[facility] provides services at a reduced cost to those who are determined to be eligible through [facility]’s financial assistance program. Information regarding [facility]’s financial assistance program can be found by going to [email or web address] or by calling [phone].

## 2024-10-24 NOTE — PROGRESS NOTE ADULT - PROBLEM SELECTOR PLAN 2
# RESOLVED    Hospital course at [facility] c/b AHRF s/p intubation on 9/18 for likely benzodiazepine overdose, extubated on 9/19. Diffuse rhonchi with copious secretions on exam. CTA chest (10/4) -> Large PE of R main pulmonary artery, scattered GGOs and interstitial changes predominantly in R and L lower lobes. Likely PE +/- superimposed aspiration PNA s/p empiric Zosyn for 7d (10/5-10/12).    - C/w management of PE, as above  - Now on RA

## 2024-10-24 NOTE — PROGRESS NOTE ADULT - PROBLEM SELECTOR PLAN 4
- Initially presented to [facility] on 9/14 for a mechanical fall after he tripped and fell onto his R shoulder  - XR showing R proximal humerus fracture, ORIF with Orthopedic Surgery at [facility] cancelled due to MICU course  > Orthopedic surgery on 10/7 said can follow-up outpt orthopedic surgery.

## 2024-10-24 NOTE — PROGRESS NOTE ADULT - PROBLEM SELECTOR PROBLEM 3
DVT (deep venous thrombosis)

## 2024-10-24 NOTE — PROGRESS NOTE ADULT - PROBLEM SELECTOR PLAN 3
- US of b/l LEs at [facility] -> R peroneal vein and L popliteal, tibial trunk, peroneal, and posterior tibial DVTs  > Repeat US of b/l LEs showing mulitple DVTs  > off heparin, now on Eliquis 5mg BID

## 2024-10-24 NOTE — PROGRESS NOTE ADULT - ASSESSMENT
Mr. Jarrett Overton is a 69-year-old w/ PMH of HTN, T2DM, CVA, A. flutter, CAD s/p CABG x3 (~2018), and alcohol use disorder who initially presented to [facility] on 9/14 for a mechanical fall. On 10/4, patient was found to be hypoxic with subsequent CTA chest showing large PE of R main pulmonary artery. US of b/l LEs was further significant for b/l DVTs in the R peroneal vein and L popliteal, tibial trunk, peroneal, and posterior tibial veins. Patient was transferred to [facility] for further management of PE and tx of superimposed PNA, now s/p PEG placement, dispo ROSALBA.

## 2024-10-24 NOTE — DISCHARGE NOTE NURSING/CASE MANAGEMENT/SOCIAL WORK - PATIENT PORTAL LINK FT
You can access the FollowMyHealth Patient Portal offered by [facility] by registering at the following website: [email or web address]. By joining [facility]’s FollowMyHealth portal, you will also be able to view your health information using other applications (apps) compatible with our system.

## 2024-10-24 NOTE — PROGRESS NOTE ADULT - PROVIDER SPECIALTY LIST ADULT
Gastroenterology
Internal Medicine
Internal Medicine
Neurology
Neurology
Vascular Cardiology
Vascular Cardiology
Gastroenterology
Internal Medicine
Neurology
Gastroenterology
Neurology
Wound Care
Internal Medicine

## 2024-10-24 NOTE — ADVANCED PRACTICE NURSE CONSULT - ASSESSMENT
Patient encountered on 4 Saurabh. When wound care RN arrived on unit, patient was found lying in a low air loss pressure redistribution support surface style bed. Patient was alert and oriented and gave consent to skin consult. On right elbow, allevyn in place, removed to find open wound with yellow adherent slough covering 100% of wound base, area measures approximately 1.5cm x 1.5cm x unknown with epithelialization around periphery of wound. Wound appearance consistent with an unstageable pressure injury. Once consult was complete, patient was educated regarding the need for routine turning and positioning to prevent pressure injuries.

## 2024-10-24 NOTE — ADVANCED PRACTICE NURSE CONSULT - RECOMMEDATIONS
Impression:    right elbow unstageable pressure injury     Recommendations:     1) turn and position q2 and PRN utilizing offloading assistive devices  2) routine pericare daily and PRN soiling  3) encourage optimal nutrition  4) waffle cushion when oob to chair  5) B/L LE complete cair air fluidized boots or jamal-lock pillow to offload heels/feet  6) triad protective barrier cream to B/L buttocks/sacrum daily and PRN soiling  7) incontinence management - consider external urinary catheter to divert urine from skin if incontinent  8) right elbow - cleanse skin and pat dry then apply cavilon to periwound, add medihoney to wound base and cover with allevyn foam dressing, change daily and/or PRN soiling    Plan discussed with DAGMAR Norman on unit    For questions/comments regarding the recommendations in this consult, please contact Melvina at [phone]. Wound care will not actively follow, please place future consults for new concerns. Thank you!

## 2024-10-24 NOTE — PROGRESS NOTE ADULT - NUTRITIONAL ASSESSMENT
This patient has been assessed with a concern for Malnutrition and has been determined to have a diagnosis/diagnoses of Moderate protein-calorie malnutrition.    This patient is being managed with:   Diet NPO with Tube Feed-  Tube Feeding Modality: Gastrostomy  Glucerna 1.2 Ronaldo (GLUCERNARTH)  Total Volume for 24 Hours (mL): 1560  Continuous  Starting Tube Feed Rate {mL per Hour}: 40  Increase Tube Feed Rate by (mL): 10     Every 4 hours  Until Goal Tube Feed Rate (mL per Hour): 65  Tube Feed Duration (in Hours): 24  Tube Feed Start Time: 17:00  Talat(7 Gm Arginine/7 Gm Glut/1.2 Gm HMB     Qty per Day:  2  Entered: Oct 18 2024  4:22PM  
This patient has been assessed with a concern for Malnutrition and has been determined to have a diagnosis/diagnoses of Moderate protein-calorie malnutrition.    This patient is being managed with:   Diet NPO with Tube Feed-  Tube Feeding Modality: Gastrostomy  Glucerna 1.2 Ronaldo (GLUCERNARTH)  Total Volume for 24 Hours (mL): 1560  Continuous  Starting Tube Feed Rate {mL per Hour}: 40  Increase Tube Feed Rate by (mL): 10     Every 4 hours  Until Goal Tube Feed Rate (mL per Hour): 65  Tube Feed Duration (in Hours): 24  Tube Feed Start Time: 17:00  Talat(7 Gm Arginine/7 Gm Glut/1.2 Gm HMB     Qty per Day:  2  Entered: Oct 18 2024  4:22PM  
This patient has been assessed with a concern for Malnutrition and has been determined to have a diagnosis/diagnoses of Moderate protein-calorie malnutrition.    This patient is being managed with:   Diet NPO after Midnight-     NPO Start Date: 15-Oct-2024   NPO Start Time: 23:59  Except Medications  Entered: Oct 15 2024  2:40PM    Diet NPO with Tube Feed-  Tube Feeding Modality: Nasogastric  Glucerna 1.2 Ronaldo (GLUCERNARTH)  Total Volume for 24 Hours (mL): 1440  Continuous  Starting Tube Feed Rate {mL per Hour}: 10  Increase Tube Feed Rate by (mL): 10     Every 12 hours  Until Goal Tube Feed Rate (mL per Hour): 60  Tube Feed Duration (in Hours): 24  Tube Feed Start Time: 13:00  Entered: Oct 15 2024 12:20AM  
This patient has been assessed with a concern for Malnutrition and has been determined to have a diagnosis/diagnoses of Moderate protein-calorie malnutrition.    This patient is being managed with:   Diet NPO with Tube Feed-  Tube Feeding Modality: Nasogastric  Glucerna 1.2 Ronaldo (GLUCERNARTH)  Total Volume for 24 Hours (mL): 1440  Continuous  Starting Tube Feed Rate {mL per Hour}: 10  Increase Tube Feed Rate by (mL): 10     Every 12 hours  Until Goal Tube Feed Rate (mL per Hour): 60  Tube Feed Duration (in Hours): 24  Tube Feed Start Time: 13:00  Entered: Oct  8 2024  2:51PM  
This patient has been assessed with a concern for Malnutrition and has been determined to have a diagnosis/diagnoses of Moderate protein-calorie malnutrition.    This patient is being managed with:   Diet NPO with Tube Feed-  Tube Feeding Modality: Gastrostomy  Glucerna 1.2 Ronaldo (GLUCERNARTH)  Total Volume for 24 Hours (mL): 1560  Continuous  Starting Tube Feed Rate {mL per Hour}: 40  Increase Tube Feed Rate by (mL): 10     Every 4 hours  Until Goal Tube Feed Rate (mL per Hour): 65  Tube Feed Duration (in Hours): 24  Tube Feed Start Time: 17:00  Talat(7 Gm Arginine/7 Gm Glut/1.2 Gm HMB     Qty per Day:  2  Entered: Oct 18 2024  4:22PM  
This patient has been assessed with a concern for Malnutrition and has been determined to have a diagnosis/diagnoses of Moderate protein-calorie malnutrition.    This patient is being managed with:   Diet NPO with Tube Feed-  Tube Feeding Modality: Gastrostomy  Glucerna 1.2 Ronaldo (GLUCERNARTH)  Total Volume for 24 Hours (mL): 1560  Continuous  Starting Tube Feed Rate {mL per Hour}: 40  Increase Tube Feed Rate by (mL): 10     Every 4 hours  Until Goal Tube Feed Rate (mL per Hour): 65  Tube Feed Duration (in Hours): 24  Tube Feed Start Time: 17:00  Talat(7 Gm Arginine/7 Gm Glut/1.2 Gm HMB     Qty per Day:  2  Entered: Oct 18 2024  4:22PM  
This patient has been assessed with a concern for Malnutrition and has been determined to have a diagnosis/diagnoses of Moderate protein-calorie malnutrition.    This patient is being managed with:   Diet NPO with Tube Feed-  Tube Feeding Modality: Nasogastric  Glucerna 1.2 Ronaldo (GLUCERNARTH)  Total Volume for 24 Hours (mL): 1440  Continuous  Starting Tube Feed Rate {mL per Hour}: 10  Increase Tube Feed Rate by (mL): 10     Every 12 hours  Until Goal Tube Feed Rate (mL per Hour): 60  Tube Feed Duration (in Hours): 24  Tube Feed Start Time: 13:00  Entered: Oct 15 2024 12:20AM  
This patient has been assessed with a concern for Malnutrition and has been determined to have a diagnosis/diagnoses of Moderate protein-calorie malnutrition.    This patient is being managed with:   Diet NPO with Tube Feed-  Tube Feeding Modality: Gastrostomy  Glucerna 1.2 Ronaldo (GLUCERNARTH)  Total Volume for 24 Hours (mL): 1560  Continuous  Starting Tube Feed Rate {mL per Hour}: 40  Increase Tube Feed Rate by (mL): 10     Every 4 hours  Until Goal Tube Feed Rate (mL per Hour): 65  Tube Feed Duration (in Hours): 24  Tube Feed Start Time: 17:00  Talat(7 Gm Arginine/7 Gm Glut/1.2 Gm HMB     Qty per Day:  2  Entered: Oct 18 2024  4:22PM  
This patient has been assessed with a concern for Malnutrition and has been determined to have a diagnosis/diagnoses of Moderate protein-calorie malnutrition.    This patient is being managed with:   Diet NPO with Tube Feed-  Tube Feeding Modality: Nasogastric  Glucerna 1.2 Ronaldo (GLUCERNARTH)  Total Volume for 24 Hours (mL): 1440  Continuous  Starting Tube Feed Rate {mL per Hour}: 10  Increase Tube Feed Rate by (mL): 10     Every 12 hours  Until Goal Tube Feed Rate (mL per Hour): 60  Tube Feed Duration (in Hours): 24  Tube Feed Start Time: 13:00  Entered: Oct  8 2024  2:51PM  
This patient has been assessed with a concern for Malnutrition and has been determined to have a diagnosis/diagnoses of Moderate protein-calorie malnutrition.    This patient is being managed with:   Diet NPO with Tube Feed-  Tube Feeding Modality: Gastrostomy  Glucerna 1.2 Ronaldo (GLUCERNARTH)  Total Volume for 24 Hours (mL): 1440  Continuous  Starting Tube Feed Rate {mL per Hour}: 10  Increase Tube Feed Rate by (mL): 10     Every 4 hours  Until Goal Tube Feed Rate (mL per Hour): 60  Tube Feed Duration (in Hours): 24  Tube Feed Start Time: 17:00  Entered: Oct 17 2024  5:04PM  
This patient has been assessed with a concern for Malnutrition and has been determined to have a diagnosis/diagnoses of Moderate protein-calorie malnutrition.    This patient is being managed with:   Diet NPO with Tube Feed-  Tube Feeding Modality: Nasogastric  Glucerna 1.2 Ronaldo (GLUCERNARTH)  Total Volume for 24 Hours (mL): 1440  Continuous  Starting Tube Feed Rate {mL per Hour}: 10  Increase Tube Feed Rate by (mL): 10     Every 12 hours  Until Goal Tube Feed Rate (mL per Hour): 60  Tube Feed Duration (in Hours): 24  Tube Feed Start Time: 13:00  Entered: Oct  8 2024  2:51PM  
This patient has been assessed with a concern for Malnutrition and has been determined to have a diagnosis/diagnoses of Moderate protein-calorie malnutrition.    This patient is being managed with:   Diet NPO with Tube Feed-  Tube Feeding Modality: Nasogastric  Glucerna 1.2 Ronaldo (GLUCERNARTH)  Total Volume for 24 Hours (mL): 1440  Continuous  Starting Tube Feed Rate {mL per Hour}: 10  Increase Tube Feed Rate by (mL): 10     Every 12 hours  Until Goal Tube Feed Rate (mL per Hour): 60  Tube Feed Duration (in Hours): 24  Tube Feed Start Time: 13:00  Entered: Oct  8 2024  2:51PM  
This patient has been assessed with a concern for Malnutrition and has been determined to have a diagnosis/diagnoses of Moderate protein-calorie malnutrition.    This patient is being managed with:   Diet NPO with Tube Feed-  Tube Feeding Modality: Gastrostomy  Glucerna 1.5 Ronaldo (GLUCERNA1.5RTH)  Continuous  Starting Tube Feed Rate {mL per Hour}: 40  Increase Tube Feed Rate by (mL): 10     Every 4 hours  Until Goal Tube Feed Rate (mL per Hour): 60  Tube Feed Duration (in Hours): 24  Tube Feed Start Time: 17:00  Talat(7 Gm Arginine/7 Gm Glut/1.2 Gm HMB     Qty per Day:  2  Entered: Oct 23 2024  5:02PM  
This patient has been assessed with a concern for Malnutrition and has been determined to have a diagnosis/diagnoses of Moderate protein-calorie malnutrition.    This patient is being managed with:   Diet NPO after Midnight-     NPO Start Date: 15-Oct-2024   NPO Start Time: 23:59  Except Medications  Entered: Oct 15 2024  2:40PM    Diet NPO with Tube Feed-  Tube Feeding Modality: Nasogastric  Glucerna 1.2 Ronaldo (GLUCERNARTH)  Total Volume for 24 Hours (mL): 1440  Continuous  Starting Tube Feed Rate {mL per Hour}: 10  Increase Tube Feed Rate by (mL): 10     Every 12 hours  Until Goal Tube Feed Rate (mL per Hour): 60  Tube Feed Duration (in Hours): 24  Tube Feed Start Time: 13:00  Entered: Oct 15 2024 12:20AM

## 2024-10-24 NOTE — PROGRESS NOTE ADULT - REASON FOR ADMISSION
Pulmonary Embolism

## 2024-10-24 NOTE — PROGRESS NOTE ADULT - PROBLEM SELECTOR PROBLEM 2
Acute respiratory failure with hypoxia

## 2024-10-24 NOTE — PROGRESS NOTE ADULT - ATTENDING SUPERVISION STATEMENT
Fellow
Resident/Student
Resident
Resident/Student
Resident
Resident/Student
Resident

## 2024-10-24 NOTE — DISCHARGE NOTE NURSING/CASE MANAGEMENT/SOCIAL WORK - NSDCFUADDAPPT_GEN_ALL_CORE_FT
APPTS ARE READY TO BE MADE: [ ] YES    Best Family or Patient Contact (if needed):    Additional Information about above appointments (if needed):    1: PCP  2: GI  3: Neurologist   4. Orthopedic surgery     Other comments or requests:

## 2024-10-24 NOTE — PROGRESS NOTE ADULT - PROBLEM SELECTOR PROBLEM 1
Pulmonary embolism

## 2024-10-24 NOTE — PROGRESS NOTE ADULT - PROBLEM SELECTOR PROBLEM 4
Humeral fracture

## 2024-10-24 NOTE — PROGRESS NOTE ADULT - PROBLEM SELECTOR PLAN 1
- Hospital course at [facility] c/b AHRF  - CTA chest (10/4) -> Large PE of R main pulmonary artery  - US of b/l LEs -> R peroneal vein and L popliteal, tibial trunk, peroneal, and posterior tibial DVTs  - EKG showing evidence of right heart strain, S1Q3T3 changes, T wave depressions in precordial leads; not present on EKG from 9/15  - TTE (9/15) unremarkable  - Troponin 92, BNP 7168 on transfer  - s/p Eliquis loading now on Eliquis 5mg BID 10/17

## 2024-10-24 NOTE — ADVANCED PRACTICE NURSE CONSULT - REASON FOR CONSULT
A Consult was requested to assess skin condition of Right Elbow wound and Right shin wound. Patient admitted 10/4/24.  Reason for Admission: Pulmonary Embolism  History of Present Illness:   Mr. Jarrett Overton is a 69-year-old w/ PMH of HTN, T2DM, CVA, A. flutter, CAD s/p CABG x3 (~2018), and alcohol use disorder who initially presented to [facility] on 9/14 for a mechanical fall after he tripped and fell onto his R shoulder, denied any headstrike or LOC, subsequent XR showing R proximal humerus fracture with planned ORIF with Orthopedic Surgery. Course was c/b alcohol withdrawal and was started on CIWA protocol. On 9/18, RRT was called for AMS and patient was found to be in acute hypercapnic respiratory failure iso prior Librium dose. Patient was given flumazenil and then intubated and transferred to the MICU. ORIF of R humerus was cancelled and CT head showed signs of subacute and chronic subdural hematomas. Patient was extubated on 9/19 and repeat CT head showed stable hematomas. Following extubation, patient suffered from significant dysphagia but patient declined PEG tube offered by GI and instead NG tube was placed. On 10/4, patient was found to be hypoxic with subsequent CTA chest showing large PE of R main pulmonary artery. US of b/l LEs was further significant for b/l DVTs in the R peroneal vein and L popliteal, tibial trunk, peroneal, and posterior tibial veins. Patient was transferred to [facility] for further management of PE and embolectomy evaluation.    
Follow- up consult to assess right elbow     Reason for Admission: Pulmonary Embolism  History of Present Illness:   Mr. Jarrett Overton is a 69-year-old w/ PMH of HTN, T2DM, CVA, A. flutter, CAD s/p CABG x3 (~2018), and alcohol use disorder who initially presented to [facility] on 9/14 for a mechanical fall after he tripped and fell onto his R shoulder, denied any headstrike or LOC, subsequent XR showing R proximal humerus fracture with planned ORIF with Orthopedic Surgery. Course was c/b alcohol withdrawal and was started on CIWA protocol. On 9/18, RRT was called for AMS and patient was found to be in acute hypercapnic respiratory failure iso prior Librium dose. Patient was given flumazenil and then intubated and transferred to the MICU. ORIF of R humerus was cancelled and CT head showed signs of subacute and chronic subdural hematomas. Patient was extubated on 9/19 and repeat CT head showed stable hematomas. Following extubation, patient suffered from significant dysphagia but patient declined PEG tube offered by GI and instead NG tube was placed. On 10/4, patient was found to be hypoxic with subsequent CTA chest showing large PE of R main pulmonary artery. US of b/l LEs was further significant for b/l DVTs in the R peroneal vein and L popliteal, tibial trunk, peroneal, and posterior tibial veins. Patient was transferred to [facility] for further management of PE and embolectomy evaluation.

## 2024-10-24 NOTE — PROGRESS NOTE ADULT - SUBJECTIVE AND OBJECTIVE BOX
INTERVAL HPI/OVERNIGHT EVENTS:  Pt seen and examined at bedside. No acute overnight events or complaints.    Brief Daily Plan:  - Pending ROSALBA    VITAL SIGNS:  T(F): 98.1 (10-24-24 @ 04:20)  HR: 75 (10-24-24 @ 04:20)  BP: 113/70 (10-24-24 @ 04:20)  RR: 18 (10-24-24 @ 04:20)  SpO2: 96% (10-24-24 @ 04:20)  Wt(kg): --    PHYSICAL EXAM:    CONSTITUTIONAL: NAD, comfortable, AAOx3  HEENT: MMM  RESPIRATORY: Normal respiratory effort; lungs are clear to auscultation bilaterally  CARDIOVASCULAR: Regular rate and rhythm, normal S1 and S2, +systolic murmur  ABDOMEN: Nontender to palpation, normoactive bowel sounds, no rebound/guarding, PEG tube in place no erythema pus or tenderness but only trace dried blood seen  MUSCULOSKELETAL:  Moving limbs spontaneously; RUE in sling; No lower extremity edema  PSYCH: Affect appropriate, pleasant    MEDICATIONS  (STANDING):  aMIOdarone    Tablet 200 milliGRAM(s) Oral daily  amLODIPine   Tablet 10 milliGRAM(s) Oral daily  apixaban 5 milliGRAM(s) Enteral Tube every 12 hours  atorvastatin 40 milliGRAM(s) Oral at bedtime  dextrose 5%. 1000 milliLiter(s) (50 mL/Hr) IV Continuous <Continuous>  dextrose 5%. 1000 milliLiter(s) (100 mL/Hr) IV Continuous <Continuous>  dextrose 50% Injectable 12.5 Gram(s) IV Push once  dextrose 50% Injectable 25 Gram(s) IV Push once  dextrose 50% Injectable 25 Gram(s) IV Push once  dextrose Oral Gel 15 Gram(s) Oral once  fluticasone propionate/ salmeterol 250-50 MICROgram(s) Diskus 1 Dose(s) Inhalation two times a day  glucagon  Injectable 1 milliGRAM(s) IntraMuscular once  insulin lispro (ADMELOG) corrective regimen sliding scale   SubCutaneous every 6 hours  metoprolol tartrate 25 milliGRAM(s) Oral two times a day  pantoprazole   Suspension 40 milliGRAM(s) Oral daily  polyethylene glycol 3350 17 Gram(s) Oral daily  thiamine 100 milliGRAM(s) Oral daily    MEDICATIONS  (PRN):  acetaminophen     Tablet .. 650 milliGRAM(s) Oral every 6 hours PRN Mild Pain (1 - 3), Moderate Pain (4 - 6)  albuterol/ipratropium for Nebulization 3 milliLiter(s) Nebulizer every 6 hours PRN Shortness of Breath and/or Wheezing      Allergies    No Known Allergies    Intolerances        LABS:                        10.9   6.49  )-----------( 223      ( 23 Oct 2024 06:49 )             33.7     10-23    138  |  97  |  15  ----------------------------<  129[H]  3.9   |  26  |  0.67    Ca    10.1      23 Oct 2024 06:49  Phos  4.4     10-23  Mg     2.1     10-23        Urinalysis Basic - ( 23 Oct 2024 06:49 )    Color: x / Appearance: x / SG: x / pH: x  Gluc: 129 mg/dL / Ketone: x  / Bili: x / Urobili: x   Blood: x / Protein: x / Nitrite: x   Leuk Esterase: x / RBC: x / WBC x   Sq Epi: x / Non Sq Epi: x / Bacteria: x        RADIOLOGY & ADDITIONAL TESTS:  Reviewed  ******************  Authored By: Henok Nava MD, PGY1  MS Teams Preferred  ******************   INTERVAL HPI/OVERNIGHT EVENTS:  Pt seen and examined at bedside. No acute overnight events or complaints.    Brief Daily Plan:  - Accepted to [facility] with a bed.     VITAL SIGNS:  T(F): 98.1 (10-24-24 @ 04:20)  HR: 75 (10-24-24 @ 04:20)  BP: 113/70 (10-24-24 @ 04:20)  RR: 18 (10-24-24 @ 04:20)  SpO2: 96% (10-24-24 @ 04:20)  Wt(kg): --    PHYSICAL EXAM:    CONSTITUTIONAL: NAD, comfortable, AAOx3  HEENT: MMM  RESPIRATORY: Normal respiratory effort; lungs are clear to auscultation bilaterally  CARDIOVASCULAR: Regular rate and rhythm, normal S1 and S2, +systolic murmur  ABDOMEN: Nontender to palpation, normoactive bowel sounds, no rebound/guarding, PEG tube in place no erythema pus or tenderness but only trace dried blood seen  MUSCULOSKELETAL:  Moving limbs spontaneously; RUE in sling; No lower extremity edema  PSYCH: Affect appropriate, pleasant    MEDICATIONS  (STANDING):  aMIOdarone    Tablet 200 milliGRAM(s) Oral daily  amLODIPine   Tablet 10 milliGRAM(s) Oral daily  apixaban 5 milliGRAM(s) Enteral Tube every 12 hours  atorvastatin 40 milliGRAM(s) Oral at bedtime  dextrose 5%. 1000 milliLiter(s) (50 mL/Hr) IV Continuous <Continuous>  dextrose 5%. 1000 milliLiter(s) (100 mL/Hr) IV Continuous <Continuous>  dextrose 50% Injectable 12.5 Gram(s) IV Push once  dextrose 50% Injectable 25 Gram(s) IV Push once  dextrose 50% Injectable 25 Gram(s) IV Push once  dextrose Oral Gel 15 Gram(s) Oral once  fluticasone propionate/ salmeterol 250-50 MICROgram(s) Diskus 1 Dose(s) Inhalation two times a day  glucagon  Injectable 1 milliGRAM(s) IntraMuscular once  insulin lispro (ADMELOG) corrective regimen sliding scale   SubCutaneous every 6 hours  metoprolol tartrate 25 milliGRAM(s) Oral two times a day  pantoprazole   Suspension 40 milliGRAM(s) Oral daily  polyethylene glycol 3350 17 Gram(s) Oral daily  thiamine 100 milliGRAM(s) Oral daily    MEDICATIONS  (PRN):  acetaminophen     Tablet .. 650 milliGRAM(s) Oral every 6 hours PRN Mild Pain (1 - 3), Moderate Pain (4 - 6)  albuterol/ipratropium for Nebulization 3 milliLiter(s) Nebulizer every 6 hours PRN Shortness of Breath and/or Wheezing      Allergies    No Known Allergies    Intolerances        LABS:                        10.9   6.49  )-----------( 223      ( 23 Oct 2024 06:49 )             33.7     10-23    138  |  97  |  15  ----------------------------<  129[H]  3.9   |  26  |  0.67    Ca    10.1      23 Oct 2024 06:49  Phos  4.4     10-23  Mg     2.1     10-23        Urinalysis Basic - ( 23 Oct 2024 06:49 )    Color: x / Appearance: x / SG: x / pH: x  Gluc: 129 mg/dL / Ketone: x  / Bili: x / Urobili: x   Blood: x / Protein: x / Nitrite: x   Leuk Esterase: x / RBC: x / WBC x   Sq Epi: x / Non Sq Epi: x / Bacteria: x        RADIOLOGY & ADDITIONAL TESTS:  Reviewed  ******************  Authored By: Henok Nava MD, PGY1  MS Teams Preferred  ******************

## 2024-10-31 LAB
LRP4 AUTOANTIBODY TEST: SIGNIFICANT CHANGE UP
MUSK IGG SER IA-MCNC: SIGNIFICANT CHANGE UP

## 2024-11-14 PROBLEM — I10 ESSENTIAL (PRIMARY) HYPERTENSION: Chronic | Status: ACTIVE | Noted: 2024-10-05

## 2024-11-14 PROBLEM — Z87.898 PERSONAL HISTORY OF OTHER SPECIFIED CONDITIONS: Chronic | Status: ACTIVE | Noted: 2024-10-05

## 2024-11-14 PROBLEM — I63.9 CEREBRAL INFARCTION, UNSPECIFIED: Chronic | Status: ACTIVE | Noted: 2024-10-05

## 2024-11-14 PROBLEM — E11.9 TYPE 2 DIABETES MELLITUS WITHOUT COMPLICATIONS: Chronic | Status: ACTIVE | Noted: 2024-10-05

## 2024-11-14 PROBLEM — I25.10 ATHEROSCLEROTIC HEART DISEASE OF NATIVE CORONARY ARTERY WITHOUT ANGINA PECTORIS: Chronic | Status: ACTIVE | Noted: 2024-10-05

## 2024-11-14 PROBLEM — I48.92 UNSPECIFIED ATRIAL FLUTTER: Chronic | Status: ACTIVE | Noted: 2024-10-05

## 2024-11-18 ENCOUNTER — APPOINTMENT (OUTPATIENT)
Dept: ORTHOPEDIC SURGERY | Facility: CLINIC | Age: 69
End: 2024-11-18
Payer: MEDICARE

## 2024-11-18 DIAGNOSIS — S42.291A OTHER DISPLACED FRACTURE OF UPPER END OF RIGHT HUMERUS, INITIAL ENCOUNTER FOR CLOSED FRACTURE: ICD-10-CM

## 2024-11-18 PROCEDURE — 73030 X-RAY EXAM OF SHOULDER: CPT | Mod: RT

## 2024-11-18 PROCEDURE — 99204 OFFICE O/P NEW MOD 45 MIN: CPT

## 2024-11-26 ENCOUNTER — APPOINTMENT (OUTPATIENT)
Dept: NEUROLOGY | Facility: HOSPITAL | Age: 69
End: 2024-11-26
Payer: MEDICARE

## 2024-11-26 ENCOUNTER — OUTPATIENT (OUTPATIENT)
Dept: OUTPATIENT SERVICES | Facility: HOSPITAL | Age: 69
LOS: 1 days | End: 2024-11-26
Payer: MEDICARE

## 2024-11-26 VITALS
HEIGHT: 69 IN | RESPIRATION RATE: 14 BRPM | WEIGHT: 160 LBS | OXYGEN SATURATION: 98 % | SYSTOLIC BLOOD PRESSURE: 101 MMHG | TEMPERATURE: 97.3 F | DIASTOLIC BLOOD PRESSURE: 69 MMHG | HEART RATE: 77 BPM | BODY MASS INDEX: 23.7 KG/M2

## 2024-11-26 DIAGNOSIS — R51.9 HEADACHE, UNSPECIFIED: ICD-10-CM

## 2024-11-26 DIAGNOSIS — I63.9 CEREBRAL INFARCTION, UNSPECIFIED: ICD-10-CM

## 2024-11-26 DIAGNOSIS — Z98.890 OTHER SPECIFIED POSTPROCEDURAL STATES: Chronic | ICD-10-CM

## 2024-11-26 PROCEDURE — 86901 BLOOD TYPING SEROLOGIC RH(D): CPT

## 2024-11-26 PROCEDURE — 84484 ASSAY OF TROPONIN QUANT: CPT

## 2024-11-26 PROCEDURE — 86041 ACETYLCHOLN RCPTR BNDNG ANTB: CPT

## 2024-11-26 PROCEDURE — 85520 HEPARIN ASSAY: CPT

## 2024-11-26 PROCEDURE — 83036 HEMOGLOBIN GLYCOSYLATED A1C: CPT

## 2024-11-26 PROCEDURE — 93005 ELECTROCARDIOGRAM TRACING: CPT

## 2024-11-26 PROCEDURE — 74176 CT ABD & PELVIS W/O CONTRAST: CPT | Mod: MC

## 2024-11-26 PROCEDURE — 80053 COMPREHEN METABOLIC PANEL: CPT

## 2024-11-26 PROCEDURE — G0463: CPT

## 2024-11-26 PROCEDURE — 97530 THERAPEUTIC ACTIVITIES: CPT

## 2024-11-26 PROCEDURE — 85025 COMPLETE CBC W/AUTO DIFF WBC: CPT

## 2024-11-26 PROCEDURE — 94640 AIRWAY INHALATION TREATMENT: CPT

## 2024-11-26 PROCEDURE — 85018 HEMOGLOBIN: CPT

## 2024-11-26 PROCEDURE — 99202 OFFICE O/P NEW SF 15 MIN: CPT

## 2024-11-26 PROCEDURE — 97129 THER IVNTJ 1ST 15 MIN: CPT

## 2024-11-26 PROCEDURE — 97130 THER IVNTJ EA ADDL 15 MIN: CPT

## 2024-11-26 PROCEDURE — 99213 OFFICE O/P EST LOW 20 MIN: CPT | Mod: 25

## 2024-11-26 PROCEDURE — L8699: CPT

## 2024-11-26 PROCEDURE — 97116 GAIT TRAINING THERAPY: CPT

## 2024-11-26 PROCEDURE — 84295 ASSAY OF SERUM SODIUM: CPT

## 2024-11-26 PROCEDURE — 82947 ASSAY GLUCOSE BLOOD QUANT: CPT

## 2024-11-26 PROCEDURE — 86043 ACETYLCHOLN RCPTR MODLG ANTB: CPT

## 2024-11-26 PROCEDURE — 82435 ASSAY OF BLOOD CHLORIDE: CPT

## 2024-11-26 PROCEDURE — 86255 FLUORESCENT ANTIBODY SCREEN: CPT

## 2024-11-26 PROCEDURE — 70553 MRI BRAIN STEM W/O & W/DYE: CPT | Mod: MC

## 2024-11-26 PROCEDURE — 80048 BASIC METABOLIC PNL TOTAL CA: CPT

## 2024-11-26 PROCEDURE — 70450 CT HEAD/BRAIN W/O DYE: CPT | Mod: MC

## 2024-11-26 PROCEDURE — 82330 ASSAY OF CALCIUM: CPT

## 2024-11-26 PROCEDURE — 85730 THROMBOPLASTIN TIME PARTIAL: CPT

## 2024-11-26 PROCEDURE — 86850 RBC ANTIBODY SCREEN: CPT

## 2024-11-26 PROCEDURE — 86042 ACETYLCHOLN RCPTR BLCKG ANTB: CPT

## 2024-11-26 PROCEDURE — 97163 PT EVAL HIGH COMPLEX 45 MIN: CPT

## 2024-11-26 PROCEDURE — 92612 ENDOSCOPY SWALLOW (FEES) VID: CPT

## 2024-11-26 PROCEDURE — 85610 PROTHROMBIN TIME: CPT

## 2024-11-26 PROCEDURE — 80061 LIPID PANEL: CPT

## 2024-11-26 PROCEDURE — 93306 TTE W/DOPPLER COMPLETE: CPT

## 2024-11-26 PROCEDURE — 93970 EXTREMITY STUDY: CPT

## 2024-11-26 PROCEDURE — 82803 BLOOD GASES ANY COMBINATION: CPT

## 2024-11-26 PROCEDURE — 36415 COLL VENOUS BLD VENIPUNCTURE: CPT

## 2024-11-26 PROCEDURE — 92610 EVALUATE SWALLOWING FUNCTION: CPT

## 2024-11-26 PROCEDURE — A9585: CPT

## 2024-11-26 PROCEDURE — 85014 HEMATOCRIT: CPT

## 2024-11-26 PROCEDURE — 82962 GLUCOSE BLOOD TEST: CPT

## 2024-11-26 PROCEDURE — 93356 MYOCRD STRAIN IMG SPCKL TRCK: CPT

## 2024-11-26 PROCEDURE — 86366 MUSCLE-SPECIFIC KINASE ANTB: CPT

## 2024-11-26 PROCEDURE — 97110 THERAPEUTIC EXERCISES: CPT

## 2024-11-26 PROCEDURE — 85027 COMPLETE CBC AUTOMATED: CPT

## 2024-11-26 PROCEDURE — 71045 X-RAY EXAM CHEST 1 VIEW: CPT

## 2024-11-26 PROCEDURE — 84100 ASSAY OF PHOSPHORUS: CPT

## 2024-11-26 PROCEDURE — 83605 ASSAY OF LACTIC ACID: CPT

## 2024-11-26 PROCEDURE — 97167 OT EVAL HIGH COMPLEX 60 MIN: CPT

## 2024-11-26 PROCEDURE — 83880 ASSAY OF NATRIURETIC PEPTIDE: CPT

## 2024-11-26 PROCEDURE — 86900 BLOOD TYPING SEROLOGIC ABO: CPT

## 2024-11-26 PROCEDURE — 83735 ASSAY OF MAGNESIUM: CPT

## 2024-11-26 PROCEDURE — 84132 ASSAY OF SERUM POTASSIUM: CPT

## 2024-12-10 ENCOUNTER — APPOINTMENT (OUTPATIENT)
Dept: ORTHOPEDIC SURGERY | Facility: CLINIC | Age: 69
End: 2024-12-10
Payer: MEDICARE

## 2024-12-10 VITALS — HEIGHT: 69 IN | WEIGHT: 160 LBS | BODY MASS INDEX: 23.7 KG/M2

## 2024-12-10 DIAGNOSIS — S42.221P: ICD-10-CM

## 2024-12-10 PROCEDURE — 99204 OFFICE O/P NEW MOD 45 MIN: CPT

## 2024-12-18 PROBLEM — S42.221P: Status: ACTIVE | Noted: 2024-12-18

## 2025-02-10 ENCOUNTER — APPOINTMENT (OUTPATIENT)
Dept: GASTROENTEROLOGY | Facility: CLINIC | Age: 70
End: 2025-02-10
Payer: MEDICARE

## 2025-02-10 VITALS
HEIGHT: 69 IN | RESPIRATION RATE: 14 BRPM | HEART RATE: 94 BPM | SYSTOLIC BLOOD PRESSURE: 115 MMHG | DIASTOLIC BLOOD PRESSURE: 68 MMHG | OXYGEN SATURATION: 99 % | BODY MASS INDEX: 23.7 KG/M2 | WEIGHT: 160 LBS

## 2025-02-10 DIAGNOSIS — E11.9 TYPE 2 DIABETES MELLITUS W/OUT COMPLICATIONS: ICD-10-CM

## 2025-02-10 DIAGNOSIS — Z78.9 OTHER SPECIFIED HEALTH STATUS: ICD-10-CM

## 2025-02-10 DIAGNOSIS — R13.12 DYSPHAGIA, OROPHARYNGEAL PHASE: ICD-10-CM

## 2025-02-10 DIAGNOSIS — L92.9 GRANULOMATOUS DISORDER OF THE SKIN AND SUBCUTANEOUS TISSUE, UNSPECIFIED: ICD-10-CM

## 2025-02-10 DIAGNOSIS — Z87.898 PERSONAL HISTORY OF OTHER SPECIFIED CONDITIONS: ICD-10-CM

## 2025-02-10 DIAGNOSIS — K31.89 OTHER DISEASES OF STOMACH AND DUODENUM: ICD-10-CM

## 2025-02-10 DIAGNOSIS — I10 ESSENTIAL (PRIMARY) HYPERTENSION: ICD-10-CM

## 2025-02-10 PROCEDURE — 99214 OFFICE O/P EST MOD 30 MIN: CPT

## 2025-02-11 ENCOUNTER — APPOINTMENT (OUTPATIENT)
Dept: ORTHOPEDIC SURGERY | Facility: CLINIC | Age: 70
End: 2025-02-11

## 2025-03-17 ENCOUNTER — APPOINTMENT (OUTPATIENT)
Age: 70
End: 2025-03-17

## 2025-03-17 VITALS
HEART RATE: 104 BPM | BODY MASS INDEX: 23.7 KG/M2 | SYSTOLIC BLOOD PRESSURE: 86 MMHG | OXYGEN SATURATION: 100 % | DIASTOLIC BLOOD PRESSURE: 64 MMHG | WEIGHT: 160 LBS | HEIGHT: 69 IN

## 2025-03-17 PROCEDURE — 99213 OFFICE O/P EST LOW 20 MIN: CPT

## 2025-04-23 NOTE — DISCHARGE NOTE PROVIDER - [id number]_GEN_ALL_CORE
[FreeTextEntry1] : Documented by Phil Laura acting as a scribe for Dr. Wells and Juan Rodriges PA-C on 04/22/2025 and was presence for the following sections: Physical Exam; Data Reviewed; Assessment; Discussion/Summary. All medical record entries made by the Scribe were at my, Dr. Wells, and Juan Rodriges, direction and personally dictated by me on 04/22/2025. I have reviewed the chart and agree that the record accurately reflects my personal performance of the history, physical exam, procedure and imaging. Yes

## (undated) DEVICE — SOL INJ NS 0.9% 500ML 2 PORT

## (undated) DEVICE — PACK IV START WITH CHG

## (undated) DEVICE — TUBING IV SET GRAVITY 3Y 100" MACRO

## (undated) DEVICE — TUBING SUCTION 20FT

## (undated) DEVICE — SUCTION YANKAUER NO CONTROL VENT

## (undated) DEVICE — CATH IV SAFE BC 22G X 1" (BLUE)

## (undated) DEVICE — ABDOMINAL BINDER MED/LG 9" X 36"-64"

## (undated) DEVICE — BALLOON US ENDO

## (undated) DEVICE — CATH IV SAFE BC 20G X 1.16" (PINK)

## (undated) DEVICE — TUBING SUCTION CONN 6FT STERILE

## (undated) DEVICE — SENSOR O2 FINGER ADULT

## (undated) DEVICE — SYR ALLIANCE II INFLATION 60ML

## (undated) DEVICE — BITE BLOCK ADULT 20 X 27MM (GREEN)

## (undated) DEVICE — FOLEY HOLDER STATLOCK 2 WAY ADULT